# Patient Record
Sex: FEMALE | Race: BLACK OR AFRICAN AMERICAN | Employment: OTHER | URBAN - METROPOLITAN AREA
[De-identification: names, ages, dates, MRNs, and addresses within clinical notes are randomized per-mention and may not be internally consistent; named-entity substitution may affect disease eponyms.]

---

## 2017-04-03 ENCOUNTER — HOSPITAL ENCOUNTER (EMERGENCY)
Age: 52
Discharge: HOME | End: 2017-04-03
Attending: EMERGENCY MEDICINE
Payer: MEDICARE

## 2017-04-03 ENCOUNTER — APPOINTMENT (OUTPATIENT)
Dept: GENERAL RADIOLOGY | Age: 52
End: 2017-04-03
Attending: EMERGENCY MEDICINE
Payer: MEDICARE

## 2017-04-03 VITALS
HEIGHT: 65 IN | SYSTOLIC BLOOD PRESSURE: 144 MMHG | DIASTOLIC BLOOD PRESSURE: 82 MMHG | OXYGEN SATURATION: 98 % | BODY MASS INDEX: 33.82 KG/M2 | WEIGHT: 203 LBS | RESPIRATION RATE: 16 BRPM | TEMPERATURE: 99 F | HEART RATE: 78 BPM

## 2017-04-03 DIAGNOSIS — R07.9 CHEST PAIN, UNSPECIFIED TYPE: Primary | ICD-10-CM

## 2017-04-03 DIAGNOSIS — R03.0 ELEVATED BLOOD PRESSURE READING: ICD-10-CM

## 2017-04-03 PROCEDURE — 96372 THER/PROPH/DIAG INJ SC/IM: CPT

## 2017-04-03 PROCEDURE — 71020 XR CHEST PA LAT: CPT

## 2017-04-03 PROCEDURE — 93005 ELECTROCARDIOGRAM TRACING: CPT

## 2017-04-03 PROCEDURE — 74011250636 HC RX REV CODE- 250/636: Performed by: EMERGENCY MEDICINE

## 2017-04-03 PROCEDURE — 99284 EMERGENCY DEPT VISIT MOD MDM: CPT

## 2017-04-03 RX ORDER — NAPROXEN 500 MG/1
500 TABLET ORAL 2 TIMES DAILY WITH MEALS
Qty: 20 TAB | Refills: 0 | Status: SHIPPED | OUTPATIENT
Start: 2017-04-03 | End: 2017-04-13

## 2017-04-03 RX ORDER — OXYCODONE AND ACETAMINOPHEN 5; 325 MG/1; MG/1
1 TABLET ORAL
Qty: 12 TAB | Refills: 0 | Status: SHIPPED | OUTPATIENT
Start: 2017-04-03 | End: 2018-03-12

## 2017-04-03 RX ORDER — GABAPENTIN 300 MG/1
300 CAPSULE ORAL 2 TIMES DAILY
COMMUNITY
End: 2018-04-03

## 2017-04-03 RX ORDER — KETOROLAC TROMETHAMINE 30 MG/ML
30 INJECTION, SOLUTION INTRAMUSCULAR; INTRAVENOUS
Status: COMPLETED | OUTPATIENT
Start: 2017-04-03 | End: 2017-04-03

## 2017-04-03 RX ADMIN — KETOROLAC TROMETHAMINE 30 MG: 30 INJECTION, SOLUTION INTRAMUSCULAR at 20:23

## 2017-04-03 NOTE — ED TRIAGE NOTES
Twisted and reaching from front seat to back seat yesterday, pain started after twisting, pain is worse with movement and deep breathing, no other injuries reported, did not take any OTC medication,

## 2017-04-03 NOTE — ED NOTES
Amb into ed - reports she reached around from passenger front seat to back seat yesterday in car and ? Pulled a muscle in her r ribcage area - since that time has had pain in r anterior ribcage - worse when she lays down and w/ deep breathing.

## 2017-04-03 NOTE — ED NOTES
Sepsis Screening completed    (  )Patient meets SIRS criteria. (x  )Patient does not meet SIRS criteria.       SIRS Criteria is achieved when two or more of the following are present   Temperature < 96.8°F (36°C) or > 100.9°F (38.3°C)   Heart Rate > 90 beats per minute   Respiratory Rate > 20 breaths per minute   WBC count > 12,000 or <4,000 or > 10% bands

## 2017-04-04 NOTE — ED PROVIDER NOTES
[address]  EMERGENCY DEPARTMENT HISTORY AND PHYSICAL EXAM       Date: 4/3/2017   Patient Name: Felisa Estrada   YOB: 1965  Medical Record Number: [id number]    History of Presenting Illness     Chief Complaint   Patient presents with    Rib Pain        History Provided By:  patient    Additional History:   8:05 PM   Felisa Estrada is a 46 y.o. female who presents to the emergency department c/o right rib pan s/p turning in a car yesterday and \"felt a catch. \" Pain is exacerbated by movements and breathing. Associated symptoms include SOB, chills, and headache. She states she felt a \"catch. \" with twisting to back seat to feed baby a bottle. Pt reports she has had relief with Percocet in the past. PMHx of arthritis. Pt denies leg swelling, numbness/weakness, recent long travel, estrogen use, hemoptysis, cancer, hx of DVT/PE, hx of this in the past, and any other symptoms or complaints. No fam hx of DVT/PE/CAD. No exertional symptoms. Increase with deep breath. Better with rest.  Mild    Primary Care Provider: John Peugero MD   Specialist:    Past History     Past Medical History:   Past Medical History:   Diagnosis Date    Arthritis     GERD (gastroesophageal reflux disease)     Hepatitis C     Hypertension     SVT (supraventricular tachycardia) ([address]     WPW (Nahomy-Parkinson-White syndrome)         Past Surgical History:   Past Surgical History:   Procedure Laterality Date    HX KNEE ARTHROSCOPY Right     HX OTHER SURGICAL  1997    Hx knee surgery R knee    HX SVT ABLATION          Family History:   History reviewed. No pertinent family history.      Social History:   Social History   Substance Use Topics    Smoking status: Current Some Day Smoker     Packs/day: 0.25     Years: 17.00     Types: Cigarettes    Smokeless tobacco: Never Used    Alcohol use 3.6 oz/week     6 Cans of beer, 0 Standard drinks or equivalent per week      Comment: socially        Allergies: No Known Allergies     Review of Systems   Review of Systems   Constitutional: Positive for chills. Respiratory: Positive for shortness of breath. Cardiovascular: Negative for leg swelling. Musculoskeletal: Positive for arthralgias (right rib). Neurological: Positive for headaches. Negative for weakness and numbness. All other systems reviewed and are negative. Physical Exam  Vitals:    04/03/17 1934 04/03/17 2110   BP: (!) 145/91 144/82   Pulse: 90 78   Resp: 18 16   Temp: 99 °F (37.2 °C)    SpO2: 97% 98%   Weight: 92.1 kg (203 lb)    Height: 5' 5\" (1.651 m)        Physical Exam   Nursing note and vitals reviewed. Vital signs and nursing notes reviewed    CONSTITUTIONAL: Alert, in no apparent distress; well-developed; well-nourished. HEAD:  Normocephalic, atraumatic  EYES: PERRL; EOM's intact. ENTM: Nose: no rhinorrhea; Throat: no erythema or exudate, mucous membranes moist  Neck:  No JVD, supple without lymphadenopathy  RESP: Chest clear, equal breath sounds. Right chest wall TTP. No rash. No crepitance  CV: S1 and S2 WNL; No murmurs, gallops or rubs. GI: Normal bowel sounds, abdomen soft and non-tender. No masses or organomegaly. : No costo-vertebral angle tenderness. BACK:  Non-tender  UPPER EXT:  Normal inspection  LOWER EXT: No edema, no calf tenderness. Distal pulses intact. NEURO: CN intact, reflexes 2/4 and sym, strength 5/5 and sym, sensation intact. SKIN: No rashes; Normal for age and stage. PSYCH:  Alert and oriented, normal affect.      Diagnostic Study Results     Labs -      Recent Results (from the past 12 hour(s))   EKG, 12 LEAD, INITIAL    Collection Time: 04/03/17  8:20 PM   Result Value Ref Range    Ventricular Rate 79 BPM    Atrial Rate 79 BPM    P-R Interval 144 ms    QRS Duration 90 ms    Q-T Interval 404 ms    QTC Calculation (Bezet) 463 ms    Calculated P Axis 63 degrees    Calculated R Axis 43 degrees    Calculated T Axis 50 degrees    Diagnosis Normal sinus rhythm  Possible Left atrial enlargement  Borderline ECG  No previous ECGs available         Radiologic Studies -    8:13 PM  RADIOLOGY FINDINGS  Chest X-ray shows no acute process  Pending review by Radiologist     XR CHEST PA LAT    (Results Pending)        Medical Decision Making   I am the first provider for this patient. I reviewed the vital signs, available nursing notes, past medical history, past surgical history, family history and social history. Vital Signs-Reviewed the patient's vital signs. Patient Vitals for the past 12 hrs:   Temp Pulse Resp BP SpO2   04/03/17 2110 - 78 16 144/82 98 %   04/03/17 1934 99 °F (37.2 °C) 90 18 (!) 145/91 97 %       Pulse Oximetry Analysis - Normal 97% on RA    EKG interpretation: (Preliminary)  20:20   NSR, 79 bpm, WA interval 144 ms, QRS duration 90 ms, QT/QTc 463 ms, no STEMI  EKG read by Duane Glen, MD     Old Medical Records: Nursing notes. Provider Notes:   INITIAL CLINICAL IMPRESSION and PLANS:  The patient presents with the primary complaint(s) of: rib pain. The presentation, to include historical aspects and clinical findings are consistent with the DX of chest wall pain vs PTX vs PE. Considering the above, my initial management plan to evaluate and therapeutic interventions include the following and as noted in the orders:    1. Labs: N/A  2.  Imaging: CXR, EKG     PERC negative except age, low risk Wells score 0. No disress. Well appearing. Negative CXR. Will give Toradol and Percocet for home. Medications Given in the ED:  Medications   ketorolac tromethamine (TORADOL) 60 mg/2 mL injection 30 mg (30 mg IntraMUSCular Given 4/3/17 2023)        ED Course    8:05 PM  Initial assessment performed. Discharge Note:  8:26 PM   Pt has been reexamined. Patient has no new complaints, changes, or physical findings. Care plan outlined and precautions discussed. Results were reviewed with the patient.  All medications were reviewed with the patient; will d/c home with Naprosyn and Percocet. All of pt's questions and concerns were addressed. Patient was instructed and agrees to follow up with PCP, as well as to return to the ED upon further deterioration. Patient is ready to go home. Neg ECG. Neg CXR. Very low risk for ACS. Most likley MS    Diagnosis   Clinical Impression:   1. Chest pain, unspecified type    2. Elevated blood pressure reading           Follow-up Information     Follow up With Details Comments Contact Info    Olivia Menchaca MD Schedule an appointment as soon as possible for a visit Follow up with your primary care physician or the one listed [id number] [facility] [address]      [facility] EMERGENCY [facility]  As needed, If symptoms worsen [address]  [phone]          Discharge Medication List as of 4/3/2017  8:35 PM      START taking these medications    Details   oxyCODONE-acetaminophen (PERCOCET) 5-325 mg per tablet Take 1 Tab by mouth every four (4) hours as needed for Pain for up to 12 doses. Max Daily Amount: 6 Tabs., Print, Disp-12 Tab, R-0      naproxen (NAPROSYN) 500 mg tablet Take 1 Tab by mouth two (2) times daily (with meals) for 10 days. , Print, Disp-20 Tab, R-0         CONTINUE these medications which have NOT CHANGED    Details   gabapentin (NEURONTIN) 300 mg capsule Take 300 mg by mouth two (2) times a day. One tab in morning and two tabs at night, Historical Med      lisinopril (PRINIVIL, ZESTRIL) 20 mg tablet Take  by mouth daily. , Historical Med      fluticasone (FLONASE) 50 mcg/actuation nasal spray 2 Sprays by Both Nostrils route once., Historical Med      Omeprazole delayed release (PRILOSEC D/R) 20 mg tablet Take 20 mg by mouth daily. , Historical Med      verapamil SR (CALAN-SR) 180 mg CR tablet Take 180 mg by mouth nightly., Historical Med      hydrochlorothiazide (HYDRODIURIL) 25 mg tablet Take 25 mg by mouth daily. , Historical Med _______________________________   Attestations: This note is prepared by Kristen Willoughby, acting as a Scribe for Shen Palacio MD at 8:05 PM on 04/03/2017     Shen Palacio MD : The scribe's documentation has been prepared under my direction and personally reviewed by me in its entirety.   _______________________________

## 2017-04-04 NOTE — DISCHARGE INSTRUCTIONS
Musculoskeletal Chest Pain: Care Instructions  Your Care Instructions  Chest pain is not always a sign that something is wrong with your heart or that you have another serious problem. The doctor thinks your chest pain is caused by strained muscles or ligaments, inflamed chest cartilage, or another problem in your chest, rather than by your heart. You may need more tests to find the cause of your chest pain. Follow-up care is a key part of your treatment and safety. Be sure to make and go to all appointments, and call your doctor if you are having problems. Its also a good idea to know your test results and keep a list of the medicines you take. How can you care for yourself at home? · Take pain medicines exactly as directed. ¨ If the doctor gave you a prescription medicine for pain, take it as prescribed. ¨ If you are not taking a prescription pain medicine, ask your doctor if you can take an over-the-counter medicine. · Rest and protect the sore area. · Stop, change, or take a break from any activity that may be causing your pain or soreness. · Put ice or a cold pack on the sore area for 10 to 20 minutes at a time. Try to do this every 1 to 2 hours for the next 3 days (when you are awake) or until the swelling goes down. Put a thin cloth between the ice and your skin. · After 2 or 3 days, apply a heating pad set on low or a warm cloth to the area that hurts. Some doctors suggest that you go back and forth between hot and cold. · Do not wrap or tape your ribs for support. This may cause you to take smaller breaths, which could increase your risk of lung problems. · Mentholated creams such as Bengay or Icy Hot may soothe sore muscles. Follow the instructions on the package. · Follow your doctor's instructions for exercising. · Gentle stretching and massage may help you get better faster. Stretch slowly to the point just before pain begins, and hold the stretch for at least 15 to 30 seconds. Do this 3 or 4 times a day. Stretch just after you have applied heat. · As your pain gets better, slowly return to your normal activities. Any increased pain may be a sign that you need to rest a while longer. When should you call for help? Call 911 anytime you think you may need emergency care. For example, call if:  · You have chest pain or pressure. This may occur with:  ¨ Sweating. ¨ Shortness of breath. ¨ Nausea or vomiting. ¨ Pain that spreads from the chest to the neck, jaw, or one or both shoulders or arms. ¨ Dizziness or lightheadedness. ¨ A fast or uneven pulse. After calling 911, chew 1 adult-strength aspirin. Wait for an ambulance. Do not try to drive yourself. · You have sudden chest pain and shortness of breath, or you cough up blood. Call your doctor now or seek immediate medical care if:  · You have any trouble breathing. · Your chest pain gets worse. · Your chest pain occurs consistently with exercise and is relieved by rest.  Watch closely for changes in your health, and be sure to contact your doctor if:  · Your chest pain does not get better after 1 week. Where can you learn more? Go to http://selena-lamonte.info/. Enter V293 in the search box to learn more about \"Musculoskeletal Chest Pain: Care Instructions. \"  Current as of: May 27, 2016  Content Version: 11.2  © 4807-2713 [employer]. Care instructions adapted under license by [employer] (which disclaims liability or warranty for this information). If you have questions about a medical condition or this instruction, always ask your healthcare professional. [place] [address] any warranty or liability for your use of this information. Elevated Blood Pressure: Care Instructions  Your Care Instructions    Blood pressure is a measure of how hard the blood pushes against the walls of your arteries. It's normal for blood pressure to go up and down throughout the day.  But if it stays up over time, you have high blood pressure. Two numbers tell you your blood pressure. The first number is the systolic pressure. It shows how hard the blood pushes when your heart is pumping. The second number is the diastolic pressure. It shows how hard the blood pushes between heartbeats, when your heart is relaxed and filling with blood. An ideal blood pressure in adults is less than 120/80 (say \"120 over 80\"). High blood pressure is 140/90 or higher. You have high blood pressure if your top number is 140 or higher or your bottom number is 90 or higher, or both. The main test for high blood pressure is simple, fast, and painless. To diagnose high blood pressure, your doctor will test your blood pressure at different times. After testing your blood pressure, your doctor may ask you to test it again when you are home. If you are diagnosed with high blood pressure, you can work with your doctor to make a long-term plan to manage it. Follow-up care is a key part of your treatment and safety. Be sure to make and go to all appointments, and call your doctor if you are having problems. It's also a good idea to know your test results and keep a list of the medicines you take. How can you care for yourself at home? · Do not smoke. Smoking increases your risk for heart attack and stroke. If you need help quitting, talk to your doctor about stop-smoking programs and medicines. These can increase your chances of quitting for good. · Stay at a healthy weight. · Try to limit how much sodium you eat to less than 2,300 milligrams (mg) a day. Your doctor may ask you to try to eat less than 1,500 mg a day. · Be physically active. Get at least 30 minutes of exercise on most days of the week. Walking is a good choice. You also may want to do other activities, such as running, swimming, cycling, or playing tennis or team sports. · Avoid or limit alcohol.  Talk to your doctor about whether you can drink any alcohol. · Eat plenty of fruits, vegetables, and low-fat dairy products. Eat less saturated and total fats. · Learn how to check your blood pressure at home. When should you call for help? Call your doctor now or seek immediate medical care if:  · Your blood pressure is much higher than normal (such as 180/110 or higher). · You think high blood pressure is causing symptoms such as:  ¨ Severe headache. ¨ Blurry vision. Watch closely for changes in your health, and be sure to contact your doctor if:  · You do not get better as expected. Where can you learn more? Go to http://selena-lamonte.info/. Enter T047 in the search box to learn more about \"Elevated Blood Pressure: Care Instructions. \"  Current as of: October 19, 2016  Content Version: 11.2  © 6524-1587 [employer]. Care instructions adapted under license by [employer] (which disclaims liability or warranty for this information). If you have questions about a medical condition or this instruction, always ask your healthcare professional. [place] [address] any warranty or liability for your use of this information.

## 2017-04-07 LAB
ATRIAL RATE: 79 BPM
CALCULATED P AXIS, ECG09: 63 DEGREES
CALCULATED R AXIS, ECG10: 43 DEGREES
CALCULATED T AXIS, ECG11: 50 DEGREES
DIAGNOSIS, 93000: NORMAL
P-R INTERVAL, ECG05: 144 MS
Q-T INTERVAL, ECG07: 404 MS
QRS DURATION, ECG06: 90 MS
QTC CALCULATION (BEZET), ECG08: 463 MS
VENTRICULAR RATE, ECG03: 79 BPM

## 2018-03-12 ENCOUNTER — APPOINTMENT (OUTPATIENT)
Dept: CT IMAGING | Age: 53
End: 2018-03-12
Attending: INTERNAL MEDICINE
Payer: MEDICARE

## 2018-03-12 ENCOUNTER — HOSPITAL ENCOUNTER (EMERGENCY)
Age: 53
Discharge: HOME | End: 2018-03-12
Attending: INTERNAL MEDICINE | Admitting: INTERNAL MEDICINE
Payer: MEDICARE

## 2018-03-12 VITALS
SYSTOLIC BLOOD PRESSURE: 143 MMHG | TEMPERATURE: 97.5 F | OXYGEN SATURATION: 100 % | HEIGHT: 65 IN | BODY MASS INDEX: 32.82 KG/M2 | HEART RATE: 65 BPM | RESPIRATION RATE: 16 BRPM | WEIGHT: 197 LBS | DIASTOLIC BLOOD PRESSURE: 78 MMHG

## 2018-03-12 DIAGNOSIS — K57.32 SIGMOID DIVERTICULITIS: Primary | ICD-10-CM

## 2018-03-12 DIAGNOSIS — E87.6 HYPOKALEMIA: ICD-10-CM

## 2018-03-12 DIAGNOSIS — K29.90 GASTRITIS AND DUODENITIS: ICD-10-CM

## 2018-03-12 LAB
ALBUMIN SERPL-MCNC: 3.1 G/DL (ref 3.4–5)
ALBUMIN/GLOB SERPL: 0.6 {RATIO} (ref 0.8–1.7)
ALP SERPL-CCNC: 99 U/L (ref 45–117)
ALT SERPL-CCNC: 33 U/L (ref 13–56)
ANION GAP SERPL CALC-SCNC: 8 MMOL/L (ref 3–18)
APPEARANCE UR: CLEAR
AST SERPL-CCNC: 37 U/L (ref 15–37)
BACTERIA URNS QL MICRO: ABNORMAL /HPF
BASOPHILS # BLD: 0 K/UL (ref 0–0.06)
BASOPHILS NFR BLD: 0 % (ref 0–2)
BILIRUB SERPL-MCNC: 0.3 MG/DL (ref 0.2–1)
BILIRUB UR QL: NEGATIVE
BUN SERPL-MCNC: 16 MG/DL (ref 7–18)
BUN/CREAT SERPL: 18 (ref 12–20)
CALCIUM SERPL-MCNC: 8.7 MG/DL (ref 8.5–10.1)
CHLORIDE SERPL-SCNC: 105 MMOL/L (ref 100–108)
CO2 SERPL-SCNC: 29 MMOL/L (ref 21–32)
COLOR UR: YELLOW
CREAT SERPL-MCNC: 0.88 MG/DL (ref 0.6–1.3)
DIFFERENTIAL METHOD BLD: ABNORMAL
EOSINOPHIL # BLD: 0.3 K/UL (ref 0–0.4)
EOSINOPHIL NFR BLD: 4 % (ref 0–5)
EPITH CASTS URNS QL MICRO: ABNORMAL /LPF (ref 0–5)
ERYTHROCYTE [DISTWIDTH] IN BLOOD BY AUTOMATED COUNT: 13.4 % (ref 11.6–14.5)
GLOBULIN SER CALC-MCNC: 5.5 G/DL (ref 2–4)
GLUCOSE SERPL-MCNC: 91 MG/DL (ref 74–99)
GLUCOSE UR STRIP.AUTO-MCNC: NEGATIVE MG/DL
HCT VFR BLD AUTO: 42.5 % (ref 35–45)
HGB BLD-MCNC: 14.4 G/DL (ref 12–16)
HGB UR QL STRIP: NEGATIVE
HYALINE CASTS URNS QL MICRO: ABNORMAL /LPF (ref 0–2)
KETONES UR QL STRIP.AUTO: ABNORMAL MG/DL
LEUKOCYTE ESTERASE UR QL STRIP.AUTO: ABNORMAL
LIPASE SERPL-CCNC: 156 U/L (ref 73–393)
LYMPHOCYTES # BLD: 2.5 K/UL (ref 0.9–3.6)
LYMPHOCYTES NFR BLD: 28 % (ref 21–52)
MCH RBC QN AUTO: 29 PG (ref 24–34)
MCHC RBC AUTO-ENTMCNC: 33.9 G/DL (ref 31–37)
MCV RBC AUTO: 85.5 FL (ref 74–97)
MONOCYTES # BLD: 0.8 K/UL (ref 0.05–1.2)
MONOCYTES NFR BLD: 9 % (ref 3–10)
NEUTS SEG # BLD: 5.3 K/UL (ref 1.8–8)
NEUTS SEG NFR BLD: 59 % (ref 40–73)
NITRITE UR QL STRIP.AUTO: NEGATIVE
PH UR STRIP: 5.5 [PH] (ref 5–8)
PLATELET # BLD AUTO: 109 K/UL (ref 135–420)
PMV BLD AUTO: 10.3 FL (ref 9.2–11.8)
POTASSIUM SERPL-SCNC: 3.3 MMOL/L (ref 3.5–5.5)
PROT SERPL-MCNC: 8.6 G/DL (ref 6.4–8.2)
PROT UR STRIP-MCNC: NEGATIVE MG/DL
RBC # BLD AUTO: 4.97 M/UL (ref 4.2–5.3)
RBC #/AREA URNS HPF: ABNORMAL /HPF (ref 0–5)
SODIUM SERPL-SCNC: 142 MMOL/L (ref 136–145)
SP GR UR REFRACTOMETRY: 1.02 (ref 1–1.03)
UROBILINOGEN UR QL STRIP.AUTO: 1 EU/DL (ref 0.2–1)
WBC # BLD AUTO: 9.1 K/UL (ref 4.6–13.2)
WBC URNS QL MICRO: ABNORMAL /HPF (ref 0–5)

## 2018-03-12 PROCEDURE — 74011250637 HC RX REV CODE- 250/637: Performed by: INTERNAL MEDICINE

## 2018-03-12 PROCEDURE — 83690 ASSAY OF LIPASE: CPT | Performed by: NURSE PRACTITIONER

## 2018-03-12 PROCEDURE — 74177 CT ABD & PELVIS W/CONTRAST: CPT

## 2018-03-12 PROCEDURE — 99284 EMERGENCY DEPT VISIT MOD MDM: CPT

## 2018-03-12 PROCEDURE — 80053 COMPREHEN METABOLIC PANEL: CPT | Performed by: NURSE PRACTITIONER

## 2018-03-12 PROCEDURE — 85025 COMPLETE CBC W/AUTO DIFF WBC: CPT | Performed by: NURSE PRACTITIONER

## 2018-03-12 PROCEDURE — 96375 TX/PRO/DX INJ NEW DRUG ADDON: CPT

## 2018-03-12 PROCEDURE — 74011250636 HC RX REV CODE- 250/636: Performed by: INTERNAL MEDICINE

## 2018-03-12 PROCEDURE — 81001 URINALYSIS AUTO W/SCOPE: CPT | Performed by: INTERNAL MEDICINE

## 2018-03-12 PROCEDURE — 96372 THER/PROPH/DIAG INJ SC/IM: CPT

## 2018-03-12 PROCEDURE — 74011636320 HC RX REV CODE- 636/320: Performed by: INTERNAL MEDICINE

## 2018-03-12 PROCEDURE — 96365 THER/PROPH/DIAG IV INF INIT: CPT

## 2018-03-12 RX ORDER — DICYCLOMINE HYDROCHLORIDE 10 MG/ML
20 INJECTION INTRAMUSCULAR
Status: COMPLETED | OUTPATIENT
Start: 2018-03-12 | End: 2018-03-12

## 2018-03-12 RX ORDER — ONDANSETRON 4 MG/1
4 TABLET, ORALLY DISINTEGRATING ORAL
Status: COMPLETED | OUTPATIENT
Start: 2018-03-12 | End: 2018-03-12

## 2018-03-12 RX ORDER — CIPROFLOXACIN 500 MG/1
500 TABLET ORAL
Status: COMPLETED | OUTPATIENT
Start: 2018-03-12 | End: 2018-03-12

## 2018-03-12 RX ORDER — METRONIDAZOLE 500 MG/1
500 TABLET ORAL 2 TIMES DAILY
Qty: 20 TAB | Refills: 0 | Status: SHIPPED | OUTPATIENT
Start: 2018-03-12 | End: 2018-03-22

## 2018-03-12 RX ORDER — DICYCLOMINE HYDROCHLORIDE 10 MG/ML
20 INJECTION INTRAMUSCULAR
Status: DISCONTINUED | OUTPATIENT
Start: 2018-03-12 | End: 2018-03-12

## 2018-03-12 RX ORDER — METRONIDAZOLE 250 MG/1
500 TABLET ORAL
Status: COMPLETED | OUTPATIENT
Start: 2018-03-12 | End: 2018-03-12

## 2018-03-12 RX ORDER — CIPROFLOXACIN 500 MG/1
500 TABLET ORAL 2 TIMES DAILY
Qty: 14 TAB | Refills: 0 | Status: SHIPPED | OUTPATIENT
Start: 2018-03-12 | End: 2018-03-19

## 2018-03-12 RX ORDER — ACETAMINOPHEN AND CODEINE PHOSPHATE 300; 30 MG/1; MG/1
1 TABLET ORAL
Qty: 12 TAB | Refills: 0 | Status: SHIPPED | OUTPATIENT
Start: 2018-03-12 | End: 2018-04-03

## 2018-03-12 RX ORDER — POTASSIUM CHLORIDE 1.5 G/1.77G
40 POWDER, FOR SOLUTION ORAL
Status: COMPLETED | OUTPATIENT
Start: 2018-03-12 | End: 2018-03-12

## 2018-03-12 RX ORDER — MORPHINE SULFATE 2 MG/ML
2 INJECTION, SOLUTION INTRAMUSCULAR; INTRAVENOUS
Status: COMPLETED | OUTPATIENT
Start: 2018-03-12 | End: 2018-03-12

## 2018-03-12 RX ORDER — FAMOTIDINE 20 MG/1
20 TABLET, FILM COATED ORAL 2 TIMES DAILY
Qty: 20 TAB | Refills: 0 | Status: SHIPPED | OUTPATIENT
Start: 2018-03-12 | End: 2018-03-22

## 2018-03-12 RX ORDER — ACETAMINOPHEN 10 MG/ML
1000 INJECTION, SOLUTION INTRAVENOUS ONCE
Status: COMPLETED | OUTPATIENT
Start: 2018-03-12 | End: 2018-03-12

## 2018-03-12 RX ADMIN — IOPAMIDOL 100 ML: 612 INJECTION, SOLUTION INTRAVENOUS at 21:43

## 2018-03-12 RX ADMIN — ONDANSETRON 4 MG: 4 TABLET, ORALLY DISINTEGRATING ORAL at 22:51

## 2018-03-12 RX ADMIN — CIPROFLOXACIN HYDROCHLORIDE 500 MG: 500 TABLET, FILM COATED ORAL at 22:51

## 2018-03-12 RX ADMIN — ACETAMINOPHEN 1000 MG: 10 INJECTION, SOLUTION INTRAVENOUS at 20:58

## 2018-03-12 RX ADMIN — DICYCLOMINE HYDROCHLORIDE 20 MG: 20 INJECTION, SOLUTION INTRAMUSCULAR at 20:58

## 2018-03-12 RX ADMIN — MORPHINE SULFATE 2 MG: 2 INJECTION, SOLUTION INTRAMUSCULAR; INTRAVENOUS at 22:51

## 2018-03-12 RX ADMIN — POTASSIUM CHLORIDE 40 MEQ: 1.5 POWDER, FOR SOLUTION ORAL at 22:51

## 2018-03-12 RX ADMIN — METRONIDAZOLE 500 MG: 250 TABLET ORAL at 22:51

## 2018-03-12 NOTE — ED TRIAGE NOTES
Dafne Fowler is a 46 y.o. female presents to ED c/o 7/10 constant RLQ abdominal pain occasionally radiating to entire abdomen onset 5 days ago. Pain is worse after urinating. Associated symptoms include N/V/D (onset 6 days ago, lasted for 2 days, then resolved). Pmhx of HTN and arthritis. Pt denies fever, hematochezia, melena, hx abdominal surgeries, and any other Sx or complaints. Written by Dany Lundborg, ED Scribe, with Bryan Reilly P-BC, ED Provider.

## 2018-03-12 NOTE — ED TRIAGE NOTES
C/o lower abd pain since yesterday, states she had stomach virus last week with nausea, vomiting and diarrhea. States she has urinary frequency. Sepsis Screening completed    (  )Patient meets SIRS criteria. ( x )Patient does not meet SIRS criteria.       SIRS Criteria is achieved when two or more of the following are present   Temperature < 96.8°F (36°C) or > 100.9°F (38.3°C)   Heart Rate > 90 beats per minute   Respiratory Rate > 20 breaths per minute   WBC count > 12,000 or <4,000 or > 10% bands

## 2018-03-13 NOTE — ED PROVIDER NOTES
EMERGENCY DEPARTMENT HISTORY AND PHYSICAL EXAM    Date: 3/12/2018  Patient Name: Hina Morrissey    History of Presenting Illness     Chief Complaint   Patient presents with    Abdominal Pain         History Provided By: Patient    Chief Complaint: Abd pain  Duration: 5 days  Timing:  Constant  Location: lower  Severity: 8 out of 10  Modifying Factors: None  Associated Symptoms: denies any other associated signs or symptoms    Additional History (Context):   8:29 PM  Hina Morrissey is a 46 y.o. female with PMHX HTN, hepatitis, GERD, WPW, and SVT presents to the emergency department C/O constant lower abdominal pain, rated 8/10, onset yesterday. Pain is worse after urinating. No other associated sxs. Has not tried anything for her pain. Pt states she had a few episodes of n/v/d 6 days ago, which resolved after 2 days. Tried Pepto-Bismol with no relief (last dose 6 days ago). Pt states she was at her baseline since the n/v/d until yesterday when her abd pain began. LBM 4 days ago. Pt denies hx abdominal surgeries, fever, chills, hematochezia, melena, difficulty urinating, CP, SOB, swelling, lumps, rashes, vaginal bleeding or discharge, and any other Sx or complaints. PCP: John Peguero MD    Current Outpatient Prescriptions   Medication Sig Dispense Refill    ciprofloxacin HCl (CIPRO) 500 mg tablet Take 1 Tab by mouth two (2) times a day for 7 days. 14 Tab 0    metroNIDAZOLE (FLAGYL) 500 mg tablet Take 1 Tab by mouth two (2) times a day for 10 days. 20 Tab 0    acetaminophen-codeine (TYLENOL-CODEINE #3) 300-30 mg per tablet Take 1 Tab by mouth every six (6) hours as needed for Pain (for severe pain only). Max Daily Amount: 4 Tabs. 12 Tab 0    famotidine (PEPCID) 20 mg tablet Take 1 Tab by mouth two (2) times a day for 10 days. 20 Tab 0    gabapentin (NEURONTIN) 300 mg capsule Take 300 mg by mouth two (2) times a day.  One tab in morning and two tabs at night      lisinopril (PRINIVIL, ZESTRIL) 20 mg tablet Take by mouth daily.  fluticasone (FLONASE) 50 mcg/actuation nasal spray 2 Sprays by Both Nostrils route once.  verapamil SR (CALAN-SR) 180 mg CR tablet Take 180 mg by mouth nightly.  hydrochlorothiazide (HYDRODIURIL) 25 mg tablet Take 25 mg by mouth daily. Past History     Past Medical History:  Past Medical History:   Diagnosis Date    Arthritis     GERD (gastroesophageal reflux disease)     Hepatitis C     Hypertension     SVT (supraventricular tachycardia) (HCC)     WPW (Nahomy-Parkinson-White syndrome)        Past Surgical History:  Past Surgical History:   Procedure Laterality Date    HX KNEE ARTHROSCOPY Right     HX OTHER SURGICAL  1997    Hx knee surgery R knee    HX SVT ABLATION         Family History:  No family history on file. Social History:  Social History   Substance Use Topics    Smoking status: Current Some Day Smoker     Packs/day: 0.25     Years: 17.00     Types: Cigarettes    Smokeless tobacco: Never Used    Alcohol use 3.6 oz/week     6 Cans of beer, 0 Standard drinks or equivalent per week      Comment: socially       Allergies:  No Known Allergies      Review of Systems   Review of Systems   Constitutional: Negative for chills and fever. Respiratory: Negative for shortness of breath. Cardiovascular: Negative for chest pain. Gastrointestinal: Positive for constipation, diarrhea (resolved), nausea (resolved) and vomiting (resolved). Negative for blood in stool. Genitourinary: Negative for difficulty urinating, vaginal bleeding and vaginal discharge. Skin: Negative for rash. All other systems reviewed and are negative. Physical Exam     Vitals:    03/12/18 1732 03/12/18 2130   BP: 122/62 143/78   Pulse: 65    Resp: 16    Temp: 97.5 °F (36.4 °C)    SpO2: 97% 100%   Weight: 89.4 kg (197 lb)    Height: 5' 5\" (1.651 m)      Physical Exam   Constitutional: She is oriented to person, place, and time. She appears well-developed and well-nourished. HENT:   Head: Normocephalic and atraumatic. Right Ear: External ear normal.   Left Ear: External ear normal.   Nose: Rhinorrhea present. No mucosal edema. Mouth/Throat: Oropharynx is clear and moist. Mucous membranes are dry. Nose: erythema   Eyes: Conjunctivae and EOM are normal. Pupils are equal, round, and reactive to light. Right eye exhibits no discharge. Left eye exhibits no discharge. No scleral icterus. Neck: Normal range of motion. Neck supple. No JVD present. No tracheal deviation present. Cardiovascular: Normal rate, regular rhythm, normal heart sounds and intact distal pulses. Exam reveals no gallop and no friction rub. No murmur heard. Pulses:       Dorsalis pedis pulses are 2+ on the right side   Pulmonary/Chest: Effort normal and breath sounds normal.   Abdominal: Soft. She exhibits no distension. Bowel sounds are increased. There is tenderness in the right lower quadrant, suprapubic area and left lower quadrant. There is no rebound and no guarding. No HSM   Musculoskeletal: Normal range of motion. She exhibits no edema. Neurological: She is alert and oriented to person, place, and time. No cranial nerve deficit. She exhibits normal muscle tone. Coordination normal.   No focal motor weakness. Skin: Skin is warm and dry. No rash noted. Psychiatric: She has a normal mood and affect. Her behavior is normal.   Nursing note and vitals reviewed.         Diagnostic Study Results     Labs -     Recent Results (from the past 12 hour(s))   URINALYSIS W/ RFLX MICROSCOPIC    Collection Time: 03/12/18  5:34 PM   Result Value Ref Range    Color YELLOW      Appearance CLEAR      Specific gravity 1.020 1.005 - 1.030      pH (UA) 5.5 5.0 - 8.0      Protein NEGATIVE  NEG mg/dL    Glucose NEGATIVE  NEG mg/dL    Ketone TRACE (A) NEG mg/dL    Bilirubin NEGATIVE  NEG      Blood NEGATIVE  NEG      Urobilinogen 1.0 0.2 - 1.0 EU/dL    Nitrites NEGATIVE  NEG      Leukocyte Esterase TRACE (A) NEG URINE MICROSCOPIC ONLY    Collection Time: 03/12/18  5:34 PM   Result Value Ref Range    WBC 0 to 3 0 - 5 /hpf    RBC 0 to 3 0 - 5 /hpf    Epithelial cells FEW 0 - 5 /lpf    Bacteria FEW (A) NEG /hpf    Hyaline cast 0 to 3 0 - 2 /lpf   CBC WITH AUTOMATED DIFF    Collection Time: 03/12/18  7:40 PM   Result Value Ref Range    WBC 9.1 4.6 - 13.2 K/uL    RBC 4.97 4.20 - 5.30 M/uL    HGB 14.4 12.0 - 16.0 g/dL    HCT 42.5 35.0 - 45.0 %    MCV 85.5 74.0 - 97.0 FL    MCH 29.0 24.0 - 34.0 PG    MCHC 33.9 31.0 - 37.0 g/dL    RDW 13.4 11.6 - 14.5 %    PLATELET 980 (L) 911 - 420 K/uL    MPV 10.3 9.2 - 11.8 FL    NEUTROPHILS 59 40 - 73 %    LYMPHOCYTES 28 21 - 52 %    MONOCYTES 9 3 - 10 %    EOSINOPHILS 4 0 - 5 %    BASOPHILS 0 0 - 2 %    ABS. NEUTROPHILS 5.3 1.8 - 8.0 K/UL    ABS. LYMPHOCYTES 2.5 0.9 - 3.6 K/UL    ABS. MONOCYTES 0.8 0.05 - 1.2 K/UL    ABS. EOSINOPHILS 0.3 0.0 - 0.4 K/UL    ABS. BASOPHILS 0.0 0.0 - 0.06 K/UL    DF AUTOMATED     METABOLIC PANEL, COMPREHENSIVE    Collection Time: 03/12/18  7:40 PM   Result Value Ref Range    Sodium 142 136 - 145 mmol/L    Potassium 3.3 (L) 3.5 - 5.5 mmol/L    Chloride 105 100 - 108 mmol/L    CO2 29 21 - 32 mmol/L    Anion gap 8 3.0 - 18 mmol/L    Glucose 91 74 - 99 mg/dL    BUN 16 7.0 - 18 MG/DL    Creatinine 0.88 0.6 - 1.3 MG/DL    BUN/Creatinine ratio 18 12 - 20      GFR est AA >60 >60 ml/min/1.73m2    GFR est non-AA >60 >60 ml/min/1.73m2    Calcium 8.7 8.5 - 10.1 MG/DL    Bilirubin, total 0.3 0.2 - 1.0 MG/DL    ALT (SGPT) 33 13 - 56 U/L    AST (SGOT) 37 15 - 37 U/L    Alk.  phosphatase 99 45 - 117 U/L    Protein, total 8.6 (H) 6.4 - 8.2 g/dL    Albumin 3.1 (L) 3.4 - 5.0 g/dL    Globulin 5.5 (H) 2.0 - 4.0 g/dL    A-G Ratio 0.6 (L) 0.8 - 1.7     LIPASE    Collection Time: 03/12/18  7:40 PM   Result Value Ref Range    Lipase 156 73 - 393 U/L       Radiologic Studies -   CT ABD PELV W CONT   Final Result        CT Results  (Last 48 hours)               03/12/18 0186  CT ABD PELV W CONT Final result    Impression:  IMPRESSION:        Findings compatible with early sigmoid diverticulitis. Wall thickening of the stomach antrum suggesting nonspecific antral gastritis. Nonobstructing right renal calculus. Narrative:  EXAM: CT of the abdomen and pelvis       INDICATION: Abdominal pain. COMPARISON: None. TECHNIQUE: Axial CT imaging of the abdomen and pelvis was performed with   intravenous contrast. Multiplanar reformats were generated. One or more dose reduction techniques were used on this CT: automated exposure   control, adjustment of the mAs and/or kVp according to patient's size, and   iterative reconstruction techniques. The specific techniques utilized on this CT   exam have been documented in the patient's electronic medical record.       _______________       FINDINGS:       LOWER CHEST: Unremarkable. LIVER, BILIARY: Liver demonstrates mildly lobular/nodular contour, otherwise is   normal. No biliary dilation. Gallbladder is unremarkable. PANCREAS: Normal.       SPLEEN: Normal.       ADRENALS: Normal.       KIDNEYS/URETERS/BLADDER: No acute finding. Nonobstructing 3 mm calculus lower   pole right kidney. Pema Mad PELVIC ORGANS: Unremarkable. VASCULATURE: Unremarkable       LYMPH NODES: No enlarged lymph nodes. GASTROINTESTINAL TRACT: Extensive left colonic diverticulosis and focal   inflammation of the sigmoid colon compatible with acute diverticulitis. No   abscess or free air. Normal appendix. No bowel obstruction. Wall thickening of   the stomach antrum noted. Pema Mad BONES: No acute or aggressive osseous abnormalities identified.        OTHER: None.       _______________               CXR Results  (Last 48 hours)    None          MEDICATIONS GIVEN:  Medications   acetaminophen (OFIRMEV) infusion 1,000 mg (0 mg IntraVENous IV Completed 3/12/18 7815)   dicyclomine (BENTYL) 10 mg/mL injection 20 mg (20 mg IntraMUSCular Given 3/12/18 2058)   iopamidol (ISOVUE 300) 61 % contrast injection 100 mL (100 mL IntraVENous Given 3/12/18 2143)   potassium chloride (KLOR-CON) packet 40 mEq (40 mEq Oral Given 3/12/18 2251)   ciprofloxacin HCl (CIPRO) tablet 500 mg (500 mg Oral Given 3/12/18 2251)   metroNIDAZOLE (FLAGYL) tablet 500 mg (500 mg Oral Given 3/12/18 2251)   morphine injection 2 mg (2 mg IntraVENous Given 3/12/18 2251)   ondansetron (ZOFRAN ODT) tablet 4 mg (4 mg Oral Given 3/12/18 2251)        Medical Decision Making   I am the first provider for this patient. I reviewed the vital signs, available nursing notes, past medical history, past surgical history, family history and social history. Vital Signs-Reviewed the patient's vital signs. Pulse Oximetry Analysis - 97% on RA     Records Reviewed: Nursing Notes    Provider Notes (Medical Decision Making):   DDX: mass, appendicitis, obstruction, medication effect, colitis, diverticulitis, GYN pathology or stone/other  pathology    Procedures:  Procedures    ED Course:   8:29 PM Initial assessment performed. The patients presenting problems have been discussed, and they are in agreement with the care plan formulated and outlined with them. I have encouraged them to ask questions as they arise throughout their visit. 10:25 PM Pain is not much improved. Will order pain meds. Diagnosis and Disposition       DISCHARGE NOTE:  10:46 PM  Kip Ratliff's  results have been reviewed with her. She has been counseled regarding her diagnosis, treatment, and plan. She verbally conveys understanding and agreement of the signs, symptoms, diagnosis, treatment and prognosis and additionally agrees to follow up as discussed. She also agrees with the care-plan and conveys that all of her questions have been answered.   I have also provided discharge instructions for her that include: educational information regarding their diagnosis and treatment, and list of reasons why they would want to return to the ED prior to their follow-up appointment, should her condition change. She has been provided with education for proper emergency department utilization. CLINICAL IMPRESSION:    1. Sigmoid diverticulitis    2. Gastritis and duodenitis    3. Hypokalemia        PLAN:  1. D/C Home  2. Discharge Medication List as of 3/12/2018 10:29 PM      START taking these medications    Details   ciprofloxacin HCl (CIPRO) 500 mg tablet Take 1 Tab by mouth two (2) times a day for 7 days. , Normal, Disp-14 Tab, R-0      metroNIDAZOLE (FLAGYL) 500 mg tablet Take 1 Tab by mouth two (2) times a day for 10 days. , Normal, Disp-20 Tab, R-0      acetaminophen-codeine (TYLENOL-CODEINE #3) 300-30 mg per tablet Take 1 Tab by mouth every six (6) hours as needed for Pain (for severe pain only). Max Daily Amount: 4 Tabs., Print, Disp-12 Tab, R-0      famotidine (PEPCID) 20 mg tablet Take 1 Tab by mouth two (2) times a day for 10 days. , Normal, Disp-20 Tab, R-0         CONTINUE these medications which have NOT CHANGED    Details   gabapentin (NEURONTIN) 300 mg capsule Take 300 mg by mouth two (2) times a day. One tab in morning and two tabs at night, Historical Med      lisinopril (PRINIVIL, ZESTRIL) 20 mg tablet Take  by mouth daily. , Historical Med      fluticasone (FLONASE) 50 mcg/actuation nasal spray 2 Sprays by Both Nostrils route once., Historical Med      verapamil SR (CALAN-SR) 180 mg CR tablet Take 180 mg by mouth nightly., Historical Med      hydrochlorothiazide (HYDRODIURIL) 25 mg tablet Take 25 mg by mouth daily. , Historical Med           3.    Follow-up Information     Follow up With Details Comments Chandrakant Bashir MD Schedule an appointment as soon as possible for a visit in 2 days Gastroenterology follow up.  [address] [place] [address] Rd       Your PCP Schedule an appointment as soon as possible for a visit in 2 days      [facility] EMERGENCY DEPT  As needed, if symptoms worsen [address] [place] [facility] [id number]  [phone]          _______________________________   Attestations:     SCRIBE ATTESTATION:  This note is prepared by Kylah Ovalles, acting as Scribe for Anastasia Arteaga MD.    PROVIDER ATTESTATION:  Anastasia Arteaga MD: The scribe's documentation has been prepared under my direction and personally reviewed by me in its entirety.  I confirm that the note above accurately reflects all work, treatment, procedures, and medical decision making performed by me.   _______________________________

## 2018-04-03 ENCOUNTER — APPOINTMENT (OUTPATIENT)
Dept: GENERAL RADIOLOGY | Age: 53
DRG: 392 | End: 2018-04-03
Attending: PHYSICIAN ASSISTANT
Payer: MEDICARE

## 2018-04-03 ENCOUNTER — HOSPITAL ENCOUNTER (INPATIENT)
Age: 53
LOS: 4 days | Discharge: HOME | DRG: 392 | End: 2018-04-07
Attending: EMERGENCY MEDICINE | Admitting: SURGERY
Payer: MEDICARE

## 2018-04-03 ENCOUNTER — APPOINTMENT (OUTPATIENT)
Dept: CT IMAGING | Age: 53
DRG: 392 | End: 2018-04-03
Attending: PHYSICIAN ASSISTANT
Payer: MEDICARE

## 2018-04-03 DIAGNOSIS — K57.32 SIGMOID DIVERTICULITIS: Primary | ICD-10-CM

## 2018-04-03 PROBLEM — K57.92 ACUTE DIVERTICULITIS: Status: ACTIVE | Noted: 2018-04-03

## 2018-04-03 LAB
ALBUMIN SERPL-MCNC: 3.4 G/DL (ref 3.4–5)
ALBUMIN/GLOB SERPL: 0.6 {RATIO} (ref 0.8–1.7)
ALP SERPL-CCNC: 85 U/L (ref 45–117)
ALT SERPL-CCNC: 30 U/L (ref 13–56)
ANION GAP SERPL CALC-SCNC: 9 MMOL/L (ref 3–18)
APPEARANCE UR: CLEAR
AST SERPL-CCNC: 33 U/L (ref 15–37)
BACTERIA URNS QL MICRO: ABNORMAL /HPF
BASOPHILS # BLD: 0 K/UL (ref 0–0.06)
BASOPHILS NFR BLD: 0 % (ref 0–2)
BILIRUB SERPL-MCNC: 1.1 MG/DL (ref 0.2–1)
BILIRUB UR QL: NEGATIVE
BUN SERPL-MCNC: 18 MG/DL (ref 7–18)
BUN/CREAT SERPL: 17 (ref 12–20)
CALCIUM SERPL-MCNC: 9.2 MG/DL (ref 8.5–10.1)
CHLORIDE SERPL-SCNC: 105 MMOL/L (ref 100–108)
CO2 SERPL-SCNC: 28 MMOL/L (ref 21–32)
COLOR UR: ABNORMAL
CREAT SERPL-MCNC: 1.06 MG/DL (ref 0.6–1.3)
DIFFERENTIAL METHOD BLD: ABNORMAL
EOSINOPHIL # BLD: 0.1 K/UL (ref 0–0.4)
EOSINOPHIL NFR BLD: 2 % (ref 0–5)
EPITH CASTS URNS QL MICRO: ABNORMAL /LPF (ref 0–5)
ERYTHROCYTE [DISTWIDTH] IN BLOOD BY AUTOMATED COUNT: 14 % (ref 11.6–14.5)
GLOBULIN SER CALC-MCNC: 5.9 G/DL (ref 2–4)
GLUCOSE SERPL-MCNC: 102 MG/DL (ref 74–99)
GLUCOSE UR STRIP.AUTO-MCNC: NEGATIVE MG/DL
HCT VFR BLD AUTO: 42.3 % (ref 35–45)
HGB BLD-MCNC: 14.2 G/DL (ref 12–16)
HGB UR QL STRIP: NEGATIVE
KETONES UR QL STRIP.AUTO: NEGATIVE MG/DL
LEUKOCYTE ESTERASE UR QL STRIP.AUTO: ABNORMAL
LYMPHOCYTES # BLD: 1 K/UL (ref 0.9–3.6)
LYMPHOCYTES NFR BLD: 13 % (ref 21–52)
MCH RBC QN AUTO: 28.9 PG (ref 24–34)
MCHC RBC AUTO-ENTMCNC: 33.6 G/DL (ref 31–37)
MCV RBC AUTO: 86 FL (ref 74–97)
MONOCYTES # BLD: 0.7 K/UL (ref 0.05–1.2)
MONOCYTES NFR BLD: 9 % (ref 3–10)
NEUTS SEG # BLD: 5.7 K/UL (ref 1.8–8)
NEUTS SEG NFR BLD: 76 % (ref 40–73)
NITRITE UR QL STRIP.AUTO: NEGATIVE
PH UR STRIP: 5 [PH] (ref 5–8)
PLATELET # BLD AUTO: 84 K/UL (ref 135–420)
PMV BLD AUTO: 9.9 FL (ref 9.2–11.8)
POTASSIUM SERPL-SCNC: 4 MMOL/L (ref 3.5–5.5)
PROT SERPL-MCNC: 9.3 G/DL (ref 6.4–8.2)
PROT UR STRIP-MCNC: NEGATIVE MG/DL
RBC # BLD AUTO: 4.92 M/UL (ref 4.2–5.3)
RBC #/AREA URNS HPF: NEGATIVE /HPF (ref 0–5)
SODIUM SERPL-SCNC: 142 MMOL/L (ref 136–145)
SP GR UR REFRACTOMETRY: 1.02 (ref 1–1.03)
UROBILINOGEN UR QL STRIP.AUTO: 1 EU/DL (ref 0.2–1)
WBC # BLD AUTO: 7.5 K/UL (ref 4.6–13.2)
WBC URNS QL MICRO: ABNORMAL /HPF (ref 0–5)

## 2018-04-03 PROCEDURE — 96375 TX/PRO/DX INJ NEW DRUG ADDON: CPT

## 2018-04-03 PROCEDURE — 99283 EMERGENCY DEPT VISIT LOW MDM: CPT

## 2018-04-03 PROCEDURE — 81001 URINALYSIS AUTO W/SCOPE: CPT | Performed by: PHYSICIAN ASSISTANT

## 2018-04-03 PROCEDURE — 74011250636 HC RX REV CODE- 250/636: Performed by: PHYSICIAN ASSISTANT

## 2018-04-03 PROCEDURE — 74011250636 HC RX REV CODE- 250/636: Performed by: SURGERY

## 2018-04-03 PROCEDURE — 74176 CT ABD & PELVIS W/O CONTRAST: CPT

## 2018-04-03 PROCEDURE — 65270000029 HC RM PRIVATE

## 2018-04-03 PROCEDURE — 74011000258 HC RX REV CODE- 258: Performed by: SURGERY

## 2018-04-03 PROCEDURE — 80053 COMPREHEN METABOLIC PANEL: CPT | Performed by: PHYSICIAN ASSISTANT

## 2018-04-03 PROCEDURE — 85025 COMPLETE CBC W/AUTO DIFF WBC: CPT | Performed by: PHYSICIAN ASSISTANT

## 2018-04-03 PROCEDURE — 74011000250 HC RX REV CODE- 250: Performed by: PHYSICIAN ASSISTANT

## 2018-04-03 PROCEDURE — 74022 RADEX COMPL AQT ABD SERIES: CPT

## 2018-04-03 PROCEDURE — 96361 HYDRATE IV INFUSION ADD-ON: CPT

## 2018-04-03 PROCEDURE — 74011250637 HC RX REV CODE- 250/637: Performed by: SURGERY

## 2018-04-03 PROCEDURE — 74011000258 HC RX REV CODE- 258: Performed by: PHYSICIAN ASSISTANT

## 2018-04-03 PROCEDURE — 96365 THER/PROPH/DIAG IV INF INIT: CPT

## 2018-04-03 RX ORDER — ONDANSETRON 2 MG/ML
4 INJECTION INTRAMUSCULAR; INTRAVENOUS
Status: COMPLETED | OUTPATIENT
Start: 2018-04-03 | End: 2018-04-03

## 2018-04-03 RX ORDER — OXYCODONE AND ACETAMINOPHEN 5; 325 MG/1; MG/1
1 TABLET ORAL
Status: DISCONTINUED | OUTPATIENT
Start: 2018-04-03 | End: 2018-04-07 | Stop reason: HOSPADM

## 2018-04-03 RX ORDER — ONDANSETRON 4 MG/1
4 TABLET, ORALLY DISINTEGRATING ORAL
Status: DISCONTINUED | OUTPATIENT
Start: 2018-04-03 | End: 2018-04-07 | Stop reason: HOSPADM

## 2018-04-03 RX ORDER — DEXTROSE, SODIUM CHLORIDE, AND POTASSIUM CHLORIDE 5; .45; .15 G/100ML; G/100ML; G/100ML
50 INJECTION INTRAVENOUS CONTINUOUS
Status: DISCONTINUED | OUTPATIENT
Start: 2018-04-03 | End: 2018-04-05

## 2018-04-03 RX ORDER — FAMOTIDINE 10 MG/ML
20 INJECTION INTRAVENOUS
Status: DISCONTINUED | OUTPATIENT
Start: 2018-04-03 | End: 2018-04-03 | Stop reason: RX

## 2018-04-03 RX ORDER — MORPHINE SULFATE 4 MG/ML
5 INJECTION INTRAVENOUS
Status: DISCONTINUED | OUTPATIENT
Start: 2018-04-03 | End: 2018-04-07 | Stop reason: HOSPADM

## 2018-04-03 RX ORDER — GABAPENTIN 300 MG/1
300 CAPSULE ORAL
COMMUNITY
End: 2021-03-30

## 2018-04-03 RX ORDER — MORPHINE SULFATE 4 MG/ML
4 INJECTION INTRAVENOUS
Status: COMPLETED | OUTPATIENT
Start: 2018-04-03 | End: 2018-04-03

## 2018-04-03 RX ORDER — FAMOTIDINE 20 MG/50ML
20 INJECTION, SOLUTION INTRAVENOUS ONCE
Status: COMPLETED | OUTPATIENT
Start: 2018-04-03 | End: 2018-04-03

## 2018-04-03 RX ORDER — ACETAMINOPHEN 325 MG/1
650 TABLET ORAL
Status: DISCONTINUED | OUTPATIENT
Start: 2018-04-03 | End: 2018-04-07 | Stop reason: HOSPADM

## 2018-04-03 RX ORDER — KETOROLAC TROMETHAMINE 30 MG/ML
30 INJECTION, SOLUTION INTRAMUSCULAR; INTRAVENOUS
Status: COMPLETED | OUTPATIENT
Start: 2018-04-03 | End: 2018-04-03

## 2018-04-03 RX ADMIN — PIPERACILLIN AND TAZOBACTAM 3.38 G: 3; .375 INJECTION, POWDER, LYOPHILIZED, FOR SOLUTION INTRAVENOUS; PARENTERAL at 18:01

## 2018-04-03 RX ADMIN — KETOROLAC TROMETHAMINE 30 MG: 30 INJECTION, SOLUTION INTRAMUSCULAR at 15:12

## 2018-04-03 RX ADMIN — FAMOTIDINE 20 MG: 20 INJECTION, SOLUTION INTRAVENOUS at 15:11

## 2018-04-03 RX ADMIN — PIPERACILLIN SODIUM,TAZOBACTAM SODIUM 3.38 G: 3; .375 INJECTION, POWDER, FOR SOLUTION INTRAVENOUS at 23:56

## 2018-04-03 RX ADMIN — ONDANSETRON 4 MG: 2 INJECTION INTRAMUSCULAR; INTRAVENOUS at 17:58

## 2018-04-03 RX ADMIN — ACETAMINOPHEN 650 MG: 325 TABLET ORAL at 23:56

## 2018-04-03 RX ADMIN — MORPHINE SULFATE 4 MG: 4 INJECTION INTRAVENOUS at 17:59

## 2018-04-03 RX ADMIN — SODIUM CHLORIDE 1000 ML: 900 INJECTION, SOLUTION INTRAVENOUS at 15:10

## 2018-04-03 RX ADMIN — ONDANSETRON 4 MG: 2 INJECTION INTRAMUSCULAR; INTRAVENOUS at 15:12

## 2018-04-03 RX ADMIN — DEXTROSE MONOHYDRATE, SODIUM CHLORIDE, AND POTASSIUM CHLORIDE 100 ML/HR: 50; 4.5; 1.49 INJECTION, SOLUTION INTRAVENOUS at 17:57

## 2018-04-03 RX ADMIN — MORPHINE SULFATE 5 MG: 4 INJECTION INTRAVENOUS at 22:30

## 2018-04-03 NOTE — ED NOTES
TRANSFER - OUT REPORT:    Verbal report given to Soraya FRAZIER(name) on Ada Custard  being transferred to Room 316(unit) for routine progression of care       Report consisted of patients Situation, Background, Assessment and   Recommendations(SBAR). Information from the following report(s) SBAR, Kardex and MAR was reviewed with the receiving nurse. Lines:   Peripheral IV 04/03/18 Right Antecubital (Active)   Site Assessment Clean, dry, & intact 4/3/2018  3:00 PM   Phlebitis Assessment 0 4/3/2018  3:00 PM   Infiltration Assessment 0 4/3/2018  3:00 PM   Dressing Status Clean, dry, & intact 4/3/2018  3:00 PM   Dressing Type Transparent;Tape 4/3/2018  3:00 PM   Hub Color/Line Status Pink;Flushed;Patent 4/3/2018  3:00 PM        Opportunity for questions and clarification was provided.       Patient transported with:   [employer]

## 2018-04-03 NOTE — IP AVS SNAPSHOT
[address] [address] 
[phone] Patient: Yuliet Zaragoza MRN: [id number] GFD:[id number] About your hospitalization You were admitted on:  April 3, 2018 You last received care in the:  [address] [place] [address] You were discharged on:  April 7, 2018 Why you were hospitalized Your primary diagnosis was:  Not on File Your diagnoses also included:  Acute Diverticulitis Follow-up Information Follow up With Details Comments Contact Info Jose Saba MD   111 [facility] 108 1700 [facility] 
[phone] Jill Barnett MD   355 [facility] [address] [address] 
[phone] Patricia Badillo DO   711 [facility] 108 1700 [facility] 
[phone] John Peguero MD   Patient can only remember the practice name and not the physician Discharge Orders None A check armando indicates which time of day the medication should be taken. My Medications START taking these medications Instructions Each Dose to Equal  
 Morning Noon Evening Bedtime  
 ciprofloxacin HCl 250 mg tablet Commonly known as:  CIPRO Your last dose was: Your next dose is: Take 2 Tabs by mouth every twelve (12) hours. 500 mg  
    
   
   
   
  
 metroNIDAZOLE 500 mg tablet Commonly known as:  FLAGYL Your last dose was: Your next dose is: Take 1 Tab by mouth three (3) times daily. 500 mg CONTINUE taking these medications Instructions Each Dose to Equal  
 Morning Noon Evening Bedtime  
 gabapentin 300 mg capsule Commonly known as:  NEURONTIN Your last dose was: Your next dose is: Take 300 mg by mouth every morning. 300 mg  
    
   
   
   
  
 hydroCHLOROthiazide 25 mg tablet Commonly known as:  HYDRODIURIL Your last dose was: Your next dose is: Take 25 mg by mouth daily. 25 mg  
    
   
   
   
  
 lisinopril 20 mg tablet Commonly known as:  Fred Boehringer Your last dose was: Your next dose is: Take  by mouth daily. verapamil  mg CR tablet Commonly known as:  CALAN-SR Your last dose was: Your next dose is: Take 180 mg by mouth nightly. 180 mg Where to Get Your Medications Information on where to get these meds will be given to you by the nurse or doctor. ! Ask your nurse or doctor about these medications  
  ciprofloxacin HCl 250 mg tablet  
 metroNIDAZOLE 500 mg tablet Discharge Instructions Diverticulitis: Care Instructions Your Care Instructions Diverticulitis occurs when pouches form in the wall of the colon and become inflamed or infected. It can be very painful. Doctors aren't sure what causes diverticulitis. There is no proof that foods such as nuts, seeds, or berries cause it or make it worse. A low-fiber diet may cause the colon to work harder to push stool forward. Pouches may form because of this extra work. It may be hard to think about healthy eating while you're in pain. But as you recover, you might think about how you can use healthy eating for overall better health. Healthy eating may help you avoid future attacks. Follow-up care is a key part of your treatment and safety. Be sure to make and go to all appointments, and call your doctor if you are having problems. It's also a good idea to know your test results and keep a list of the medicines you take. How can you care for yourself at home? · Drink plenty of fluids, enough so that your urine is light yellow or clear like water. If you have kidney, heart, or liver disease and have to limit fluids, talk with your doctor before you increase the amount of fluids you drink. · Stick to liquids or a bland diet (plain rice, bananas, dry toast or crackers, applesauce) until you are feeling better. Then you can return to regular foods and gradually increase the amount of fiber in your diet. · Use a heating pad set on low on your belly to relieve mild cramps and pain. · Get extra rest until you are feeling better. · Be safe with medicines. Read and follow all instructions on the label. ¨ If the doctor gave you a prescription medicine for pain, take it as prescribed. ¨ If you are not taking a prescription pain medicine, ask your doctor if you can take an over-the-counter medicine. · If your doctor prescribed antibiotics, take them as directed. Do not stop taking them just because you feel better. You need to take the full course of antibiotics. To prevent future attacks of diverticulitis · Avoid constipation: 
¨ Include fruits, vegetables, beans, and whole grains in your diet each day. These foods are high in fiber. ¨ Drink plenty of fluids, enough so that your urine is light yellow or clear like water. If you have kidney, heart, or liver disease and have to limit fluids, talk with your doctor before you increase the amount of fluids you drink. ¨ Get some exercise every day. Build up slowly to 30 to 60 minutes a day on 5 or more days of the week. ¨ Take a fiber supplement, such as Citrucel or Metamucil, every day if needed. Read and follow all instructions on the label. ¨ Schedule time each day for a bowel movement. Having a daily routine may help. Take your time and do not strain when having a bowel movement. When should you call for help? Call your doctor now or seek immediate medical care if: 
? · You have a fever. ? · You are vomiting. ? · You have new or worse belly pain. ? · You cannot pass stools or gas. ? Watch closely for changes in your health, and be sure to contact your doctor if you have any problems. Where can you learn more? Go to http://selena-lamonte.info/. Enter H901 in the search box to learn more about \"Diverticulitis: Care Instructions. \" Current as of: May 12, 2017 Content Version: 11.4 © 7744-3873 Healthwise, [facility]. Care instructions adapted under license by [employer] (which disclaims liability or warranty for this information). If you have questions about a medical condition or this instruction, always ask your healthcare professional. [address] any warranty or liability for your use of this information. Introducing \[facility] [place] [facility]\"" & HEALTH SERVICES! Cassi Patel introduces [employer] patient portal. Now you can access parts of your medical record, email your doctor's office, and request medication refills online. 1. In your internet browser, go to [email or web address] 2. Click on the First Time User? Click Here link in the Sign In box. You will see the New Member Sign Up page. 3. Enter your [employer] Access Code exactly as it appears below. You will not need to use this code after youve completed the sign-up process. If you do not sign up before the expiration date, you must request a new code. · [employer] Access Code: [email or web address] Expires: 6/10/2018  6:57 PM 
 
4. Enter the last four digits of your Social Security Number (xxxx) and Date of Birth (mm/dd/yyyy) as indicated and click Submit. You will be taken to the next sign-up page. 5. Create a [employer] ID. This will be your [employer] login ID and cannot be changed, so think of one that is secure and easy to remember. 6. Create a [employer] password. You can change your password at any time. 7. Enter your Password Reset Question and Answer. This can be used at a later time if you forget your password. 8. Enter your e-mail address. You will receive e-mail notification when new information is available in [address] 9. Click Sign Up.  You can now view and download portions of your medical record. 10. Click the Download Summary menu link to download a portable copy of your medical information. If you have questions, please visit the Frequently Asked Questions section of the [employer] website. Remember, [employer] is NOT to be used for urgent needs. For medical emergencies, dial 911. Now available from your iPhone and Android! Introducing Keanu Troncoso As a [facility] patient, I wanted to make you aware of our electronic visit tool called Keanu Troncoso. Chewse 24/7 allows you to connect within minutes with a medical provider 24 hours a day, seven days a week via a mobile device or tablet or logging into a secure website from your computer. You can access Keanu Gonzalezfin from anywhere in the [place]. A virtual visit might be right for you when you have a simple condition and feel like you just dont want to get out of bed, or cant get away from work for an appointment, when your regular [facility] provider is not available (evenings, weekends or holidays), or when youre out of town and need minor care. Electronic visits cost only $49 and if the [employer] provider determines a prescription is needed to treat your condition, one can be electronically transmitted to a nearby pharmacy*. Please take a moment to enroll today if you have not already done so. The enrollment process is free and takes just a few minutes. To enroll, please download the [employer] rolan to your tablet or phone, or visit [email or web address] to enroll on your computer. And, as an [address] [place] [address] patient with a [employer] account, the results of your visits will be scanned into your electronic medical record and your primary care provider will be able to view the scanned results. We urge you to continue to see your regular [facility] provider for your ongoing medical care.   And while your primary care provider may not be the one available when you seek a Keanu Troncoso virtual visit, the peace of mind you get from getting a real diagnosis real time can be priceless. For more information on Keanu Troncoso, view our Frequently Asked Questions (FAQs) at [email or web address]. Sincerely, 
 
Mariam Richardson MD 
Chief Medical Officer 50Sumit Velasquez *:  certain medications cannot be prescribed via Keanu Troncoso Providers Seen During Your Hospitalization Provider Specialty Primary office phone Stephon Rebollar MD Emergency Medicine [phone] Bonnie Gallegos MD General Surgery [phone] Your Primary Care Physician (PCP) Primary Care Physician Office Phone Office Fax OTHER, PHYS ** None ** ** None ** You are allergic to the following No active allergies Recent Documentation Height Weight BMI OB Status Smoking Status 1.651 m 89.4 kg 32.78 kg/m2 Menopause Current Some Day Smoker Emergency Contacts Name Discharge Info Relation Home Work Mobile [address] CAREGIVER [3] Son [22] [phone] Patient Belongings The following personal items are in your possession at time of discharge: 
     Visual Aid: Glasses, At bedside             Clothing: At bedside, Pants, Shirt, Socks, Sweater, Footwear Please provide this summary of care documentation to your next provider. Signatures-by signing, you are acknowledging that this After Visit Summary has been reviewed with you and you have received a copy. Patient Signature:  ____________________________________________________________ Date:  ____________________________________________________________  
  
Lupe Cantu Provider Signature:  ____________________________________________________________ Date:  ____________________________________________________________

## 2018-04-03 NOTE — ED PROVIDER NOTES
[address]  EMERGENCY DEPARTMENT HISTORY AND PHYSICAL EXAM    Date: 4/3/2018  Patient Name: Maru Snow  YOB: 1965  Medical Record Number: [id number]     History of Presenting Illness     Chief Complaint   Patient presents with    Abdominal Pain    Vomiting    Diarrhea       History Provided By: Patient    Chief Complaint: Abdominal pain  Duration: 1 Days  Timing:  Constant  Location: Generalized abdomen  Quality: Aching  Severity: 10 out of 10  Modifying Factors:  Pain is worse with any PO and with coughing  Associated Symptoms: N/V/D, pelvic pressure with urinating, and abdominal bloating (increased flatus as well)    Additional History (Context):   2:52 PM  Maru Snow is a 46 y.o. female with PMHX HTN, GERD, arthritis, & hepatitis C, who presents to the emergency department C/O 10/10 generalized abdominal pain onset yesterday. Pain is worse with any PO and with coughing. Associated symptoms include N/V/D, pelvic pressure with urinating, and abdominal bloating (increased flatus as well). Pt was [facility] by [facility] for this today, had blood work and UA done, did not have XR done, and was referred to ED for further evaluation. Reports she was not doing anything out of routine when sxs began. Last colonoscopy was 2-3 years ago, which was normal and states she does not have to have another for another 8 years. Pt denies dysuria, difficulty urinating, fever, chills, and any other Sx or complaints.       Shx: +tobacco use (0.25 PPD), +EtOH use, -illicit drug use    PCP: John Peguero MD     Current Facility-Administered Medications   Medication Dose Route Frequency Provider Last Rate Last Dose    piperacillin-tazobactam (ZOSYN) 3.375 g in 0.9% sodium chloride (MBP/ADV) 100 mL MBP  3.375 g IntraVENous NOW [employer]        morphine 4 mg  4 mg IntraVENous NOW KENYETTA Agustin        ondansetron (ZOFRAN) injection 4 mg  4 mg IntraVENous NOW [employer] Current Outpatient Prescriptions   Medication Sig Dispense Refill    gabapentin (NEURONTIN) 300 mg capsule Take 300 mg by mouth two (2) times a day.  lisinopril (PRINIVIL, ZESTRIL) 20 mg tablet Take  by mouth daily.  verapamil SR (CALAN-SR) 180 mg CR tablet Take 180 mg by mouth nightly.  hydrochlorothiazide (HYDRODIURIL) 25 mg tablet Take 25 mg by mouth daily. Past History     Past Medical History:  Past Medical History:   Diagnosis Date    Arthritis     GERD (gastroesophageal reflux disease)     Hepatitis C     Hypertension     SVT (supraventricular tachycardia) (HCC)     WPW (Nahomy-Parkinson-White syndrome)        Past Surgical History:  Past Surgical History:   Procedure Laterality Date    HX KNEE ARTHROSCOPY Right     HX OTHER SURGICAL  1997    Hx knee surgery R knee    HX SVT ABLATION         Family History:  History reviewed. No pertinent family history. Social History:  Social History   Substance Use Topics    Smoking status: Current Some Day Smoker     Packs/day: 0.25     Years: 17.00     Types: Cigarettes    Smokeless tobacco: Never Used    Alcohol use 3.6 oz/week     6 Cans of beer, 0 Standard drinks or equivalent per week      Comment: socially       Allergies:  No Known Allergies      Review of Systems     Review of Systems   Constitutional: Negative for chills and fever. Gastrointestinal: Positive for abdominal pain, diarrhea, nausea and vomiting.        (+) bloating with increased flatus   Genitourinary: Positive for pelvic pain (pressure with urinating). Negative for difficulty urinating and dysuria. All other systems reviewed and are negative. Physical Exam     Vitals:    04/03/18 1408 04/03/18 1710   BP: 121/65 (!) 148/93   Pulse: 91 90   Resp: 18 16   Temp: 98.5 °F (36.9 °C)    SpO2: 100% 100%   Weight: 86.6 kg (191 lb)    Height: 5' 5\" (1.651 m)      Physical Exam   Constitutional: She is oriented to person, place, and time.  She appears well-developed and well-nourished. Non-toxic appearance. Mild pain distress   HENT:   Head: Normocephalic and atraumatic. Eyes: Conjunctivae and EOM are normal. Pupils are equal, round, and reactive to light. Neck: Normal range of motion. Neck supple. Cardiovascular: Normal rate and regular rhythm. Pulmonary/Chest: Effort normal and breath sounds normal.   Abdominal: Soft. Bowel sounds are normal. She exhibits no distension. There is tenderness. There is guarding. There is no rebound and no CVA tenderness. Musculoskeletal: Normal range of motion. Neurological: She is alert and oriented to person, place, and time. Skin: Skin is warm and dry. Psychiatric: She has a normal mood and affect. Her behavior is normal.   Nursing note and vitals reviewed. Diagnostic Study Results     Labs -     Recent Results (from the past 12 hour(s))   CBC WITH AUTOMATED DIFF    Collection Time: 04/03/18  3:05 PM   Result Value Ref Range    WBC 7.5 4.6 - 13.2 K/uL    RBC 4.92 4.20 - 5.30 M/uL    HGB 14.2 12.0 - 16.0 g/dL    HCT 42.3 35.0 - 45.0 %    MCV 86.0 74.0 - 97.0 FL    MCH 28.9 24.0 - 34.0 PG    MCHC 33.6 31.0 - 37.0 g/dL    RDW 14.0 11.6 - 14.5 %    PLATELET 84 (L) 210 - 420 K/uL    MPV 9.9 9.2 - 11.8 FL    NEUTROPHILS 76 (H) 40 - 73 %    LYMPHOCYTES 13 (L) 21 - 52 %    MONOCYTES 9 3 - 10 %    EOSINOPHILS 2 0 - 5 %    BASOPHILS 0 0 - 2 %    ABS. NEUTROPHILS 5.7 1.8 - 8.0 K/UL    ABS. LYMPHOCYTES 1.0 0.9 - 3.6 K/UL    ABS. MONOCYTES 0.7 0.05 - 1.2 K/UL    ABS. EOSINOPHILS 0.1 0.0 - 0.4 K/UL    ABS.  BASOPHILS 0.0 0.0 - 0.06 K/UL    DF AUTOMATED     METABOLIC PANEL, COMPREHENSIVE    Collection Time: 04/03/18  3:05 PM   Result Value Ref Range    Sodium 142 136 - 145 mmol/L    Potassium 4.0 3.5 - 5.5 mmol/L    Chloride 105 100 - 108 mmol/L    CO2 28 21 - 32 mmol/L    Anion gap 9 3.0 - 18 mmol/L    Glucose 102 (H) 74 - 99 mg/dL    BUN 18 7.0 - 18 MG/DL    Creatinine 1.06 0.6 - 1.3 MG/DL    BUN/Creatinine ratio 17 12 - 20      GFR est AA >60 >60 ml/min/1.73m2    GFR est non-AA 54 (L) >60 ml/min/1.73m2    Calcium 9.2 8.5 - 10.1 MG/DL    Bilirubin, total 1.1 (H) 0.2 - 1.0 MG/DL    ALT (SGPT) 30 13 - 56 U/L    AST (SGOT) 33 15 - 37 U/L    Alk. phosphatase 85 45 - 117 U/L    Protein, total 9.3 (H) 6.4 - 8.2 g/dL    Albumin 3.4 3.4 - 5.0 g/dL    Globulin 5.9 (H) 2.0 - 4.0 g/dL    A-G Ratio 0.6 (L) 0.8 - 1.7     URINALYSIS W/ RFLX MICROSCOPIC    Collection Time: 04/03/18  3:14 PM   Result Value Ref Range    Color PINK      Appearance CLEAR      Specific gravity 1.018 1.005 - 1.030      pH (UA) 5.0 5.0 - 8.0      Protein NEGATIVE  NEG mg/dL    Glucose NEGATIVE  NEG mg/dL    Ketone NEGATIVE  NEG mg/dL    Bilirubin NEGATIVE  NEG      Blood NEGATIVE  NEG      Urobilinogen 1.0 0.2 - 1.0 EU/dL    Nitrites NEGATIVE  NEG      Leukocyte Esterase SMALL (A) NEG     URINE MICROSCOPIC ONLY    Collection Time: 04/03/18  3:14 PM   Result Value Ref Range    WBC 0 to 3 0 - 5 /hpf    RBC NEGATIVE  0 - 5 /hpf    Epithelial cells 1+ 0 - 5 /lpf    Bacteria FEW (A) NEG /hpf       Radiologic Studies -   CT Results  (Last 48 hours)               04/03/18 1621  CT ABD PELV WO CONT Final result    Impression:  IMPRESSION:       1. Acute sigmoid colon diverticulitis with a few punctate foci of extraluminal   air, suggestive of contained microperforations. No bowel obstruction, pelvic   abscess or other associated secondary complication. 2. Nonobstructive right and left renal calculi. 3. Hepatic morphology suggestive of hepatic cirrhosis. Narrative:   CT OF THE ABDOMEN AND PELVIS, WITHOUT CONTRAST       INDICATION: Abdominal pain, vomiting, and diarrhea since yesterday       COMPARISON: CT abdomen/pelvis 3/12/2018. TECHNIQUE: Standard helical CT performed through the abdomen and pelvis without   IV contrast.  Coronal and sagittal reformations were generated.         One or more dose reduction techniques were used on this CT: automated exposure   control, adjustment of the mAs and/or kVp according to patient's size, and   iterative reconstruction techniques. The specific techniques utilized on this CT   exam have been documented in the patient's electronic medical record.       ============       FINDINGS:       INFERIOR THORAX: No acute pulmonary infiltrate. No global cardiomegaly or   pericardial effusion. LIVER/BILIARY: Peripheral micronodular hepatic contour. No suspicious liver   lesion on this unenhanced CT. No biliary dilation. Gallbladder unremarkable. SPLEEN: Normal.       PANCREAS: Normal.       ADRENALS: Normal.       KIDNEYS: Nonobstructive 3 mm right renal calculus. Nonobstructive 2 mm left   renal calculus. No hydronephrosis or evidence of an obstructive uropathy. LYMPH NODES: No mesenteric or retroperitoneal lymphadenopathy. GI TRACT: There is extensive diverticulosis throughout the distal descending and   sigmoid colon. Heterogeneous mural thickening is seen throughout the proximal   midportion of the sigmoid colon as well. There is moderate adjacent mesenteric   inflammatory stranding. There are a few very small and contained foci of   extraluminal air, suggestive of small microperforations. No large extraluminal   collection to suggest developing abscess at this time. Normal appendix. Normal   caliber small and large bowel loops elsewhere throughout the abdomen and pelvis. No morphology of bowel obstruction. VASCULATURE: Normal caliber distal thoracic and abdominal aorta with mild   atherosclerotic vascular calcification. PELVIC ORGANS: Minimally distended bladder is not well evaluated. No acute   gynecologic abnormality. There are a few heterogeneously calcified right   paracentral uterine fibroids. BONES: No acute osseous abnormality. Moderately severe and asymmetric   intervertebral disc space height loss and endplate spondylosis anteriorly L2-L4.        OTHER: No significant abdominal or pelvic ascites. A few punctate foci of   extraluminal air are seen adjacent to the sigmoid colon, as described   previously.       ============               CXR Results  (Last 48 hours)               04/03/18 1537  XR ABD ACUTE W 1 V CHEST Final result    Impression:  IMPRESSION:       1. No acute radiographic cardiopulmonary abnormality. 2. Nonobstructive and nonspecific bowel gas pattern without radiographic   evidence of acute abnormality. Narrative:  PROCEDURE: PA chest x-ray with abdominal series       CLINICAL HISTORY: Abdominal pain, vomiting, diarrhea. COMPARISON: 4/3/2017: CT 3/12/2018       FINDINGS:       Frontal chest demonstrates clear lungs. Cardiac, hilar and mediastinal contours   are normal. No acute bony abnormality. Abdominal series (supine and upright radiographs) demonstrates no free   intraperitoneal gas. Nonobstructive and nonspecific bowel gas pattern. Phleboliths project over the pelvis. No acute osseous abnormality. Medications Given in the ED:  Medications   piperacillin-tazobactam (ZOSYN) 3.375 g in 0.9% sodium chloride (MBP/ADV) 100 mL MBP (not administered)   morphine 4 mg (not administered)   ondansetron (ZOFRAN) injection 4 mg (not administered)   ondansetron (ZOFRAN) injection 4 mg (4 mg IntraVENous Given 4/3/18 1512)   ketorolac (TORADOL) injection 30 mg (30 mg IntraVENous Given 4/3/18 1512)   sodium chloride 0.9 % bolus infusion 1,000 mL (0 mL IntraVENous IV Completed 4/3/18 1610)   famotidine (PEPCID) IVPB 20 mg (0 mg IntraVENous IV Completed 4/3/18 1541)        Medical Decision Making     I am the first provider for this patient. I reviewed the vital signs, available nursing notes, past medical history, past surgical history, family history and social history. Records Reviewed: Nursing Notes, Old Medical Records and Previous Laboratory Studies   Labs from [facility] today, 4/3/18: UA shows no infection.  Blood work appears normal.     Vital Signs-Reviewed the patient's vital signs. Patient Vitals for the past 12 hrs:   Temp Pulse Resp BP SpO2   04/03/18 1710 - 90 16 (!) 148/93 100 %   04/03/18 1408 98.5 °F (36.9 °C) 91 18 121/65 100 %       Pulse Oximetry Analysis - Normal 100% on RA        Procedures:  Procedures     ED Course:   2:52 PM Initial assessment performed. Pt and/or pt's family are aware of the plan of care and are in agreement. 5:13 PM Consult Note: Discussed patient's history, exam, and available diagnostics results in person with Leo Mcgowan MD, ED Attending, who reviewed diagnosis. Discussed tx plan. Suggest consult with general surgery. Agrees with Zosyn IV. Likely with require hospitalist admission. 5:17 PM Consult Note: Discussed patient's history, exam, and available diagnostics results over the telephone with Shahriar Stone MD, general surgery, who agrees to admit to her service. Agrees with Zosyn abx. She will be going to Medical.        Diagnosis and Disposition       Core Measures:  For Hospitalized Patients:    1. Hospitalization Decision Time:  The decision to hospitalize the patient was made by Leo Mcgowan MD and Patricia Naranjo PA-C at 5:12 PM on 4/3/2018    2. Aspirin: Aspirin was not given because the patient did not present with a stroke at the time of their Emergency Department evaluation    3. Plan: Admit to Medical    5:19 PM  Patient is being admitted to the hospital by Shahriar Stone MD. The results of their tests and reasons for their admission have been discussed with them and/or available family. They convey agreement and understanding for the need to be admitted and for their admission diagnosis. Conditions on Admission:  Sepsis is not present at the time of admission. Deep Vein Thrombosis is not present at the time of admission. Thrombosis is not present at the time of admission. Urinary Tract Infection is not present at the time of admission.  Pneumonia is not present at the time of admission. MRSA is not present at the time of admission. Wound infection is not present at the time of admission. Pressure Ulcer is not present at the time of admission. Clinical Impression:    1. Sigmoid diverticulitis    with contained microperforations     _______________________________    Attestations: This note is prepared by Russ Veronica, acting as Scribe for Soledad Hong PA-C. Soledad Hong PA-C:  The scribe's documentation has been prepared under my direction and personally reviewed by me in its entirety.   I confirm that the note above accurately reflects all work, treatment, procedures, and medical decision making performed by me.  _______________________________

## 2018-04-03 NOTE — ED TRIAGE NOTES
Pt c/o abd pain, vomiting, diarrhea onset yest, pt states seen at [facility] today, pt states sent pt here for further eval.   Sepsis Screening completed    (  )Patient meets SIRS criteria. ( x )Patient does not meet SIRS criteria.       SIRS Criteria is achieved when two or more of the following are present   Temperature < 96.8°F (36°C) or > 100.9°F (38.3°C)   Heart Rate > 90 beats per minute   Respiratory Rate > 20 breaths per minute   WBC count > 12,000 or <4,000 or > 10% bands

## 2018-04-03 NOTE — IP AVS SNAPSHOT
[place] 
 
 
 [address] [facility] [address] 
[phone] Patient: Nioks Leal MRN: [id number] NKO:[id number] A check armando indicates which time of day the medication should be taken. My Medications START taking these medications Instructions Each Dose to Equal  
 Morning Noon Evening Bedtime  
 ciprofloxacin HCl 250 mg tablet Commonly known as:  CIPRO Your last dose was: Your next dose is: Take 2 Tabs by mouth every twelve (12) hours. 500 mg  
    
   
   
   
  
 metroNIDAZOLE 500 mg tablet Commonly known as:  FLAGYL Your last dose was: Your next dose is: Take 1 Tab by mouth three (3) times daily. 500 mg CONTINUE taking these medications Instructions Each Dose to Equal  
 Morning Noon Evening Bedtime  
 gabapentin 300 mg capsule Commonly known as:  NEURONTIN Your last dose was: Your next dose is: Take 300 mg by mouth every morning. 300 mg  
    
   
   
   
  
 hydroCHLOROthiazide 25 mg tablet Commonly known as:  HYDRODIURIL Your last dose was: Your next dose is: Take 25 mg by mouth daily. 25 mg  
    
   
   
   
  
 lisinopril 20 mg tablet Commonly known as:  Bhavik Rebecca Your last dose was: Your next dose is: Take  by mouth daily. verapamil  mg CR tablet Commonly known as:  CALAN-SR Your last dose was: Your next dose is: Take 180 mg by mouth nightly. 180 mg Where to Get Your Medications Information on where to get these meds will be given to you by the nurse or doctor. ! Ask your nurse or doctor about these medications  
  ciprofloxacin HCl 250 mg tablet  
 metroNIDAZOLE 500 mg tablet

## 2018-04-04 PROCEDURE — 74011250637 HC RX REV CODE- 250/637: Performed by: SURGERY

## 2018-04-04 PROCEDURE — 65270000029 HC RM PRIVATE

## 2018-04-04 PROCEDURE — 74011000258 HC RX REV CODE- 258: Performed by: SURGERY

## 2018-04-04 PROCEDURE — 74011250636 HC RX REV CODE- 250/636: Performed by: SURGERY

## 2018-04-04 RX ORDER — HYDROCHLOROTHIAZIDE 25 MG/1
25 TABLET ORAL DAILY
Status: DISCONTINUED | OUTPATIENT
Start: 2018-04-04 | End: 2018-04-07 | Stop reason: HOSPADM

## 2018-04-04 RX ORDER — GABAPENTIN 300 MG/1
300 CAPSULE ORAL
Status: DISCONTINUED | OUTPATIENT
Start: 2018-04-04 | End: 2018-04-07 | Stop reason: HOSPADM

## 2018-04-04 RX ORDER — VERAPAMIL HYDROCHLORIDE 180 MG/1
180 TABLET, EXTENDED RELEASE ORAL
Status: DISCONTINUED | OUTPATIENT
Start: 2018-04-04 | End: 2018-04-07 | Stop reason: HOSPADM

## 2018-04-04 RX ORDER — LISINOPRIL 20 MG/1
20 TABLET ORAL DAILY
Status: DISCONTINUED | OUTPATIENT
Start: 2018-04-04 | End: 2018-04-07 | Stop reason: HOSPADM

## 2018-04-04 RX ADMIN — PIPERACILLIN SODIUM,TAZOBACTAM SODIUM 3.38 G: 3; .375 INJECTION, POWDER, FOR SOLUTION INTRAVENOUS at 05:51

## 2018-04-04 RX ADMIN — OXYCODONE HYDROCHLORIDE AND ACETAMINOPHEN 1 TABLET: 5; 325 TABLET ORAL at 22:19

## 2018-04-04 RX ADMIN — MORPHINE SULFATE 5 MG: 4 INJECTION INTRAVENOUS at 14:30

## 2018-04-04 RX ADMIN — HYDROCHLOROTHIAZIDE 25 MG: 25 TABLET ORAL at 10:47

## 2018-04-04 RX ADMIN — PIPERACILLIN SODIUM,TAZOBACTAM SODIUM 3.38 G: 3; .375 INJECTION, POWDER, FOR SOLUTION INTRAVENOUS at 13:22

## 2018-04-04 RX ADMIN — LISINOPRIL 20 MG: 20 TABLET ORAL at 10:47

## 2018-04-04 RX ADMIN — GABAPENTIN 300 MG: 300 CAPSULE ORAL at 10:47

## 2018-04-04 RX ADMIN — MORPHINE SULFATE 5 MG: 4 INJECTION INTRAVENOUS at 04:01

## 2018-04-04 RX ADMIN — MORPHINE SULFATE 5 MG: 4 INJECTION INTRAVENOUS at 07:42

## 2018-04-04 RX ADMIN — PIPERACILLIN SODIUM,TAZOBACTAM SODIUM 3.38 G: 3; .375 INJECTION, POWDER, FOR SOLUTION INTRAVENOUS at 23:00

## 2018-04-04 RX ADMIN — OXYCODONE HYDROCHLORIDE AND ACETAMINOPHEN 1 TABLET: 5; 325 TABLET ORAL at 10:47

## 2018-04-04 RX ADMIN — PIPERACILLIN SODIUM,TAZOBACTAM SODIUM 3.38 G: 3; .375 INJECTION, POWDER, FOR SOLUTION INTRAVENOUS at 18:16

## 2018-04-04 RX ADMIN — OXYCODONE HYDROCHLORIDE AND ACETAMINOPHEN 1 TABLET: 5; 325 TABLET ORAL at 18:15

## 2018-04-04 RX ADMIN — DEXTROSE MONOHYDRATE, SODIUM CHLORIDE, AND POTASSIUM CHLORIDE 100 ML/HR: 50; 4.5; 1.49 INJECTION, SOLUTION INTRAVENOUS at 04:04

## 2018-04-04 RX ADMIN — DEXTROSE MONOHYDRATE, SODIUM CHLORIDE, AND POTASSIUM CHLORIDE 50 ML/HR: 50; 4.5; 1.49 INJECTION, SOLUTION INTRAVENOUS at 22:58

## 2018-04-04 RX ADMIN — VERAPAMIL HYDROCHLORIDE 180 MG: 180 TABLET, FILM COATED, EXTENDED RELEASE ORAL at 22:19

## 2018-04-04 NOTE — PROGRESS NOTES
Assumed care. Pt resting in bed left side. No distress noted or verbalized at this point. Will reassess and continue with care. Bedside report received from [address] [place] [address]

## 2018-04-04 NOTE — PROGRESS NOTES
2000: patient arrived to floor, assessment complete, pain 3/10. No skin issues present on exam   2200: pain medication given. 2345: notified of tempeture will call Paul for Medication    2355: Dr. Bryan Nieves called and tylenol prescribed. 0200: Patient appears to be sleeping.  0345: up to bathroom. 0400: medicated for pain    Patient Vitals for the past 12 hrs:   Temp Pulse Resp BP SpO2   04/04/18 0409 99.8 °F (37.7 °C) 82 18 144/74 92 %   04/03/18 2334 (!) 101.9 °F (38.8 °C) 89 18 148/76 94 %   04/03/18 2012 99.7 °F (37.6 °C) 83 16 150/79 96 %     Bedside shift change report given to Mariah Marie RN (oncoming nurse) by Lori Bazzi RN (offgoing nurse). Report included the following information SBAR, Kardex, MAR and Recent Results.

## 2018-04-04 NOTE — ROUTINE PROCESS
TRANSFER - OUT REPORT:    Verbal report given to Nicole Kendall RN on Lisseth Mention  being transferred to  for routine progression of care       Report consisted of patients Situation, Background, Assessment and   Recommendations(SBAR). Information from the following report(s) SBAR was reviewed with the receiving nurse. Lines:   Peripheral IV 04/03/18 Right Antecubital (Active)   Site Assessment Clean, dry, & intact 4/4/2018  8:30 AM   Phlebitis Assessment 0 4/4/2018  8:30 AM   Infiltration Assessment 0 4/4/2018  8:30 AM   Dressing Status Clean, dry, & intact 4/4/2018  8:30 AM   Dressing Type Transparent;Tape 4/4/2018  4:28 AM   Hub Color/Line Status Pink 4/4/2018  4:28 AM   Action Taken Open ports on tubing capped 4/4/2018  4:28 AM   Alcohol Cap Used Yes 4/4/2018  4:28 AM        Opportunity for questions and clarification was provided.       Veronika

## 2018-04-04 NOTE — PROGRESS NOTES
Nursing Supervisor Eval/Progress Note: Plan for patient to transfer to ICU post operatively. Room 313  Evaluated for belongings, undergarments, shirt, pants, socks, shoes and belt with personal folder in  [facility] Belongings bag witnessed by Van Caldwell RN  And this Nurse. Taken to Room 105 ICU.

## 2018-04-04 NOTE — H&P
[facility] [place]  PRE-OP HISTORY AND PHYSICAL    Yadira Mark  MR#: [id number]  : 1965  ACCOUNT #: [de-identified]   DATE OF SERVICE: 2018    Admission date:  2018. CHIEF COMPLAINT:  Abdominal pain. HISTORY OF PRESENT ILLNESS:  A 59-year-old female who developed abdominal pain several days ago. It was mostly in the left lower quadrant. It was associated with diarrhea. No nausea or vomiting. No fevers at home. No hematuria, dysuria or pneumaturia. She had a colonoscopy 2-4 years ago, which was normal.  She has never had pain like this before. PAST MEDICAL HISTORY:  Remarkable for hypertension, gastroesophageal reflux disease, hepatitis C, arthritis and history of SVT and Nahomy-Parkinson-White syndrome. SURGERIES: Have included arthroscopy and knee surgery. No abdominal surgery. MEDICATIONS:  Prior to admission are Neurontin, Prinivil, Calan and HydroDIURIL. ALLERGIES:  NO ALLERGIES. FAMILY HISTORY:  Noncontributory. SOCIAL HISTORY:  She does smoke, social alcohol use. REVIEW OF SYSTEMS:  No headaches or fevers or chills at home. She did have fever and chills last night. No sore throat, tinnitus, rhinorrhea or vertigo. No sputum production or wheezing. No angina, dyspnea, cardiac arrhythmia or syncopal episode. No hematemesis, no blood per rectum. No hematuria, dysuria or pneumaturia. No seizures. No skin lesions. PHYSICAL EXAMINATION:  GENERAL:  On exam, patient is awake and alert, in no distress. SKIN:  Warm and dry. Skin and sclerae anicteric. NECK:  No cervical lymphadenopathy or JVD. LUNGS:  Clear bilaterally. HEART:  Regular rate and rhythm. ABDOMEN:  Obese. Bowel sounds are present. Tender in the left lower quadrant, but no peritoneal signs. EXTREMITIES:  No extremity edema grossly. NEUROLOGIC:  Intact. IMPRESSION:  Sigmoid diverticulitis with microperforation. PLAN:  To treat with IV antibiotic.   I will have her follow up with Dr. Annabel Woodard as an outpatient. MD Vitaliy Gracia / ENRIQUE  D: 04/04/2018 09:20     T: 04/04/2018 10:16  JOB #: [id number]

## 2018-04-04 NOTE — PROGRESS NOTES
0745: Assessment complete. Pt c/o pain- medicated with morphine. No other concerns at the present. 0830: Pain at a zero  1030: Pain has returned. Given 1 Oxy. Will continue to monitor. No other changes.

## 2018-04-04 NOTE — PROGRESS NOTES
Chart reviewed. Pt admitted for diverticulitis. CM will follow for discharge planning needs. 1030  Met with patient at bedside. Pts son or DIL will drive her home at discharge. Pt states she typically takes \"the medical cab\" to [facility]. Pts PCP MD Nam. Pt denies using any DME or [facility] services. Pts discharge date: TBD. CM will cont to follow for discharge planning needs. Readmission Risk Assessment:     Moderate Risk and MSSP/Good Help ACO patients    RRAT Score:  13-20    Initial Assessment:  Per chart, pt is a 46 y.o. female with PMHX HTN, GERD, arthritis, & hepatitis C, who presents to the emergency department C/O 10/10 generalized abdominal pain onset yesterday. Pain is worse with any PO and with coughing. Associated symptoms include N/V/D, pelvic pressure with urinating, and abdominal bloating (increased flatus as well). Pt was [facility] by [facility] for this today, had blood work and UA done, did not have XR done, and was referred to ED for further evaluation. Reports she was not doing anything out of routine when sxs began. Last colonoscopy was 2-3 years ago, which was normal and states she does not have to have another for another 8 years     Emergency Contact:     Name Relation Home Work [id number] [address] [address] [place] Son [phone]      Pertinent Medical Hx:    HTN, GERD, Hep C, arthritis     PCP/Specialists:         Community Services:       DME:          Moderate Risk Care Transition Plan:  1. Evaluate for [facility] or H, SNF, acute rehab, community care coordination of resources. 2. Involve patient/caregiver in assessment, planning, education and implement of intervention. 3. CM daily patient care huddles/interdisciplinary rounds. 4. PCP/Specialist appointment within 5  7 days made prior to discharge. 5. Facilitate transportation and logistics for follow-up appointments. 6. Medication reconciliation [address]  7.  Formal handoff between hospital provider and post-acute provider to transition patient  Handoff to [facility] Nurse Navigator or PCP practice. Care Management Interventions  PCP Verified by CM: Yes ([facility]?)  Mode of Transport at Discharge:  Other (see comment) (family)  Transition of Care Consult (CM Consult): Discharge Planning  Current Support Network: Own Home  Confirm Follow Up Transport: Cab (takes \"medical cab\")  Plan discussed with Pt/Family/Caregiver: Yes  Discharge Location  Discharge Placement: Home with family assistance

## 2018-04-05 PROCEDURE — 65270000029 HC RM PRIVATE

## 2018-04-05 PROCEDURE — 74011250637 HC RX REV CODE- 250/637: Performed by: SURGERY

## 2018-04-05 PROCEDURE — 74011000258 HC RX REV CODE- 258: Performed by: SURGERY

## 2018-04-05 PROCEDURE — 77030032490 HC SLV COMPR SCD KNE COVD -B

## 2018-04-05 PROCEDURE — 74011250636 HC RX REV CODE- 250/636: Performed by: SURGERY

## 2018-04-05 RX ADMIN — OXYCODONE HYDROCHLORIDE AND ACETAMINOPHEN 1 TABLET: 5; 325 TABLET ORAL at 17:13

## 2018-04-05 RX ADMIN — OXYCODONE HYDROCHLORIDE AND ACETAMINOPHEN 1 TABLET: 5; 325 TABLET ORAL at 21:43

## 2018-04-05 RX ADMIN — OXYCODONE HYDROCHLORIDE AND ACETAMINOPHEN 1 TABLET: 5; 325 TABLET ORAL at 07:15

## 2018-04-05 RX ADMIN — PIPERACILLIN SODIUM,TAZOBACTAM SODIUM 3.38 G: 3; .375 INJECTION, POWDER, FOR SOLUTION INTRAVENOUS at 11:17

## 2018-04-05 RX ADMIN — ONDANSETRON 4 MG: 4 TABLET, ORALLY DISINTEGRATING ORAL at 14:07

## 2018-04-05 RX ADMIN — OXYCODONE HYDROCHLORIDE AND ACETAMINOPHEN 1 TABLET: 5; 325 TABLET ORAL at 11:14

## 2018-04-05 RX ADMIN — GABAPENTIN 300 MG: 300 CAPSULE ORAL at 07:10

## 2018-04-05 RX ADMIN — HYDROCHLOROTHIAZIDE 25 MG: 25 TABLET ORAL at 09:21

## 2018-04-05 RX ADMIN — PIPERACILLIN SODIUM,TAZOBACTAM SODIUM 3.38 G: 3; .375 INJECTION, POWDER, FOR SOLUTION INTRAVENOUS at 17:35

## 2018-04-05 RX ADMIN — PIPERACILLIN SODIUM,TAZOBACTAM SODIUM 3.38 G: 3; .375 INJECTION, POWDER, FOR SOLUTION INTRAVENOUS at 07:10

## 2018-04-05 RX ADMIN — OXYCODONE HYDROCHLORIDE AND ACETAMINOPHEN 1 TABLET: 5; 325 TABLET ORAL at 03:19

## 2018-04-05 RX ADMIN — LISINOPRIL 20 MG: 20 TABLET ORAL at 09:21

## 2018-04-05 RX ADMIN — VERAPAMIL HYDROCHLORIDE 180 MG: 180 TABLET, FILM COATED, EXTENDED RELEASE ORAL at 21:43

## 2018-04-05 NOTE — ROUTINE PROCESS
Bedside and Verbal shift change report given to ANDI Foster RN (oncoming nurse) by Edilberto Lanza (offgoing nurse). Report included the following information SBAR, Kardex, Intake/Output and MAR.

## 2018-04-05 NOTE — PROGRESS NOTES
Shift Summary:  Required Roxicodone 10 mg po x 2 overnight for pain to abdomen rating at 6/10, which provided relief. Vital signs stable.

## 2018-04-05 NOTE — PROGRESS NOTES
Bedside and verbal report given to Elmer Soto RN, with use of kardex. Rounded on pt Q1 hour throughout shift, no s/s distress nor discomfort noted, call bell in reach.

## 2018-04-05 NOTE — PROGRESS NOTES
Progress Note    Patient: Ata Alfaro MRN: [id number]  CSN: [id number]    YOB: 1965  Age: 46 y.o. Sex: female    DOA: 4/3/2018 LOS:  LOS: 2 days                    Subjective:     Pain is better    Objective:      Visit Vitals    /56 (BP 1 Location: Left arm, BP Patient Position: At rest)    Pulse 61    Temp 98.8 °F (37.1 °C)    Resp 16    Ht 5' 5\" (1.651 m)    Wt 93.3 kg (205 lb 9.6 oz)    SpO2 98%    BMI 34.21 kg/m2       Physical Exam:  Awake and alert, no distress, skin warm and dry, abd flat and NT, nl resp effort, RRR, neuro grossly nl    Intake and Output:  Current Shift:  04/05 0701 - 04/05 1900  In: 880 [I.V.:889]  Out: -   Last three shifts:  04/03 1901 - 04/05 0700  In: 640 [P.O.:240; I.V.:400]  Out: -     Labs: Results:       Chemistry Recent Labs      04/03/18   1505   GLU  102*   NA  142   K  4.0   CL  105   CO2  28   BUN  18   CREA  1.06   CA  9.2   AGAP  9   BUCR  17   AP  85   TP  9.3*   ALB  3.4   GLOB  5.9*   AGRAT  0.6*      CBC w/Diff Recent Labs      04/03/18   1505   WBC  7.5   RBC  4.92   HGB  14.2   HCT  42.3   PLT  84*   GRANS  76*   LYMPH  13*   EOS  2      Cardiac Enzymes No results for input(s): CPK, CKND1, GHASSAN in the last 72 hours. No lab exists for component: CKRMB, TROIP   Coagulation No results for input(s): PTP, INR, APTT in the last 72 hours. No lab exists for component: INREXT    Lipid Panel No results found for: CHOL, CHOLPOCT, CHOLX, CHLST, CHOLV, 529252, HDL, LDL, LDLC, DLDLP, 204055, VLDLC, VLDL, TGLX, TRIGL, TRIGP, TGLPOCT, CHHD, CHHDX   BNP No results for input(s): BNPP in the last 72 hours.    Liver Enzymes Recent Labs      04/03/18   1505   TP  9.3*   ALB  3.4   AP  85   SGOT  33      Thyroid Studies No results found for: T4, T3U, TSH, TSHEXT         Medications Reviewed      Assessment/Plan     Active Problems:    Acute diverticulitis (4/3/2018)        Cont IV abx, d/c IVF

## 2018-04-05 NOTE — PROGRESS NOTES
SHIFT SUMMARY  Pt has been stable day shift. Continued to request PRN pain meds round the clock. No major concerns to report. Bedside shift change report given to SANAM Brennan RN Report included the following information SBAR, Kardex, Intake/Output, MAR and Recent Results.

## 2018-04-05 NOTE — PROGRESS NOTES
0735-received report from [facility] [employer] included SBAR MAR and Kardex. Shift summary no change in pt status. Bedside and Verbal shift change report given to Dandre Robles RN (oncoming nurse) by Odilon Osei RN (offgoing nurse). Report included the following information SBAR, Kardex and MAR.

## 2018-04-06 PROCEDURE — 74011000258 HC RX REV CODE- 258: Performed by: SURGERY

## 2018-04-06 PROCEDURE — 74011250637 HC RX REV CODE- 250/637: Performed by: SURGERY

## 2018-04-06 PROCEDURE — 74011250636 HC RX REV CODE- 250/636: Performed by: SURGERY

## 2018-04-06 PROCEDURE — 65270000029 HC RM PRIVATE

## 2018-04-06 RX ADMIN — OXYCODONE HYDROCHLORIDE AND ACETAMINOPHEN 1 TABLET: 5; 325 TABLET ORAL at 10:20

## 2018-04-06 RX ADMIN — HYDROCHLOROTHIAZIDE 25 MG: 25 TABLET ORAL at 10:20

## 2018-04-06 RX ADMIN — PIPERACILLIN SODIUM,TAZOBACTAM SODIUM 3.38 G: 3; .375 INJECTION, POWDER, FOR SOLUTION INTRAVENOUS at 06:02

## 2018-04-06 RX ADMIN — VERAPAMIL HYDROCHLORIDE 180 MG: 180 TABLET, FILM COATED, EXTENDED RELEASE ORAL at 22:14

## 2018-04-06 RX ADMIN — LISINOPRIL 20 MG: 20 TABLET ORAL at 10:20

## 2018-04-06 RX ADMIN — OXYCODONE HYDROCHLORIDE AND ACETAMINOPHEN 1 TABLET: 5; 325 TABLET ORAL at 20:23

## 2018-04-06 RX ADMIN — GABAPENTIN 300 MG: 300 CAPSULE ORAL at 06:02

## 2018-04-06 RX ADMIN — PIPERACILLIN SODIUM,TAZOBACTAM SODIUM 3.38 G: 3; .375 INJECTION, POWDER, FOR SOLUTION INTRAVENOUS at 11:07

## 2018-04-06 RX ADMIN — OXYCODONE HYDROCHLORIDE AND ACETAMINOPHEN 1 TABLET: 5; 325 TABLET ORAL at 03:33

## 2018-04-06 RX ADMIN — PIPERACILLIN SODIUM,TAZOBACTAM SODIUM 3.38 G: 3; .375 INJECTION, POWDER, FOR SOLUTION INTRAVENOUS at 00:59

## 2018-04-06 RX ADMIN — OXYCODONE HYDROCHLORIDE AND ACETAMINOPHEN 1 TABLET: 5; 325 TABLET ORAL at 15:22

## 2018-04-06 RX ADMIN — PIPERACILLIN SODIUM,TAZOBACTAM SODIUM 3.38 G: 3; .375 INJECTION, POWDER, FOR SOLUTION INTRAVENOUS at 17:27

## 2018-04-06 NOTE — PROGRESS NOTES
Pt has been ambulating in the halls independently. No plan of care needs have been identified. Anticipate pt will be discharged with in the next 24-48 hours. CM remains available to assist.    Care Management Interventions  PCP Verified by CM: Yes (MD Griffin?)  Mode of Transport at Discharge:  Other (see comment) (family)  Transition of Care Consult (CM Consult): Discharge Planning  Current Support Network: Own Home  Confirm Follow Up Transport: Cab (takes \"medical cab\")  Plan discussed with Pt/Family/Caregiver: Yes  Discharge Location  Discharge Placement: Home with family assistance

## 2018-04-06 NOTE — PROGRESS NOTES
Bedside, Verbal and Written shift change report given to Briana Montalvo RN (oncoming nurse) by Tommy Balderas RN (offgoing nurse). Report included the following information SBAR, Kardex, Procedure Summary, Intake/Output, MAR, Accordion, Recent Results and Med Rec Status. All questions answered. Patient resting in bed at this time.

## 2018-04-06 NOTE — ROUTINE PROCESS
Bedside and Verbal shift change report given to Keisha Pompa RN (oncoming nurse) by Alice Leyva RN   (offgoing nurse). Report included the following information SBAR, Kardex and MAR.

## 2018-04-06 NOTE — ROUTINE PROCESS
Bedside and Verbal shift change report given to Brian Martinez RN (oncoming nurse) by Nilesh Castillo RN (offgoing nurse). Report included the following information SBAR, Kardex, ED Summary, Procedure Summary, Intake/Output, MAR, Recent Results and Med Rec Status.

## 2018-04-06 NOTE — PROGRESS NOTES
Progress Note    Patient: Nguyen Preston MRN: [id number]  CSN: [id number]    YOB: 1965  Age: 46 y.o. Sex: female    DOA: 4/3/2018 LOS:  LOS: 3 days                    Subjective:     Sleeping but arousable, in no distress, she states pain is 6/10    Objective:      Visit Vitals    /64 (BP 1 Location: Left arm, BP Patient Position: At rest)    Pulse 67    Temp 98.5 °F (36.9 °C)    Resp 18    Ht 5' 5\" (1.651 m)    Wt 81.7 kg (180 lb 1.6 oz)    SpO2 95%    BMI 29.97 kg/m2       Physical Exam:  Awake and alert, no distress, skin warm and dry, nl resp effort, RRR, abd flat, sl tenderness in LLQ, no peritoneal signs, no mass, neuro grossly nl    Intake and Output:  Current Shift:     Last three shifts:  04/04 1901 - 04/06 0700  In: 1629 [P.O.:340; I.V.:1289]  Out: 3 [Urine:3]    Labs: Results:       Chemistry Recent Labs      04/03/18   1505   GLU  102*   NA  142   K  4.0   CL  105   CO2  28   BUN  18   CREA  1.06   CA  9.2   AGAP  9   BUCR  17   AP  85   TP  9.3*   ALB  3.4   GLOB  5.9*   AGRAT  0.6*      CBC w/Diff Recent Labs      04/03/18   1505   WBC  7.5   RBC  4.92   HGB  14.2   HCT  42.3   PLT  84*   GRANS  76*   LYMPH  13*   EOS  2      Cardiac Enzymes No results for input(s): CPK, CKND1, GHASSAN in the last 72 hours. No lab exists for component: CKRMB, TROIP   Coagulation No results for input(s): PTP, INR, APTT in the last 72 hours. No lab exists for component: INREXT    Lipid Panel No results found for: CHOL, CHOLPOCT, CHOLX, CHLST, CHOLV, 713872, HDL, LDL, LDLC, DLDLP, 849004, VLDLC, VLDL, TGLX, TRIGL, TRIGP, TGLPOCT, CHHD, CHHDX   BNP No results for input(s): BNPP in the last 72 hours.    Liver Enzymes Recent Labs      04/03/18   1505   TP  9.3*   ALB  3.4   AP  85   SGOT  33      Thyroid Studies No results found for: T4, T3U, TSH, TSHEXT         Medications Reviewed      Assessment/Plan     Active Problems:    Acute diverticulitis (4/3/2018)        Cont IV abx, likely discharge over the weekend, will refer to Dr Kwabena Jim post discharge

## 2018-04-07 VITALS
DIASTOLIC BLOOD PRESSURE: 68 MMHG | OXYGEN SATURATION: 100 % | RESPIRATION RATE: 16 BRPM | BODY MASS INDEX: 32.82 KG/M2 | SYSTOLIC BLOOD PRESSURE: 136 MMHG | HEIGHT: 65 IN | HEART RATE: 61 BPM | TEMPERATURE: 97.7 F | WEIGHT: 197 LBS

## 2018-04-07 PROCEDURE — 74011250636 HC RX REV CODE- 250/636: Performed by: SURGERY

## 2018-04-07 PROCEDURE — 74011000258 HC RX REV CODE- 258: Performed by: SURGERY

## 2018-04-07 PROCEDURE — 74011250637 HC RX REV CODE- 250/637: Performed by: SURGERY

## 2018-04-07 RX ORDER — CIPROFLOXACIN 250 MG/1
500 TABLET, FILM COATED ORAL EVERY 12 HOURS
Qty: 20 TAB | Refills: 0 | Status: SHIPPED | OUTPATIENT
Start: 2018-04-07 | End: 2018-04-07

## 2018-04-07 RX ORDER — CIPROFLOXACIN 250 MG/1
500 TABLET, FILM COATED ORAL EVERY 12 HOURS
Qty: 20 TAB | Refills: 0 | Status: SHIPPED | OUTPATIENT
Start: 2018-04-07 | End: 2018-04-19

## 2018-04-07 RX ORDER — METRONIDAZOLE 500 MG/1
500 TABLET ORAL 3 TIMES DAILY
Qty: 30 TAB | Refills: 0 | Status: SHIPPED | OUTPATIENT
Start: 2018-04-07 | End: 2018-04-19

## 2018-04-07 RX ADMIN — LISINOPRIL 20 MG: 20 TABLET ORAL at 08:55

## 2018-04-07 RX ADMIN — OXYCODONE HYDROCHLORIDE AND ACETAMINOPHEN 1 TABLET: 5; 325 TABLET ORAL at 12:55

## 2018-04-07 RX ADMIN — OXYCODONE HYDROCHLORIDE AND ACETAMINOPHEN 1 TABLET: 5; 325 TABLET ORAL at 03:22

## 2018-04-07 RX ADMIN — OXYCODONE HYDROCHLORIDE AND ACETAMINOPHEN 1 TABLET: 5; 325 TABLET ORAL at 08:55

## 2018-04-07 RX ADMIN — PIPERACILLIN SODIUM,TAZOBACTAM SODIUM 3.38 G: 3; .375 INJECTION, POWDER, FOR SOLUTION INTRAVENOUS at 06:22

## 2018-04-07 RX ADMIN — PIPERACILLIN SODIUM,TAZOBACTAM SODIUM 3.38 G: 3; .375 INJECTION, POWDER, FOR SOLUTION INTRAVENOUS at 00:17

## 2018-04-07 RX ADMIN — GABAPENTIN 300 MG: 300 CAPSULE ORAL at 06:22

## 2018-04-07 RX ADMIN — HYDROCHLOROTHIAZIDE 25 MG: 25 TABLET ORAL at 08:55

## 2018-04-07 RX ADMIN — PIPERACILLIN SODIUM,TAZOBACTAM SODIUM 3.38 G: 3; .375 INJECTION, POWDER, FOR SOLUTION INTRAVENOUS at 12:55

## 2018-04-07 NOTE — ROUTINE PROCESS
Bedside and Verbal shift change report given to Lavinia Garcia RN (oncoming nurse) by Albin Brownlee RN (offgoing nurse). Report included the following information SBAR, Intake/Output and MAR.

## 2018-04-07 NOTE — PROGRESS NOTES
Problem: Falls - Risk of  Goal: *Absence of Falls  Document Bossman Fall Risk and appropriate interventions in the flowsheet.    Outcome: Progressing Towards Goal  Fall Risk Interventions:            Medication Interventions: Patient to call before getting OOB, Teach patient to arise slowly

## 2018-04-07 NOTE — PROGRESS NOTES
Shift summary:     Patient cleared for discharge. Pt. Provided with d/c education, review of medications, and follow-up appointment. All questions/concerns addressed.      Patient Vitals for the past 12 hrs:   Temp Pulse Resp BP SpO2   04/07/18 1219 97.7 °F (36.5 °C) 61 16 136/68 100 %   04/07/18 1020 - - - - 98 %   04/07/18 0810 98.3 °F (36.8 °C) (!) 55 16 123/67 98 %   04/07/18 0342 98.7 °F (37.1 °C) 63 16 128/63 96 %

## 2018-04-07 NOTE — PROGRESS NOTES
Bem Ra[address] 36. care of patient at this time. Patient is alert and oriented x 4. Patient denies chest pain, shortness of breath, or numbness or tingling in the upper or lower extremities. 20g IV in the right antecubital space is capped with no signs of infiltration or phlebitis. .  Patient currently experiencing 6/10 pain. Bed is in lowest position with the wheels locked. Siderails x 3 are up. Call bell within reach. Patient is currently resting in bed in no apparent distress. Will continue to monitor. 2010 - Patient ambulated to the restroom and voided clear, yellow urine. Patient then returned to bed. 2020 - Head to toe assessment performed at this time. Patient has no complaints of chest pain or shortness of breath. Denies any numbness or tingling in extremities. Lungs are clear to auscultation. Bowel sounds are present in all 4 quadrants, but are hyperactive. 20 g IV in the right AC flushed and is patent. Patient educated how to  actively manage their pain. Patient encouraged to use the incentive spirometer. Patient educated on the side effects of medications. Explained the importance of ambulation. Patient verbalized understanding. Patient left in bed with call light within reach, bed in lowest position with wheels locked, and siderails x 3 up. Will continue to monitor. 2023 - Patient stated pain to be 7/10. Patient received PRN pain medication per the [facility]. Patient educated on the side effects of the medications. Patient encouraged to continue to actively manage pain and call for assistance. Will continue to monitor. 1095 - Patient stated pain to be 6/10. Patient received PRN pain medication per the [facility]. Patient educated on the side effects of the medications. Patient encouraged to continue to actively manage pain and call for assistance. Will continue to monitor.

## 2018-04-07 NOTE — PROGRESS NOTES
Problem: Falls - Risk of  Goal: *Absence of Falls  Document Bossman Fall Risk and appropriate interventions in the flowsheet.    Outcome: Progressing Towards Goal  Fall Risk Interventions:            Medication Interventions: Teach patient to arise slowly

## 2018-04-07 NOTE — DISCHARGE INSTRUCTIONS
Diverticulitis: Care Instructions  Your Care Instructions    Diverticulitis occurs when pouches form in the wall of the colon and become inflamed or infected. It can be very painful. Doctors aren't sure what causes diverticulitis. There is no proof that foods such as nuts, seeds, or berries cause it or make it worse. A low-fiber diet may cause the colon to work harder to push stool forward. Pouches may form because of this extra work. It may be hard to think about healthy eating while you're in pain. But as you recover, you might think about how you can use healthy eating for overall better health. Healthy eating may help you avoid future attacks. Follow-up care is a key part of your treatment and safety. Be sure to make and go to all appointments, and call your doctor if you are having problems. It's also a good idea to know your test results and keep a list of the medicines you take. How can you care for yourself at home? · Drink plenty of fluids, enough so that your urine is light yellow or clear like water. If you have kidney, heart, or liver disease and have to limit fluids, talk with your doctor before you increase the amount of fluids you drink. · Stick to liquids or a bland diet (plain rice, bananas, dry toast or crackers, applesauce) until you are feeling better. Then you can return to regular foods and gradually increase the amount of fiber in your diet. · Use a heating pad set on low on your belly to relieve mild cramps and pain. · Get extra rest until you are feeling better. · Be safe with medicines. Read and follow all instructions on the label. ¨ If the doctor gave you a prescription medicine for pain, take it as prescribed. ¨ If you are not taking a prescription pain medicine, ask your doctor if you can take an over-the-counter medicine. · If your doctor prescribed antibiotics, take them as directed. Do not stop taking them just because you feel better.  You need to take the full course of antibiotics. To prevent future attacks of diverticulitis  · Avoid constipation:  ¨ Include fruits, vegetables, beans, and whole grains in your diet each day. These foods are high in fiber. ¨ Drink plenty of fluids, enough so that your urine is light yellow or clear like water. If you have kidney, heart, or liver disease and have to limit fluids, talk with your doctor before you increase the amount of fluids you drink. ¨ Get some exercise every day. Build up slowly to 30 to 60 minutes a day on 5 or more days of the week. ¨ Take a fiber supplement, such as Citrucel or Metamucil, every day if needed. Read and follow all instructions on the label. ¨ Schedule time each day for a bowel movement. Having a daily routine may help. Take your time and do not strain when having a bowel movement. When should you call for help? Call your doctor now or seek immediate medical care if:  ? · You have a fever. ? · You are vomiting. ? · You have new or worse belly pain. ? · You cannot pass stools or gas. ? Watch closely for changes in your health, and be sure to contact your doctor if you have any problems. Where can you learn more? Go to http://selena-lamonte.info/. Enter H901 in the search box to learn more about \"Diverticulitis: Care Instructions. \"  Current as of: May 12, 2017  Content Version: 11.4  © 6766-4950 [employer]. Care instructions adapted under license by [employer] (which disclaims liability or warranty for this information). If you have questions about a medical condition or this instruction, always ask your healthcare professional. [place] [address] any warranty or liability for your use of this information.

## 2018-04-12 ENCOUNTER — APPOINTMENT (OUTPATIENT)
Dept: GENERAL RADIOLOGY | Age: 53
DRG: 330 | End: 2018-04-12
Attending: PHYSICIAN ASSISTANT
Payer: MEDICARE

## 2018-04-12 ENCOUNTER — APPOINTMENT (OUTPATIENT)
Dept: CT IMAGING | Age: 53
DRG: 330 | End: 2018-04-12
Attending: PHYSICIAN ASSISTANT
Payer: MEDICARE

## 2018-04-12 ENCOUNTER — HOSPITAL ENCOUNTER (INPATIENT)
Age: 53
LOS: 7 days | Discharge: HOME HEALTH | DRG: 330 | End: 2018-04-19
Attending: EMERGENCY MEDICINE | Admitting: HOSPITALIST
Payer: MEDICARE

## 2018-04-12 DIAGNOSIS — K57.20 DIVERTICULITIS OF LARGE INTESTINE WITH ABSCESS WITHOUT BLEEDING: Primary | ICD-10-CM

## 2018-04-12 DIAGNOSIS — R19.7 DIARRHEA, UNSPECIFIED TYPE: ICD-10-CM

## 2018-04-12 PROBLEM — K57.32 DIVERTICULITIS OF SIGMOID COLON: Status: ACTIVE | Noted: 2018-04-12

## 2018-04-12 LAB
ALBUMIN SERPL-MCNC: 3.1 G/DL (ref 3.4–5)
ALBUMIN/GLOB SERPL: 0.5 {RATIO} (ref 0.8–1.7)
ALP SERPL-CCNC: 78 U/L (ref 45–117)
ALT SERPL-CCNC: 21 U/L (ref 13–56)
ANION GAP SERPL CALC-SCNC: 12 MMOL/L (ref 3–18)
APPEARANCE UR: CLEAR
AST SERPL-CCNC: 31 U/L (ref 15–37)
BACTERIA URNS QL MICRO: 0 /HPF
BASOPHILS # BLD: 0 K/UL (ref 0–0.06)
BASOPHILS NFR BLD: 0 % (ref 0–2)
BILIRUB SERPL-MCNC: 0.4 MG/DL (ref 0.2–1)
BILIRUB UR QL: NEGATIVE
BUN SERPL-MCNC: 11 MG/DL (ref 7–18)
BUN/CREAT SERPL: 13 (ref 12–20)
CALCIUM SERPL-MCNC: 9.5 MG/DL (ref 8.5–10.1)
CHLORIDE SERPL-SCNC: 104 MMOL/L (ref 100–108)
CO2 SERPL-SCNC: 26 MMOL/L (ref 21–32)
COLOR UR: YELLOW
CREAT SERPL-MCNC: 0.88 MG/DL (ref 0.6–1.3)
DIFFERENTIAL METHOD BLD: ABNORMAL
EOSINOPHIL # BLD: 0 K/UL (ref 0–0.4)
EOSINOPHIL NFR BLD: 0 % (ref 0–5)
EPITH CASTS URNS QL MICRO: ABNORMAL /LPF (ref 0–5)
ERYTHROCYTE [DISTWIDTH] IN BLOOD BY AUTOMATED COUNT: 13.5 % (ref 11.6–14.5)
GLOBULIN SER CALC-MCNC: 6.3 G/DL (ref 2–4)
GLUCOSE SERPL-MCNC: 109 MG/DL (ref 74–99)
GLUCOSE UR STRIP.AUTO-MCNC: NEGATIVE MG/DL
HCT VFR BLD AUTO: 40.6 % (ref 35–45)
HGB BLD-MCNC: 13.6 G/DL (ref 12–16)
HGB UR QL STRIP: NEGATIVE
KETONES UR QL STRIP.AUTO: NEGATIVE MG/DL
LACTATE SERPL-SCNC: 1.1 MMOL/L (ref 0.4–2)
LEUKOCYTE ESTERASE UR QL STRIP.AUTO: ABNORMAL
LYMPHOCYTES # BLD: 1.2 K/UL (ref 0.9–3.6)
LYMPHOCYTES NFR BLD: 11 % (ref 21–52)
MCH RBC QN AUTO: 28.3 PG (ref 24–34)
MCHC RBC AUTO-ENTMCNC: 33.5 G/DL (ref 31–37)
MCV RBC AUTO: 84.6 FL (ref 74–97)
MONOCYTES # BLD: 0.5 K/UL (ref 0.05–1.2)
MONOCYTES NFR BLD: 5 % (ref 3–10)
NEUTS SEG # BLD: 9.4 K/UL (ref 1.8–8)
NEUTS SEG NFR BLD: 84 % (ref 40–73)
NITRITE UR QL STRIP.AUTO: NEGATIVE
PH UR STRIP: 6.5 [PH] (ref 5–8)
PLATELET # BLD AUTO: 160 K/UL (ref 135–420)
PMV BLD AUTO: 9.4 FL (ref 9.2–11.8)
POTASSIUM SERPL-SCNC: 3.6 MMOL/L (ref 3.5–5.5)
PROT SERPL-MCNC: 9.4 G/DL (ref 6.4–8.2)
PROT UR STRIP-MCNC: NEGATIVE MG/DL
RBC # BLD AUTO: 4.8 M/UL (ref 4.2–5.3)
RBC #/AREA URNS HPF: ABNORMAL /HPF (ref 0–5)
SODIUM SERPL-SCNC: 142 MMOL/L (ref 136–145)
SP GR UR REFRACTOMETRY: >1.03 (ref 1–1.03)
UROBILINOGEN UR QL STRIP.AUTO: 0.2 EU/DL (ref 0.2–1)
WBC # BLD AUTO: 11.1 K/UL (ref 4.6–13.2)
WBC URNS QL MICRO: ABNORMAL /HPF (ref 0–5)

## 2018-04-12 PROCEDURE — 74011000258 HC RX REV CODE- 258: Performed by: PHYSICIAN ASSISTANT

## 2018-04-12 PROCEDURE — 87040 BLOOD CULTURE FOR BACTERIA: CPT | Performed by: PHYSICIAN ASSISTANT

## 2018-04-12 PROCEDURE — 99284 EMERGENCY DEPT VISIT MOD MDM: CPT

## 2018-04-12 PROCEDURE — 87046 STOOL CULTR AEROBIC BACT EA: CPT | Performed by: PHYSICIAN ASSISTANT

## 2018-04-12 PROCEDURE — 80053 COMPREHEN METABOLIC PANEL: CPT | Performed by: PHYSICIAN ASSISTANT

## 2018-04-12 PROCEDURE — 65270000029 HC RM PRIVATE

## 2018-04-12 PROCEDURE — 96374 THER/PROPH/DIAG INJ IV PUSH: CPT

## 2018-04-12 PROCEDURE — 83605 ASSAY OF LACTIC ACID: CPT | Performed by: PHYSICIAN ASSISTANT

## 2018-04-12 PROCEDURE — 74011000250 HC RX REV CODE- 250: Performed by: HOSPITALIST

## 2018-04-12 PROCEDURE — 87493 C DIFF AMPLIFIED PROBE: CPT | Performed by: PHYSICIAN ASSISTANT

## 2018-04-12 PROCEDURE — 74177 CT ABD & PELVIS W/CONTRAST: CPT

## 2018-04-12 PROCEDURE — 74011250637 HC RX REV CODE- 250/637: Performed by: HOSPITALIST

## 2018-04-12 PROCEDURE — 85025 COMPLETE CBC W/AUTO DIFF WBC: CPT | Performed by: PHYSICIAN ASSISTANT

## 2018-04-12 PROCEDURE — 71045 X-RAY EXAM CHEST 1 VIEW: CPT

## 2018-04-12 PROCEDURE — 74011250636 HC RX REV CODE- 250/636: Performed by: PHYSICIAN ASSISTANT

## 2018-04-12 PROCEDURE — 74011000258 HC RX REV CODE- 258: Performed by: HOSPITALIST

## 2018-04-12 PROCEDURE — 81001 URINALYSIS AUTO W/SCOPE: CPT | Performed by: PHYSICIAN ASSISTANT

## 2018-04-12 PROCEDURE — 96375 TX/PRO/DX INJ NEW DRUG ADDON: CPT

## 2018-04-12 PROCEDURE — 74011636320 HC RX REV CODE- 636/320: Performed by: EMERGENCY MEDICINE

## 2018-04-12 PROCEDURE — 74011250636 HC RX REV CODE- 250/636: Performed by: HOSPITALIST

## 2018-04-12 RX ORDER — SODIUM CHLORIDE 9 MG/ML
75 INJECTION, SOLUTION INTRAVENOUS CONTINUOUS
Status: DISCONTINUED | OUTPATIENT
Start: 2018-04-12 | End: 2018-04-17

## 2018-04-12 RX ORDER — MORPHINE SULFATE 4 MG/ML
2 INJECTION INTRAVENOUS
Status: COMPLETED | OUTPATIENT
Start: 2018-04-12 | End: 2018-04-12

## 2018-04-12 RX ORDER — ONDANSETRON 2 MG/ML
4 INJECTION INTRAMUSCULAR; INTRAVENOUS
Status: DISCONTINUED | OUTPATIENT
Start: 2018-04-12 | End: 2018-04-19 | Stop reason: HOSPADM

## 2018-04-12 RX ORDER — ONDANSETRON 2 MG/ML
4 INJECTION INTRAMUSCULAR; INTRAVENOUS
Status: COMPLETED | OUTPATIENT
Start: 2018-04-12 | End: 2018-04-12

## 2018-04-12 RX ORDER — METRONIDAZOLE 500 MG/100ML
500 INJECTION, SOLUTION INTRAVENOUS EVERY 6 HOURS
Status: DISCONTINUED | OUTPATIENT
Start: 2018-04-12 | End: 2018-04-16

## 2018-04-12 RX ORDER — MORPHINE SULFATE 4 MG/ML
2 INJECTION INTRAVENOUS
Status: DISCONTINUED | OUTPATIENT
Start: 2018-04-12 | End: 2018-04-13

## 2018-04-12 RX ORDER — ACETAMINOPHEN 325 MG/1
650 TABLET ORAL
Status: DISCONTINUED | OUTPATIENT
Start: 2018-04-12 | End: 2018-04-19 | Stop reason: HOSPADM

## 2018-04-12 RX ADMIN — PIPERACILLIN SODIUM,TAZOBACTAM SODIUM 3.38 G: 3; .375 INJECTION, POWDER, FOR SOLUTION INTRAVENOUS at 20:56

## 2018-04-12 RX ADMIN — MORPHINE SULFATE 2 MG: 4 INJECTION INTRAVENOUS at 12:21

## 2018-04-12 RX ADMIN — IOPAMIDOL 100 ML: 612 INJECTION, SOLUTION INTRAVENOUS at 12:34

## 2018-04-12 RX ADMIN — ONDANSETRON 4 MG: 2 INJECTION INTRAMUSCULAR; INTRAVENOUS at 12:21

## 2018-04-12 RX ADMIN — ACETAMINOPHEN 650 MG: 325 TABLET ORAL at 16:38

## 2018-04-12 RX ADMIN — SODIUM CHLORIDE 1710 ML: 900 INJECTION, SOLUTION INTRAVENOUS at 12:22

## 2018-04-12 RX ADMIN — MORPHINE SULFATE 2 MG: 4 INJECTION INTRAVENOUS at 14:39

## 2018-04-12 RX ADMIN — MORPHINE SULFATE 2 MG: 4 INJECTION INTRAVENOUS at 20:56

## 2018-04-12 RX ADMIN — PIPERACILLIN SODIUM,TAZOBACTAM SODIUM 3.38 G: 3; .375 INJECTION, POWDER, FOR SOLUTION INTRAVENOUS at 15:44

## 2018-04-12 RX ADMIN — METRONIDAZOLE 500 MG: 500 INJECTION, SOLUTION INTRAVENOUS at 21:30

## 2018-04-12 RX ADMIN — METRONIDAZOLE 500 MG: 500 INJECTION, SOLUTION INTRAVENOUS at 14:39

## 2018-04-12 RX ADMIN — SODIUM CHLORIDE 100 ML/HR: 900 INJECTION, SOLUTION INTRAVENOUS at 16:41

## 2018-04-12 NOTE — ED TRIAGE NOTES
Triage: pt dc'd from [facility] 5 days ago for diverticulitis. Pt states that starting yesterday she began to develop abdominal cramping, vomiting, and diarrhea. Sepsis Screening completed    (  )Patient meets SIRS criteria. (x  )Patient does not meet SIRS criteria.       SIRS Criteria is achieved when two or more of the following are present   Temperature < 96.8°F (36°C) or > 100.9°F (38.3°C)   Heart Rate > 90 beats per minute   Respiratory Rate > 20 breaths per minute   WBC count > 12,000 or <4,000 or > 10% bands

## 2018-04-12 NOTE — PROGRESS NOTES
Chart reviewed noted pt recently discharged from [facility] 4-3-18 for diverticulitis,cm will cont to review and assist if needed for prevention of readmission if possible.

## 2018-04-12 NOTE — ED PROVIDER NOTES
[address]  EMERGENCY DEPARTMENT HISTORY AND PHYSICAL EXAM    Date: 4/12/2018  Patient Name: Lisseth Santos  YOB: 1965  Medical Record Number: [id number]     History of Presenting Illness     Chief Complaint   Patient presents with    Abdominal Pain    Vomiting    Diarrhea       History Provided By: Patient    Chief Complaint: Abdominal pain  Duration: 1 Days  Timing:  Constant  Location: LLQ  Quality: Cramping  Severity: 10 out of 10  Modifying Factors: No relieving or worsening factors    Associated Symptoms: Fever (100.3 F in ED) and N/V/D    Additional History (Context):   11:41 AM  Lisseth Santos is a 46 y.o. female with PMHX HTN, WPW, GERD, arthritis, & hepatitis C, who presents to the emergency department C/O LLQ abdominal cramping onset yesterday. Associated symptoms include fever (100.3 F in ED) and N/V/D. Notes 10 episodes of diarrhea described as watery. Pt was seen by [facility] 9 days ago for abdominal pain without fever, had CT A/P done showing sigmoid diverticulitis with microperforation, was given Zosyn in ER, and was admitted to Shahriar Stone MD (general surgery). Pt was discharge 5 days ago, had felt completely better, was give rx Flagyl, and told to f/u with Bhavin Anderson DO (colorectal surgery). Reports pain feels the same as when she was admitted for the diverticulitis. Pt denies hematochezia, melena, dysuria, urinary frequency, urinary urgency, hematuria, urine decreased, difficulty urinating, chills, and any other Sx or complaints.       Shx: +0.25 PPD tobacco use, +social EtOH use, -illicit drug use    PCP: Kristina Giles MD    Current Facility-Administered Medications   Medication Dose Route Frequency Provider Last Rate Last Dose    metroNIDAZOLE (FLAGYL) IVPB premix 500 mg  500 mg IntraVENous Q6H Edith Patel MD   Stopped at 04/12/18 1538    piperacillin-tazobactam (ZOSYN) 3.375 g in 0.9% sodium chloride (MBP/ADV) 100 mL MBP  3.375 g IntraVENous Q6H Dipti Gruber MD        morphine 2 mg  2 mg IntraVENous Q4H PRN Dipti Gruber MD        ondansetron [facility]) injection 4 mg  4 mg IntraVENous Q6H PRN Dipti Gruber MD        acetaminophen (TYLENOL) tablet 650 mg  650 mg Oral Q6H PRN Dipti Gruber MD   650 mg at 04/12/18 1638    0.9% sodium chloride infusion  100 mL/hr IntraVENous CONTINUOUS Dipti Gruber  mL/hr at 04/12/18 1641 100 mL/hr at 04/12/18 1641     Current Outpatient Prescriptions   Medication Sig Dispense Refill    metroNIDAZOLE (FLAGYL) 500 mg tablet Take 1 Tab by mouth three (3) times daily. 30 Tab 0    ciprofloxacin HCl (CIPRO) 250 mg tablet Take 2 Tabs by mouth every twelve (12) hours. 20 Tab 0    gabapentin (NEURONTIN) 300 mg capsule Take 300 mg by mouth every morning.  lisinopril (PRINIVIL, ZESTRIL) 20 mg tablet Take  by mouth daily.  verapamil SR (CALAN-SR) 180 mg CR tablet Take 180 mg by mouth nightly.  hydrochlorothiazide (HYDRODIURIL) 25 mg tablet Take 25 mg by mouth daily. Past History     Past Medical History:  Past Medical History:   Diagnosis Date    Arthritis     GERD (gastroesophageal reflux disease)     Hepatitis C     Hypertension     SVT (supraventricular tachycardia) (HCC)     WPW (Nahomy-Parkinson-White syndrome)        Past Surgical History:  Past Surgical History:   Procedure Laterality Date    HX KNEE ARTHROSCOPY Right     HX OTHER SURGICAL  1997    Hx knee surgery R knee    HX SVT ABLATION         Family History:  History reviewed. No pertinent family history. Social History:  Social History   Substance Use Topics    Smoking status: Current Some Day Smoker     Packs/day: 0.25     Years: 17.00     Types: Cigarettes    Smokeless tobacco: Never Used    Alcohol use 3.6 oz/week     6 Cans of beer, 0 Standard drinks or equivalent per week      Comment: socially       Allergies:  No Known Allergies      Review of Systems     Review of Systems   Constitutional: Positive for fever.  Negative for chills. Gastrointestinal: Positive for abdominal pain, diarrhea, nausea and vomiting. Genitourinary: Negative for decreased urine volume, difficulty urinating, dysuria, frequency, hematuria and urgency. All other systems reviewed and are negative. Physical Exam     Vitals:    04/12/18 1430 04/12/18 1444 04/12/18 1500 04/12/18 1637   BP: 160/89 166/86 (!) 157/92 148/87   Pulse:  92  88   Resp:  16  17   Temp:    (!) 101.5 °F (38.6 °C)   SpO2: 100% 100% 99% 98%   Weight:       Height:         Physical Exam   Nursing note and vitals reviewed. Vital signs and nursing notes reviewed. CONSTITUTIONAL: Alert. Well-appearing; well-nourished; moderate pain distress. HEAD: Normocephalic; atraumatic. EYES: PERRL; Conjunctiva clear. ENT: Moist mucus membranes. NECK: Supple; FROM without difficulty, non-tender; no cervical lymphadenopathy. CV: Normal S1, S2; no murmurs, rubs, or gallops. No chest wall tenderness. RESPIRATORY: Normal chest excursion with respiration; breath sounds clear and equal bilaterally; no wheezes, rhonchi, or rales. GI: Normal bowel sounds; non-distended; generalized abdominal tenderness worse in LLQ; no guarding or rigidity; no palpable organomegaly. No CVA tenderness. BACK:  No evidence of trauma or deformity. Non-tender to palpation. EXT: Normal ROM in all four extremities; non-tender to palpation. SKIN: Normal for age and race; warm; dry; good turgor; no apparent lesions or exudate. NEURO: A & O x3. PSYCH:  Mood and affect appropriate.         Diagnostic Study Results     Labs -     Recent Results (from the past 12 hour(s))   CBC WITH AUTOMATED DIFF    Collection Time: 04/12/18 12:05 PM   Result Value Ref Range    WBC 11.1 4.6 - 13.2 K/uL    RBC 4.80 4.20 - 5.30 M/uL    HGB 13.6 12.0 - 16.0 g/dL    HCT 40.6 35.0 - 45.0 %    MCV 84.6 74.0 - 97.0 FL    MCH 28.3 24.0 - 34.0 PG    MCHC 33.5 31.0 - 37.0 g/dL    RDW 13.5 11.6 - 14.5 %    PLATELET 912 645 - 645 K/uL    MPV 9.4 9.2 - 11.8 FL    NEUTROPHILS 84 (H) 40 - 73 %    LYMPHOCYTES 11 (L) 21 - 52 %    MONOCYTES 5 3 - 10 %    EOSINOPHILS 0 0 - 5 %    BASOPHILS 0 0 - 2 %    ABS. NEUTROPHILS 9.4 (H) 1.8 - 8.0 K/UL    ABS. LYMPHOCYTES 1.2 0.9 - 3.6 K/UL    ABS. MONOCYTES 0.5 0.05 - 1.2 K/UL    ABS. EOSINOPHILS 0.0 0.0 - 0.4 K/UL    ABS. BASOPHILS 0.0 0.0 - 0.06 K/UL    DF AUTOMATED     METABOLIC PANEL, COMPREHENSIVE    Collection Time: 04/12/18 12:05 PM   Result Value Ref Range    Sodium 142 136 - 145 mmol/L    Potassium 3.6 3.5 - 5.5 mmol/L    Chloride 104 100 - 108 mmol/L    CO2 26 21 - 32 mmol/L    Anion gap 12 3.0 - 18 mmol/L    Glucose 109 (H) 74 - 99 mg/dL    BUN 11 7.0 - 18 MG/DL    Creatinine 0.88 0.6 - 1.3 MG/DL    BUN/Creatinine ratio 13 12 - 20      GFR est AA >60 >60 ml/min/1.73m2    GFR est non-AA >60 >60 ml/min/1.73m2    Calcium 9.5 8.5 - 10.1 MG/DL    Bilirubin, total 0.4 0.2 - 1.0 MG/DL    ALT (SGPT) 21 13 - 56 U/L    AST (SGOT) 31 15 - 37 U/L    Alk.  phosphatase 78 45 - 117 U/L    Protein, total 9.4 (H) 6.4 - 8.2 g/dL    Albumin 3.1 (L) 3.4 - 5.0 g/dL    Globulin 6.3 (H) 2.0 - 4.0 g/dL    A-G Ratio 0.5 (L) 0.8 - 1.7     LACTIC ACID    Collection Time: 04/12/18 12:15 PM   Result Value Ref Range    Lactic acid 1.1 0.4 - 2.0 MMOL/L   URINALYSIS W/ RFLX MICROSCOPIC    Collection Time: 04/12/18  1:15 PM   Result Value Ref Range    Color YELLOW      Appearance CLEAR      Specific gravity >1.030 (H) 1.005 - 1.030    pH (UA) 6.5 5.0 - 8.0      Protein NEGATIVE  NEG mg/dL    Glucose NEGATIVE  NEG mg/dL    Ketone NEGATIVE  NEG mg/dL    Bilirubin NEGATIVE  NEG      Blood NEGATIVE  NEG      Urobilinogen 0.2 0.2 - 1.0 EU/dL    Nitrites NEGATIVE  NEG      Leukocyte Esterase TRACE (A) NEG     URINE MICROSCOPIC ONLY    Collection Time: 04/12/18  1:15 PM   Result Value Ref Range    WBC 0 to 3 0 - 5 /hpf    RBC 0 to 3 0 - 5 /hpf    Epithelial cells FEW 0 - 5 /lpf    Bacteria 0 (A) NEG /hpf       Radiologic Studies -   CXR Results  (Last 48 hours)               04/12/18 1321  XR CHEST PORT Final result    Impression:  IMPRESSION:       1. No acute cardiopulmonary process. Narrative:  EXAM:Chest X-Ray         History: Sepsis, abdominal pain/cramping with vomiting and diarrhea       Technique:  Portable Frontal View       Comparison: 04/03/2018       _______________       FINDINGS:       The trachea is midline. The cardiomediastinal silhouette is midline and, within normal limits. The lungs are grossly clear without evidence of focal consolidation, effusion,   or pneumothorax. Intact osseous structures. _______________                 CT Results  (Last 48 hours)               04/12/18 1246  CT ABD PELV W CONT Final result    Impression:  IMPRESSION:           1. Findings of a short to moderate length segment of acute sigmoid   diverticulitis with left hemipelvic 2.1 cm abscess containing a single locule of   dependent air. Increased pericolonic and pelvic Edema and the left hemipelvic   rim-enhancing abscess collection, new since prior exam. This collection may   represent sequela of diverticulitis from 04/03/2018, to include gas forming   abscess and potentially contained perforation. Small volume of nonorganized   pelvic free fluid. - Discussed with [employer] of the ED at [phone] on 04/12/18, with   confirmatory verbal response. 2. Findings suggestive of nonmechanical small bowel ileus. 3. Hepatic cirrhotic morphology suggestive of hepatic cirrhosis with recanalized   umbilical vein. Narrative:  EXAM: CT of the Abdomen and Pelvis       INDICATION: Left lower quadrant abdominal pain. COMPARISON: 03/12/2013, 0403. TECHNIQUE: Axial CT imaging of the abdomen and pelvis was performed with   intravenous contrast. Multiplanar reformats were generated. Dose reduction   techniques used:  Automated exposure control, adjustment of the mAs and/or kVp   according to patient's size, and iterative reconstruction techniques. _______________       FINDINGS:       LOWER CHEST: Unremarkable. LIVER, BILIARY: Mild intrahepatic contour with recanalized umbilical vein. No   suspicious hepatic mass or lesion on this single phase exam. No biliary   dilation. Gallbladder is unremarkable. PANCREAS: Normal.       SPLEEN: Normal.       ADRENALS: Normal.       KIDNEYS: The kidneys are iso-attenuating without evidence of hydronephrosis,   nephrolithiasis or masses. No perinephric fluid collections. No hydroureter. LYMPH NODES: No enlarged lymph nodes. GASTROINTESTINAL TRACT: Moderate length segment of edematous thick-walled   sigmoid colon with left anterior pelvic 2.2 x 2.1 cm rim-enhancing collection   containing a single locule of air. Additional findings of a small volume of   nonorganized pelvic free fluid. Extensive diverticulosis predominantly involving the left and sigmoid segment   with no evidence of upstream dilatation. Abdominal air-filled prominent small bowel loops measure 3 cm with no focal   transition, likely ileus relating to 3. Diverticulitis. Normal appendix       PELVIC ORGANS: Small amount of nonorganized pelvic free fluid. Unremarkable   bladder. VASCULATURE: Atherosclerotic fracture calcification. Round ovoid IVC. BONES: No acute or aggressive osseous abnormalities identified. Degenerative   endplate sclerosis and stable irregularity, similar prior exam       OTHER: No pneumoperitoneum.        _______________                 Medications Given in the ED:  Medications   metroNIDAZOLE (FLAGYL) IVPB premix 500 mg (0 mg IntraVENous IV Completed 4/12/18 1538)   piperacillin-tazobactam (ZOSYN) 3.375 g in 0.9% sodium chloride (MBP/ADV) 100 mL MBP (not administered)   morphine 2 mg (not administered)   ondansetron (ZOFRAN) injection 4 mg (not administered)   acetaminophen (TYLENOL) tablet 650 mg (650 mg Oral Given 4/12/18 1638)   0.9% sodium chloride infusion (100 mL/hr IntraVENous New Bag 4/12/18 1641)   ondansetron (ZOFRAN) injection 4 mg (4 mg IntraVENous Given 4/12/18 1221)   morphine 2 mg (2 mg IntraVENous Given 4/12/18 1221)   sodium chloride 0.9 % bolus infusion 1,710 mL (0 mL/kg × 57 kg (Ideal) IntraVENous IV Completed 4/12/18 1430)   iopamidol (ISOVUE 300) 61 % contrast injection 100 mL (100 mL IntraVENous Given 4/12/18 1234)   piperacillin-tazobactam (ZOSYN) 3.375 g in 0.9% sodium chloride (MBP/ADV) 100 mL MBP (0 g IntraVENous IV Completed 4/12/18 1624)   morphine 2 mg (2 mg IntraVENous Given 4/12/18 1439)        Medical Decision Making     I am the first provider for this patient. I reviewed the vital signs, available nursing notes, past medical history, past surgical history, family history and social history. Records Reviewed: Nursing Notes, Old Medical Records, Previous Radiology Studies and Previous Laboratory Studies    Vital Signs-Reviewed the patient's vital signs. Patient Vitals for the past 12 hrs:   Temp Pulse Resp BP SpO2   04/12/18 1637 (!) 101.5 °F (38.6 °C) 88 17 148/87 98 %   04/12/18 1500 - - - (!) 157/92 99 %   04/12/18 1444 - 92 16 166/86 100 %   04/12/18 1430 - - - 160/89 100 %   04/12/18 1409 - 92 18 162/86 100 %   04/12/18 1326 - 89 16 150/73 100 %   04/12/18 1224 - 87 15 141/89 100 %   04/12/18 1131 100.3 °F (37.9 °C) 94 20 (!) 163/93 99 %         Pulse Oximetry Analysis - Normal 99% on RA        Procedures:  Procedures     ED Course:   11:41 AM Initial assessment performed. Pt and/or pt's family are aware of the plan of care and are in agreement. 1:15 PM Dr. Ganesh Landaverde (Radiologist), called regarding CT results consistent with diverticulitis with a small sigmoid rim enhancing fluid collection. No free air.    1:31 PM Consult Note: Discussed patient's history, exam, and available diagnostics results over the telephone with Sheila Due.  Dick Dillon MD, general surgeon, who states that he would like for her to be admitted to medicine for IV abx and he will contact Dheeraj Sims MD as he was the surgeon who d/c her on Saturday regarding further management/consult. 1:54 PM Consult Note: Discussed patient's history, exam, and available diagnostics results over the telephone with Dheeraj Sims MD, general surgeon, who is familiar with pt. Recommends admitting to medicine for IV abx, he will consult. If she requires surgical intervention, they will likely involve Judah Kennedy DO (colorectal surgeon) for laparoscopic colon  resection. 1:56 PM Kwasi Elmore MD (General Surgery) called back stating he spoke to Dheeraj Sims MD and agreed on what is noted above. 1:57 PM Clarke Lorenz MD, ED Attending, made aware of pt. Agrees with plan for admission. 1:59 PM Consult Note: Discussed patient's history, exam, and available diagnostics results over the telephone with Kirsten Granda MD, internal medicine, who agrees to admit pt to Medical.      Diagnosis and Disposition       Core Measures:  For Hospitalized Patients:    1. Hospitalization Decision Time:  The decision to hospitalize the patient was made by Jourdan Hendrix PA-C and Dheeraj Sims MD at 1:54 PM on 4/12/2018    2. Aspirin: Aspirin was not given because the patient did not present with a stroke at the time of their Emergency Department evaluation    3. Plan: Admit to Medical    2:00 PM Patient is being admitted to the hospital by Kirsten Granda MD. The results of their tests and reasons for their admission have been discussed with them and/or available family. They convey agreement and understanding for the need to be admitted and for their admission diagnosis. Conditions on Admission:  Sepsis is not present at the time of admission. Deep Vein Thrombosis is not present at the time of admission. Thrombosis is not present at the time of admission. Urinary Tract Infection is not present at the time of admission.  Pneumonia is not present at the time of admission. MRSA is not present at the time of admission. Wound infection is not present at the time of admission. Pressure Ulcer is not present at the time of admission. Clinical Impression:    1. Diverticulitis of large intestine with abscess without bleeding    2. Diarrhea, unspecified type        _______________________________    Attestations: This note is prepared by Nemo Nuñez, acting as Scribe for Charlott Gitelman, PA-C. Charlott Gitelman, PA-C:  The scribe's documentation has been prepared under my direction and personally reviewed by me in its entirety.   I confirm that the note above accurately reflects all work, treatment, procedures, and medical decision making performed by me.  _______________________________

## 2018-04-12 NOTE — IP AVS SNAPSHOT
[address] [address] 
[phone] Patient: Antonio Bravo MRN: [id number] BRT:0/65/9720 About your hospitalization You were admitted on:  April 12, 2018 You last received care in the:  [address] [place] [address] You were discharged on:  April 19, 2018 Why you were hospitalized Your primary diagnosis was:  Diverticulitis Of Sigmoid Colon Your diagnoses also included:  Perforation Of Sigmoid Colon Due To Diverticulitis, Chronic Hepatitis C (Hcc), C. Difficile Colitis, Hypokalemia Follow-up Information Follow up With Details Comments Contact Info Millie Cavrajal MD On 4/27/2018 Follow up appointment scheduled for April 27, 2018 at 10:20 a.m. 1113 [facility] [address] [facility] 150 
[phone] Charlotte Archer DO On 4/26/2018 Follow up appointment scheduled for April 26, 2018 at 9:45 a.m. Please arrive at 9:15 a.m. for check in. 711 [facility] 108 [facility] 150 
[phone] 3[address] [facility] 1 to continue managing your healthcare needs 6439 [facility] to [facility] 
[phone] Charlotte Archer DO Schedule an appointment as soon as possible for a visit For wound re-check [address] [place] [address] 108 [facility] 150 
[phone] Discharge Orders None A check armando indicates which time of day the medication should be taken. My Medications START taking these medications Instructions Each Dose to Equal  
 Morning Noon Evening Bedtime  
 fluconazole 100 mg tablet Commonly known as:  DIFLUCAN Start taking on:  4/20/2018 Your last dose was: Your next dose is: Take 1 Tab by mouth daily for 3 days. FDA advises cautious prescribing of oral fluconazole in pregnancy. 100 mg  
    
   
   
   
  
 nystatin powder Commonly known as:  MYCOSTATIN Your last dose was: Your next dose is:    
   
   
 Apply  to affected area two (2) times a day. Apply to vaginal area BID  
     
   
   
   
  
 oxyCODONE-acetaminophen 5-325 mg per tablet Commonly known as:  PERCOCET Your last dose was: Your next dose is: Take 1 Tab by mouth every four (4) hours as needed. Max Daily Amount: 6 Tabs. 1 Tab  
    
   
   
   
  
 potassium chloride 20 mEq tablet Commonly known as:  K-DUR, KLOR-CON Your last dose was: Your next dose is: Take 2 Tabs by mouth daily. 40 mEq  
    
   
   
   
  
 vancomycin 50 mg/mL oral solution (compounded) Your last dose was: Your next dose is: Take 5 mL by mouth every six (6) hours for 7 days. 250 mg CONTINUE taking these medications Instructions Each Dose to Equal  
 Morning Noon Evening Bedtime  
 gabapentin 300 mg capsule Commonly known as:  NEURONTIN Your last dose was: Your next dose is: Take 300 mg by mouth every morning. 300 mg  
    
   
   
   
  
 lisinopril 20 mg tablet Commonly known as:  Naida Andrey Your last dose was: Your next dose is: Take  by mouth daily. metroNIDAZOLE 500 mg tablet Commonly known as:  FLAGYL Your last dose was: Your next dose is: Take 1 Tab by mouth three (3) times daily for 7 days. 500 mg  
    
   
   
   
  
 verapamil  mg CR tablet Commonly known as:  CALAN-SR Your last dose was: Your next dose is: Take 180 mg by mouth nightly. 180 mg  
    
   
   
   
  
  
STOP taking these medications   
 ciprofloxacin HCl 250 mg tablet Commonly known as:  CIPRO hydroCHLOROthiazide 25 mg tablet Commonly known as:  HYDRODIURIL Where to Get Your Medications These medications were sent to [facility] [address] [facility] [address] AT [address]  200 Cristian Galarza, [address] [address] Hours:  24-hours Phone:  [phone]  
  fluconazole 100 mg tablet  
 metroNIDAZOLE 500 mg tablet  
 nystatin powder  
 potassium chloride 20 mEq tablet Information on where to get these meds will be given to you by the nurse or doctor. ! Ask your nurse or doctor about these medications  
  oxyCODONE-acetaminophen 5-325 mg per tablet  
 vancomycin 50 mg/mL oral solution (compounded) Opioid Education Prescription Opioids: What You Need to Know: 
 
 
Acute Diagnoses: 
Diverticulitis of sigmoid colon 
ruptured diverticulum Porforated diverticulum Chronic Medical Diagnoses: 
Problem List as of 4/19/2018  Date Reviewed: 4/14/2018 Codes Class Noted - Resolved C. difficile colitis ICD-10-CM: A04.72 
ICD-9-CM: 008.45  4/13/2018 - Present Hypokalemia ICD-10-CM: E87.6 ICD-9-CM: 276.8  4/13/2018 - Present * (Principal)Diverticulitis of sigmoid colon ICD-10-CM: K57.32 
ICD-9-CM: 562.11  4/12/2018 - Present Perforation of sigmoid colon due to diverticulitis ICD-10-CM: K57.20 ICD-9-CM: 562.11  4/12/2018 - Present Acute diverticulitis ICD-10-CM: M58.44 
ICD-9-CM: 562.11  4/3/2018 - Present Cirrhosis ([address] ICD-10-CM: K74.60 ICD-9-CM: 571.5  2/29/2016 - Present Chronic hepatitis C ([facility] [address] ICD-10-CM: B18.2 ICD-9-CM: 070.54  2015 - Present Cervical spondylarthritis ICD-10-CM: T13.652 ICD-9-CM: 721.0  2015 - Present Carpal tunnel syndrome ICD-10-CM: G56.00 
ICD-9-CM: 354.0  2015 - Present Hypertension ICD-10-CM: I10 
ICD-9-CM: 401.9  2015 - Present H/O arthroscopic knee surgery ICD-10-CM: Z98.890 ICD-9-CM: V45.89  2015 - Present WPW (Nahomy-Parkinson-White syndrome) ICD-10-CM: I45.6 ICD-9-CM: 426.7  2015 - Present Overview Signed 2015 10:59 AM by Carolin Rodriguez MD  
  Treated with cardiac ablation Diet 1. Resume prior to surgery diet as tolerated. Activity 1. Do not drive a car or operate any hazardous machinery the day of surgery. 2. Rest quietly today. 3. No bending or heavy lifting. 4. You may resume other prior to surgery activities as tolerated. 5. You may remove the bandage and shower in 1 day. Drain / Wound Care 1. Follow all drain / wound care instructions exactly as explained by the Nurse at time of discharge. 2. Apply an ice pack to the surgical site for 48 hours. 3. Do not put any salves or ointments on the wound. Allow it to form a dry scab. 4. Leave steri-strips / Dermabond alone. They should be allowed to fall off on their own in 7-14 days. Medications 1. It is important to take your medications exactly as they are prescribed. 2. Keep your medication in the bottles provided by the pharmacist, and keep a list of the medication names, dosages, and times they should be taken in your wallet. Call 911 anytime you think you may need emergency care. For example, call if: 
· You passed out (lost consciousness). · You have severe trouble breathing. · You have sudden chest pain and shortness of breath. Notify your Surgeon for any of the followin. Fever, chills, nausea, vomiting, severe abdominal pain or bleeding. 2. If you experience any redness or discharge or sign of infection. 3. Persistent nausea lasting more than 24 hours. If you are unable to reach your Surgeon for any of the symptoms above, you should proceed directly to the nearest Emergency Department. Post-Operative Appointment Information Call Dr. Cesar Hong office tomorrow morning at [phone] to schedule a post-operative office visit in two (2) weeks. If any questions or concerns arise, call your Surgeon at [phone]. Introducing [facility] SERVICES! Tom Roberts introduces [employer] patient portal. Now you can access parts of your medical record, email your doctor's office, and request medication refills online. 1. In your internet browser, go to [email or web address]. [email or web address] 2. Click on the First Time User? Click Here link in the Sign In box. You will see the New Member Sign Up page. 3. Enter your Play[employer]z Access Code exactly as it appears below. You will not need to use this code after youve completed the sign-up process. If you do not sign up before the expiration date, you must request a new code. · [employer] Access Code: [email or web address] Expires: 6/10/2018  6:57 PM 
 
4. Enter the last four digits of your Social Security Number (xxxx) and Date of Birth (mm/dd/yyyy) as indicated and click Submit. You will be taken to the next sign-up page. 5. Create a Playcez ID. This will be your Playcez login ID and cannot be changed, so think of one that is secure and easy to remember. 6. Create a [employer] password. You can change your password at any time. 7. Enter your Password Reset Question and Answer. This can be used at a later time if you forget your password. 8. Enter your e-mail address. You will receive e-mail notification when new information is available in [address] 9. Click Sign Up. You can now view and download portions of your medical record. 10. Click the Download Summary menu link to download a portable copy of your medical information. If you have questions, please visit the Frequently Asked Questions section of the MyChart website. Remember, [employer] is NOT to be used for urgent needs. For medical emergencies, dial 911. Now available from your iPhone and Android! Introducing Keanu Troncoso As a [facility] patient, I wanted to make you aware of our electronic visit tool called Keanu Troncoso. [employer] allows you to connect within minutes with a medical provider 24 hours a day, seven days a week via a mobile device or tablet or logging into a secure website from your computer. You can access Keanu Troncoso from anywhere in the [place]. A virtual visit might be right for you when you have a simple condition and feel like you just dont want to get out of bed, or cant get away from work for an appointment, when your regular [facility] provider is not available (evenings, weekends or holidays), or when youre out of town and need minor care. Electronic visits cost only $49 and if the [employer] provider determines a prescription is needed to treat your condition, one can be electronically transmitted to a nearby pharmacy*. Please take a moment to enroll today if you have not already done so. The enrollment process is free and takes just a few minutes. To enroll, please download the [employer] rolan to your tablet or phone, or visit [email or web address] to enroll on your computer. And, as an [address] [place] [address] patient with a [employer] account, the results of your visits will be scanned into your electronic medical record and your primary care provider will be able to view the scanned results. We urge you to continue to see your regular [facility] provider for your ongoing medical care.   And while your primary care provider may not be the one available when you seek a Keanu Troncoso virtual visit, the peace of mind you get from getting a real diagnosis real time can be priceless. For more information on Keanu Troncoso, view our Frequently Asked Questions (FAQs) at [email or web address]. Sincerely, 
 
Kitty Martinez MD 
Chief Medical Officer 50Sumit Velasquez *:  certain medications cannot be prescribed via Keanu Troncoso Providers Seen During Your Hospitalization Provider Specialty Primary office phone Víctor Pike MD Emergency Medicine [phone] Varsha Pavon MD Family Practice [phone] Your Primary Care Physician (PCP) Primary Care Physician Office Phone Office Fax Marilee Distlesley [phone] You are allergic to the following No active allergies Recent Documentation Height Weight Breastfeeding? BMI OB Status Smoking Status 1.651 m 95.1 kg No 34.88 kg/m2 Menopause Current Some Day Smoker Emergency Contacts Name Discharge Info Relation Home Work Mobile [address] CAREGIVER [3] Son [22] [phone] Patient Belongings The following personal items are in your possession at time of discharge: 
  Dental Appliances: None  Visual Aid: None      Home Medications: None   Jewelry: Ring  Clothing: Footwear, Undergarments, Pants, Shirt, Socks, Hat, Other (comment) (scarf)    Other Valuables: Eyeglasses, Cell Phone, Other (comment), Purse (phone ,)  Personal Items Sent to Safe: none Please provide this summary of care documentation to your next provider. Signatures-by signing, you are acknowledging that this After Visit Summary has been reviewed with you and you have received a copy. Patient Signature:  ____________________________________________________________ Date:  ____________________________________________________________  
  
Fayrene Retort Provider Signature:  ____________________________________________________________ Date:  ____________________________________________________________

## 2018-04-12 NOTE — H&P
[facility]  HISTORY AND PHYSICAL      Bri Carroll  MR#: [id number]  : 1965  ACCOUNT #: [de-identified]   ADMIT DATE: 2018    ADMITTING DIAGNOSIS:  Diverticulitis, with microperforation of the colon. HISTORY OF PRESENT ILLNESS:  This 28-year-old  female was recently in the hospital for 4 days. She was last seen on  and discharged to home. She had been admitted for sigmoid diverticulitis with microperforation. She was treated with IV antibiotics and improved and was sent home in stable condition; however, she was taking oral antibiotics at home, and last night, she ate fried chicken leg, corn and fried rice, and developed abdominal pain, loose stools and diarrhea. Her pain did not improve by today, so she came back to the emergency room. She has had no further diarrhea today. She had a fever when she came into the hospital.  CT scan reflects again microperforation of the sigmoid colon with diverticulitis, so she is being admitted for further treatment and evaluation. Her pain is her lower left side primarily, that radiates around to the right side of her abdomen. PAST MEDICAL HISTORY:  Notable for hypertension. Also includes a history of Nahomy-Parkinson-White syndrome, SVT, hypertension, hepatitis C and GERD. SOCIAL HISTORY:  She smokes half pack of cigarettes a day. Drinks alcohol on the weekends. She denies any binge drinking. Denies illicit drug use. PRIMARY CARE PHYSICIAN:  Dr. Kimball Forward:  At home, she is on the following medications:  She was on Cipro, gabapentin, HCTZ/lisinopril, Flagyl and verapamil. PREVIOUS SURGERIES:  She has had a knee arthroscopy and SVT ablation. FAMILY HISTORY:  No history of colon cancer or colon disease. ALLERGIES:  NONE KNOWN. REVIEW OF SYSTEMS:    CONSTITUTIONAL:  She complains of fevers over the past 24 hours, but no chills.   GASTROINTESTINAL:  She has had generalized, left-sided primarily, abdominal pain with cramping, loose stools last night, an episode of nausea and vomiting yesterday as well. GENITOURINARY:  Decreased urine volume. No dysuria or hematuria. MUSCULOSKELETAL:  No myalgias or arthralgias. HEMATOLOGIC:  No bleeding or bruisability. PHYSICAL EXAMINATION:  GENERAL:  She is an awake and alert, in no acute distress 63-year-old nontoxic-appearing  female, pleasant, conversive, oriented x3. VITAL SIGNS:  T-max is 100.3, pulse 92, blood pressure 166/86, respiratory rate of 16, SaO2 100% on room air. NECK:  Supple, no thyromegaly or lymphadenopathy. HEENT:  Oropharynx is dry. No lesions. Sclerae anicteric. Conjunctivae pink. LUNGS:  Clear bilaterally. No rales, rhonchi or wheezes. CARDIAC:  Regular rate and rhythm. No murmur, rub or gallop. ABDOMEN:  Soft. She has diminished bowel sounds, but notable bowel sounds in 4 quadrants just minimal.  She has tenderness to the left lower quadrant. No rebound or guarding. No abdominal distention or hepatosplenomegaly. LOWER EXTREMITIES:  No clubbing, cyanosis or edema. Distal pulses are palpable. SKIN:  There is no rash on the skin. LABORATORY DATA:  Her white count is 11.1, hemoglobin and hematocrit 13.6 and 40.6, platelets are 014. Her chemistry is within normal range. LFTs unremarkable. Lactic acid was 1.1. DIAGNOSTIC DATA:  CT scan again completed today shows findings of a short to moderate length segment of acute sigmoid diverticulitis, with left hemipelvis 2.1 cm abscess containing a single locule of dependent air. This could be abscess or potentially contained perforation, and she has a nonmechanical small bowel ileus. There is hepatic cirrhotic morphology suggestive of hepatic cirrhosis. A chest x-ray showed no acute cardiopulmonary process. ASSESSMENT:  1. Sigmoid diverticulitis with microperforation. 2.  History of hepatitis C.  3.  Hypertension.     PLAN:  Admission to the medical floor. We will resume her home medications for blood pressure. In the interim, she will be on IV antibiotics for the diverticulitis with Zosyn and Flagyl. Colorectal Surgery consultation has been called. Continue IV fluids. Follow up a C. difficile test, although I do think she has C. difficile, she has not had any diarrhea today. We will get a stool culture, CBC and BMP tomorrow morning. General Surgery was consulted from the ER. They deferred to Colorectal Surgery and wanted the patient admitted to the medical service. We will put on SCDs for DVT prophylaxis. She will be on contact until we decide that she probably does not have C. difficile and then it can be discontinued. In the interim, she needs to continue on IV antibiotics, fluid hydration, pain medication as necessary and consult with Surgery whether she will need a drainage catheter placed, or whether she is a candidate for a colectomy at this point in time, at least partial colectomy.   Expect at least 3 days in the hospital.      Leonidas Kanner, MD RI/ENRIQUE  D: 04/12/2018 15:51     T: 04/12/2018 16:21  JOB #: [id number]

## 2018-04-12 NOTE — IP AVS SNAPSHOT
[address] [address] 
[phone] Patient: Cora Carpio MRN: [id number] QNZ:[birth year] A check armando indicates which time of day the medication should be taken. My Medications START taking these medications Instructions Each Dose to Equal  
 Morning Noon Evening Bedtime  
 fluconazole 100 mg tablet Commonly known as:  DIFLUCAN Start taking on:  4/20/2018 Your last dose was: Your next dose is: Take 1 Tab by mouth daily for 3 days. FDA advises cautious prescribing of oral fluconazole in pregnancy. 100 mg  
    
   
   
   
  
 nystatin powder Commonly known as:  MYCOSTATIN Your last dose was: Your next dose is:    
   
   
 Apply  to affected area two (2) times a day. Apply to vaginal area BID  
     
   
   
   
  
 oxyCODONE-acetaminophen 5-325 mg per tablet Commonly known as:  PERCOCET Your last dose was: Your next dose is: Take 1 Tab by mouth every four (4) hours as needed. Max Daily Amount: 6 Tabs. 1 Tab  
    
   
   
   
  
 potassium chloride 20 mEq tablet Commonly known as:  K-DUR, KLOR-CON Your last dose was: Your next dose is: Take 2 Tabs by mouth daily. 40 mEq  
    
   
   
   
  
 vancomycin 50 mg/mL oral solution (compounded) Your last dose was: Your next dose is: Take 5 mL by mouth every six (6) hours for 7 days. 250 mg CONTINUE taking these medications Instructions Each Dose to Equal  
 Morning Noon Evening Bedtime  
 gabapentin 300 mg capsule Commonly known as:  NEURONTIN Your last dose was: Your next dose is: Take 300 mg by mouth every morning. 300 mg  
    
   
   
   
  
 lisinopril 20 mg tablet Commonly known as:  Tildon Pancho Your last dose was: Your next dose is: Take  by mouth daily. metroNIDAZOLE 500 mg tablet Commonly known as:  FLAGYL Your last dose was: Your next dose is: Take 1 Tab by mouth three (3) times daily for 7 days. 500 mg  
    
   
   
   
  
 verapamil  mg CR tablet Commonly known as:  CALAN-SR Your last dose was: Your next dose is: Take 180 mg by mouth nightly. 180 mg  
    
   
   
   
  
  
STOP taking these medications   
 ciprofloxacin HCl 250 mg tablet Commonly known as:  CIPRO hydroCHLOROthiazide 25 mg tablet Commonly known as:  HYDRODIURIL Where to Get Your Medications These medications were sent to [facility], [address] AT [address] [place] [address]  200 [facility], [facility] [address] [address] Hours:  24-hours Phone:  [phone]  
  fluconazole 100 mg tablet  
 metroNIDAZOLE 500 mg tablet  
 nystatin powder  
 potassium chloride 20 mEq tablet Information on where to get these meds will be given to you by the nurse or doctor. ! Ask your nurse or doctor about these medications  
  oxyCODONE-acetaminophen 5-325 mg per tablet  
 vancomycin 50 mg/mL oral solution (compounded)

## 2018-04-12 NOTE — ED NOTES
TRANSFER - OUT REPORT:    Verbal report given to penny ely(name) on Ata Never  being transferred to [facility](unit) for routine progression of care       Report consisted of patients Situation, Background, Assessment and   Recommendations(SBAR). Information from the following report(s) SBAR, Kardex, ED Summary, Intake/Output, MAR and Recent Results was reviewed with the receiving nurse. Lines:   Peripheral IV 04/12/18 Left Antecubital (Active)   Site Assessment Clean, dry, & intact 4/12/2018 11:58 AM   Phlebitis Assessment 0 4/12/2018 11:58 AM   Infiltration Assessment 0 4/12/2018 11:58 AM   Dressing Status Clean, dry, & intact 4/12/2018 11:58 AM   Dressing Type Transparent 4/12/2018 11:58 AM        Opportunity for questions and clarification was provided.       Patient transported with:   [facility]

## 2018-04-13 PROBLEM — E87.6 HYPOKALEMIA: Status: ACTIVE | Noted: 2018-04-13

## 2018-04-13 PROBLEM — A04.72 C. DIFFICILE COLITIS: Status: ACTIVE | Noted: 2018-04-13

## 2018-04-13 LAB
ANION GAP SERPL CALC-SCNC: 8 MMOL/L (ref 3–18)
BACTERIA SPEC CULT: ABNORMAL
BUN SERPL-MCNC: 9 MG/DL (ref 7–18)
BUN/CREAT SERPL: 10 (ref 12–20)
CALCIUM SERPL-MCNC: 8.1 MG/DL (ref 8.5–10.1)
CHLORIDE SERPL-SCNC: 106 MMOL/L (ref 100–108)
CO2 SERPL-SCNC: 26 MMOL/L (ref 21–32)
CREAT SERPL-MCNC: 0.87 MG/DL (ref 0.6–1.3)
ERYTHROCYTE [DISTWIDTH] IN BLOOD BY AUTOMATED COUNT: 13.7 % (ref 11.6–14.5)
GLUCOSE SERPL-MCNC: 88 MG/DL (ref 74–99)
HCT VFR BLD AUTO: 35.1 % (ref 35–45)
HGB BLD-MCNC: 11.4 G/DL (ref 12–16)
MAGNESIUM SERPL-MCNC: 1.7 MG/DL (ref 1.6–2.6)
MCH RBC QN AUTO: 27.9 PG (ref 24–34)
MCHC RBC AUTO-ENTMCNC: 32.5 G/DL (ref 31–37)
MCV RBC AUTO: 85.8 FL (ref 74–97)
PLATELET # BLD AUTO: 126 K/UL (ref 135–420)
PMV BLD AUTO: 9.3 FL (ref 9.2–11.8)
POTASSIUM SERPL-SCNC: 3.4 MMOL/L (ref 3.5–5.5)
RBC # BLD AUTO: 4.09 M/UL (ref 4.2–5.3)
SERVICE CMNT-IMP: ABNORMAL
SODIUM SERPL-SCNC: 140 MMOL/L (ref 136–145)
WBC # BLD AUTO: 7.9 K/UL (ref 4.6–13.2)

## 2018-04-13 PROCEDURE — 80048 BASIC METABOLIC PNL TOTAL CA: CPT | Performed by: HOSPITALIST

## 2018-04-13 PROCEDURE — 74011250637 HC RX REV CODE- 250/637: Performed by: HOSPITALIST

## 2018-04-13 PROCEDURE — 74011250636 HC RX REV CODE- 250/636: Performed by: HOSPITALIST

## 2018-04-13 PROCEDURE — 65270000029 HC RM PRIVATE

## 2018-04-13 PROCEDURE — 74011000258 HC RX REV CODE- 258: Performed by: HOSPITALIST

## 2018-04-13 PROCEDURE — 83735 ASSAY OF MAGNESIUM: CPT | Performed by: HOSPITALIST

## 2018-04-13 PROCEDURE — 77030032490 HC SLV COMPR SCD KNE COVD -B

## 2018-04-13 PROCEDURE — 85027 COMPLETE CBC AUTOMATED: CPT | Performed by: HOSPITALIST

## 2018-04-13 PROCEDURE — 36415 COLL VENOUS BLD VENIPUNCTURE: CPT | Performed by: HOSPITALIST

## 2018-04-13 PROCEDURE — 74011000250 HC RX REV CODE- 250: Performed by: HOSPITALIST

## 2018-04-13 PROCEDURE — 74011250637 HC RX REV CODE- 250/637: Performed by: SURGERY

## 2018-04-13 RX ORDER — OXYCODONE AND ACETAMINOPHEN 5; 325 MG/1; MG/1
2 TABLET ORAL
Status: DISCONTINUED | OUTPATIENT
Start: 2018-04-13 | End: 2018-04-19 | Stop reason: HOSPADM

## 2018-04-13 RX ORDER — LISINOPRIL 20 MG/1
20 TABLET ORAL DAILY
Status: DISCONTINUED | OUTPATIENT
Start: 2018-04-13 | End: 2018-04-19 | Stop reason: HOSPADM

## 2018-04-13 RX ORDER — ACETAMINOPHEN 650 MG/1
650 SUPPOSITORY RECTAL
Status: DISCONTINUED | OUTPATIENT
Start: 2018-04-13 | End: 2018-04-16

## 2018-04-13 RX ORDER — POTASSIUM CHLORIDE 20 MEQ/1
40 TABLET, EXTENDED RELEASE ORAL
Status: COMPLETED | OUTPATIENT
Start: 2018-04-13 | End: 2018-04-13

## 2018-04-13 RX ORDER — VERAPAMIL HYDROCHLORIDE 180 MG/1
180 TABLET, EXTENDED RELEASE ORAL
Status: DISCONTINUED | OUTPATIENT
Start: 2018-04-13 | End: 2018-04-19 | Stop reason: HOSPADM

## 2018-04-13 RX ADMIN — SODIUM CHLORIDE 100 ML/HR: 900 INJECTION, SOLUTION INTRAVENOUS at 09:09

## 2018-04-13 RX ADMIN — OXYCODONE HYDROCHLORIDE AND ACETAMINOPHEN 2 TABLET: 5; 325 TABLET ORAL at 18:20

## 2018-04-13 RX ADMIN — METRONIDAZOLE 500 MG: 500 INJECTION, SOLUTION INTRAVENOUS at 16:52

## 2018-04-13 RX ADMIN — PIPERACILLIN SODIUM,TAZOBACTAM SODIUM 3.38 G: 3; .375 INJECTION, POWDER, FOR SOLUTION INTRAVENOUS at 21:16

## 2018-04-13 RX ADMIN — PIPERACILLIN SODIUM,TAZOBACTAM SODIUM 3.38 G: 3; .375 INJECTION, POWDER, FOR SOLUTION INTRAVENOUS at 02:15

## 2018-04-13 RX ADMIN — ACETAMINOPHEN 650 MG: 325 TABLET ORAL at 00:35

## 2018-04-13 RX ADMIN — POTASSIUM CHLORIDE 40 MEQ: 20 TABLET, EXTENDED RELEASE ORAL at 13:05

## 2018-04-13 RX ADMIN — VERAPAMIL HYDROCHLORIDE 180 MG: 180 TABLET, FILM COATED, EXTENDED RELEASE ORAL at 02:31

## 2018-04-13 RX ADMIN — METRONIDAZOLE 500 MG: 500 INJECTION, SOLUTION INTRAVENOUS at 10:19

## 2018-04-13 RX ADMIN — MORPHINE SULFATE 2 MG: 4 INJECTION INTRAVENOUS at 06:22

## 2018-04-13 RX ADMIN — PIPERACILLIN SODIUM,TAZOBACTAM SODIUM 3.38 G: 3; .375 INJECTION, POWDER, FOR SOLUTION INTRAVENOUS at 15:05

## 2018-04-13 RX ADMIN — METRONIDAZOLE 500 MG: 500 INJECTION, SOLUTION INTRAVENOUS at 02:50

## 2018-04-13 RX ADMIN — VERAPAMIL HYDROCHLORIDE 180 MG: 180 TABLET, FILM COATED, EXTENDED RELEASE ORAL at 23:24

## 2018-04-13 RX ADMIN — MORPHINE SULFATE 2 MG: 4 INJECTION INTRAVENOUS at 01:16

## 2018-04-13 RX ADMIN — METRONIDAZOLE 500 MG: 500 INJECTION, SOLUTION INTRAVENOUS at 21:16

## 2018-04-13 RX ADMIN — PIPERACILLIN SODIUM,TAZOBACTAM SODIUM 3.38 G: 3; .375 INJECTION, POWDER, FOR SOLUTION INTRAVENOUS at 09:08

## 2018-04-13 RX ADMIN — OXYCODONE HYDROCHLORIDE AND ACETAMINOPHEN 2 TABLET: 5; 325 TABLET ORAL at 13:05

## 2018-04-13 RX ADMIN — MORPHINE SULFATE 2 MG: 4 INJECTION INTRAVENOUS at 10:26

## 2018-04-13 RX ADMIN — LISINOPRIL 20 MG: 20 TABLET ORAL at 09:08

## 2018-04-13 NOTE — CONSULTS
Pt seen and evaluated. Agree with management for failed outpatient treatment. Will likely need operation this hospital visit. Discussed with pt. Will have further discussion when family joins us on Saturday. Full consult to follow. Will continue to monitor pt.     Sheila Escalante, DO  4/12/2018

## 2018-04-13 NOTE — PROGRESS NOTES
Hospitalist Progress Note-critical care note     Patient: Sancho Acosta MRN: [id number]  CSN: [id number]    YOB: 1965  Age: 46 y.o. Sex: female    DOA: 4/12/2018 LOS:  LOS: 1 day            Chief complaint: c diff , diverticulitis of sigmoid , hypokalemia     Assessment/Plan         Hospital Problems  Date Reviewed: 4/7/2018          Codes Class Noted POA    C. difficile colitis ICD-10-CM: A04.72  ICD-9-CM: 008.45  4/13/2018 Unknown        Hypokalemia ICD-10-CM: E87.6  ICD-9-CM: 276.8  4/13/2018 Unknown        * (Principal)Diverticulitis of sigmoid colon ICD-10-CM: K57.32  ICD-9-CM: 562.11  4/12/2018 Yes        Perforation of sigmoid colon due to diverticulitis ICD-10-CM: K57.20  ICD-9-CM: 562.11  4/12/2018 Yes        Chronic hepatitis C ([facility] ICD-10-CM: B18.2  ICD-9-CM: 070.54  9/16/2015 Yes          1. Sigmoid diverticulitis with microperforation./ c diff colitis   Will continue zosyn and flagyl    Will narrow abx after the surgery   Case discussed with dr. Robyn Prasad and plan to have surgery tomorrow. Will give clear diet now, npo after midnight    2. History of hepatitis C.  3.  Hypertension. Continue home meds, medication need to be released   4 hypokalemia   K replacement , will check mg     Subjective : diarrhea better, want to eat     Nurse: no acute issue   Disposition :3-4 days     Review of systems:    General: No fevers or chills. Cardiovascular: No chest pain or pressure. No palpitations. Pulmonary: No shortness of breath. Gastrointestinal: No nausea, vomiting.  Diarrhea x 3     Vital signs/Intake and Output:  Visit Vitals    /58 (BP 1 Location: Right arm, BP Patient Position: At rest)    Pulse (!) 51    Temp 98.9 °F (37.2 °C)    Resp 16    Ht 5' 5\" (1.651 m)    Wt 88.7 kg (195 lb 9.6 oz)    SpO2 97%    Breastfeeding No    BMI 32.55 kg/m2     Current Shift:  04/13 0701 - 04/13 1900  In: -   Out: 200 [Urine:200]  Last three shifts:  04/11 1901 - 04/13 0700  In: 1250 [I.V.:1250]  Out: 400 [Urine:400]    Physical Exam:  General: WD, WN. Alert, cooperative, no acute distress    HEENT: NC, Atraumatic. PERRLA, anicteric sclerae. Lungs: CTA Bilaterally. No Wheezing/Rhonchi/Rales. Heart:  Regular  rhythm,  No murmur, No Rubs, No Gallops  Abdomen: Soft, Non distended, Non tender.  +Bowel sounds,   Extremities: No c/c/e  Psych:   Not anxious or agitated. Neurologic:  No acute neurological deficit. Labs: Results:       Chemistry Recent Labs      04/13/18   0355  04/12/18   1205   GLU  88  109*   NA  140  142   K  3.4*  3.6   CL  106  104   CO2  26  26   BUN  9  11   CREA  0.87  0.88   CA  8.1*  9.5   AGAP  8  12   BUCR  10*  13   AP   --   78   TP   --   9.4*   ALB   --   3.1*   GLOB   --   6.3*   AGRAT   --   0.5*      CBC w/Diff Recent Labs      04/13/18   0355  04/12/18   1205   WBC  7.9  11.1   RBC  4.09*  4.80   HGB  11.4*  13.6   HCT  35.1  40.6   PLT  126*  160   GRANS   --   84*   LYMPH   --   11*   EOS   --   0      Cardiac Enzymes No results for input(s): CPK, CKND1, GHASSAN in the last 72 hours. No lab exists for component: CKRMB, TROIP   Coagulation No results for input(s): PTP, INR, APTT in the last 72 hours. No lab exists for component: INREXT    Lipid Panel No results found for: CHOL, CHOLPOCT, CHOLX, CHLST, CHOLV, 810991, HDL, LDL, LDLC, DLDLP, 303262, VLDLC, VLDL, TGLX, TRIGL, TRIGP, TGLPOCT, CHHD, CHHDX   BNP No results for input(s): BNPP in the last 72 hours.    Liver Enzymes Recent Labs      04/12/18   1205   TP  9.4*   ALB  3.1*   AP  78   SGOT  31      Thyroid Studies No results found for: T4, T3U, TSH, TSHEXT     Procedures/imaging: see electronic medical records for all procedures/Xrays and details which were not copied into this note but were reviewed prior to creation of Marily Mathias MD

## 2018-04-13 NOTE — PROGRESS NOTES
Dr. Stanton Sanches asked regarding diet, received order for NPO. Tylenol given for Temp of 101.1. Had temp of 101.5 earlier and pt had received tylenol prn for fever asb ordered. Blood and stool culture pending. Pt (+) for C. Diff, on contact isolation    0500 MD aware that pt had a fever and Tylenol given po. Pt up ad ann marie to bathroom, voiding in toilet, no BM reported. Abdomen soft, tender to touch.  Received order for Tylenol supp as needed

## 2018-04-13 NOTE — PROGRESS NOTES
Chart reviewed. Pt admitted for diverticulitis. It is noted pt was a recent admission. Per MD Benny Holder, pt will likely have surgical intervention. CM will follow for discharge planning needs. Readmission Risk Assessment:     High Risk and MSSP/[facility] A[facility] patients    RRAT Score:  21 or greater    Initial Assessment:  Per chart, pt is 79-year-old [facility] American female was recently in the hospital for 4 days. She was last seen on 04/07 and discharged to home. She had been admitted for sigmoid diverticulitis with microperforation. She was treated with IV antibiotics and improved and was sent home in stable condition; however, she was taking oral antibiotics at home, and last night, she ate fried chicken leg, corn and fried rice, and developed abdominal pain, loose stools and diarrhea. Her pain did not improve by today, so she came back to the emergency room. She has had no further diarrhea today. She had a fever when she came into the hospital.  CT scan reflects again microperforation of the sigmoid colon with diverticulitis, so she is being admitted for further treatment and evaluation. Her pain is her lower left side primarily, that radiates around to the right side of her abdomen. Emergency Contact:    Name Relation Home Work [id number] [place] [address] Son [phone]        Pertinent Medical Hx:    hypertension. Also includes a history of Nahomy-Parkinson-White syndrome, SVT, hypertension, hepatitis C and GERD    PCP/Specialists:   PCP MD Nam     Community Services:      DME:         High Risk Care Transition Plan:  1. Evaluate for [facility] or [facility], SNF, acute rehab, community care coordination of Resources. 2. Involve patient/caregiver in assessment, planning, education and implement of intervention. 3. CM daily patient care huddles/interdisciplinary rounds. 4. PCP/Specialist appointment within 48 hours of discharge unless otherwise specified.   5. Facilitate transportation and logistics for follow-up appointments. 6. Medication reconciliation [address] [facility]  7. Discharge follow-up phone call within 2  4 days (NN, [employer], Nursing) CM follow up as assigned. 8. Formal handoff between hospital provider and post-acute provider to transition patient  9. Handoff to [address] [place] [address] Nurse Navigator or PCP practice.         Care Management Interventions  Transition of Care Consult (CM Consult): Discharge Planning

## 2018-04-13 NOTE — ROUTINE PROCESS
1618 Bedside and Verbal shift change report given to Jose Francisco Alexandre (oncoming nurse) by AnalisaRN (offgoing nurse). Report included the following information SBAR, Kardex and MAR.

## 2018-04-13 NOTE — PROGRESS NOTES
INITIAL NUTRITION ASSESSMENT     RECOMMENDATIONS/PLAN:   Adv diet per MD  Avoid prolonged NPO diet order  [facility] Diet [facility] for diverticulitis-before d/c   Monitor labs/lytes, diet adv, skin integrity, wt, fluid status, BM      REASON FOR ASSESSMENT:     []  RN Referral:    [] MST score >/=2  Malnutrition Screening Tool (MST):  Recently Lost Weight Without Trying: Yes  If Yes, How Much Weight Loss: 2-13 lbs  Eating Poorly Due to Decreased Appetite: Yes  MST Score: 2      NUTRITION ASSESSMENT:   Client History: 46 yrs old Female admitted with Diverticulitis, with microperforation of the colon     PMHx: HTN, Nahomy-Parkinson-White syndrome, SVT, hypertension, hepatitis C and GERD. Cultural/Yarsani Food Preferences: None Identified    FOOD/NUTRITION HISTORY  Diet History: Per MD pt had she ate fried chicken leg, corn and fried rice before coming in and symptoms began to flare up again. Food Allergies:  [x] NKFA      Pertinent PTA Medications:  [x] Reviewed     NUTRITION INTAKE   Diet Order:  NPO      Average PO Intake:       No data found. Pertinent Medications:  [x] Reviewed; Electrolyte Replacement Protocol: []K  []Mg  []PO4    Insulin:  [] SSI  [] Pre-meal   []  Basal   [] Drip  [] None  Pt expected to meet estimated nutrient needs through next review:          []  Yes     [x] No; NPO does not meet estimated needs  ANTHROPOMETRICS  Height: 5' 5\" (165.1 cm)       Weight: 88.7 kg (195 lb 9.6 oz)    BMI: 32.5 kg/m^2  -  obese (30%-39.9% BMI)        Weight change: no significant wt loss noted in linnette hx                                   Comparison to Reference Standards:  IBW: 125 lbs      %IBW: 156%      AdjBW: 64.8 kg    NUTRITION-FOCUSED PHYSICAL ASSESSMENT  Skin: No PU.      GI: No BM    BIOCHEMICAL DATA & MEDICAL TESTS  Pertinent Labs:  [x] Reviewed; K-3.4 Ca-8.1     NUTRITION PRESCRIPTION  Calories: 2107 kcal/day based on [place] x 1.2 x 1.3  Protein:  g/day based on 1.0-1.2 g/kg  CHO: 263 g/day based on 50% of total energy  Fluid: 2107 ml/day based on 1 kcal/ml      NUTRITION DIAGNOSES:   1. Excessive fat intake related to poor diet compliance as evidence by MD note of eating fried chicken leg, corn and fried rice PTA. NUTRITION INTERVENTIONS:   INTERVENTIONS:        GOALS:  1. Edu:Diverticulitis diet edu before d/c 1. Adv diet per MD by next review 3 days   2. Other Adv diet pr MD      LEARNING NEEDS (Diet, Supplementation, Food/Nutrient-Drug Interaction):   [] None Identified  [] Inpatient education provided/documented    [x] Identified and patient:  [] Declined     [x] Was not appropriate/indicated- will need before d/c  NUTRITION MONITORING /EVALUATION:   Monitor wt  Monitor renal labs, electrolytes, fluid status    [] Participated in Interdisciplinary Rounds  [x] Interdisciplinary Care Plan Reviewed/Documented  DISCHARGE NUTRITION RECOMMENDATIONS ADDRESSED:      [x] To be determined closer to discharge    NUTRITION RISK:     [x]  At risk                     []  Not currently at risk     Will follow-up per policy.   Dalila Whittaker 1

## 2018-04-13 NOTE — PROGRESS NOTES
4300 Jose Jackson care of pt at this time. Pt in bed with no signs of distress. Pt left with call light within reach and encouraged to call for assistance. 1730 pt is sleeping, without any distress, no complain and  Concern at this time. 1821 Pt state pain as 8/10. Pt received medication as per MAR. Potential medication side effects explained to patient, patient verbalizes understanding, opportunities for questions provided. Patient stable, no apparent distress at this time, bed in locked position, call bell within reach.

## 2018-04-13 NOTE — ROUTINE PROCESS
Bedside shift change report given to PAULETTE gill Rn (oncoming nurse) by Uriah WORKMAN RN (offgoing nurse). Report included the following information SBAR, Kardex and MAR.

## 2018-04-13 NOTE — ROUTINE PROCESS
1940 TRANSFER - IN REPORT:    Verbal report received from Malcom Weems RN(name) on Maru Snow  being received from ER(unit) for routine progression of care      Report consisted of patients Situation, Background, Assessment and   Recommendations(SBAR). Information from the following report(s) SBAR, Kardex, ED Summary, Intake/Output, MAR and Recent Results was reviewed with the receiving nurse. Opportunity for questions and clarification was provided. Assessment completed upon patients arrival to unit and care assumed. Bedside and Verbal shift change report given to Asmita Lobo RN (oncoming nurse) by Gauri Robbins RN   (offgoing nurse). Report included the following information SBAR, Kardex, ED Summary, Intake/Output, MAR and Recent Results.

## 2018-04-13 NOTE — ROUTINE PROCESS
3685 assumed care of pt after bedside verbal report was given by off going nurse, pt awake in bed resting, no acute distress noted, normal saline infusing at 100 ml/hr, will monitor

## 2018-04-13 NOTE — PROGRESS NOTES
Reason for Admission:      Elective gastric surgery  RRAT Score:      Green/Low 0  Plan for utilizing home health:     N/A    Likelihood of Readmission:      Low   Transition of Care Plan:     Pt admitted for an elective surgical procedure. Pt is independent and his wife will assist upon discharge. Please encourage ambulation. No plan of care needs identified. Anticipate pt will be medically stable for discharge within the next 24-48 hours. CM available to assist as needed. Care Management Interventions  PCP Verified by CM: Yes  Mode of Transport at Discharge:  Other (see comment) (Spouse)  Transition of Care Consult (CM Consult): Discharge Planning  Health Maintenance Reviewed: Yes  Current Support Network: Lives with Spouse  Confirm Follow Up Transport: Self  Plan discussed with Pt/Family/Caregiver: Yes  Discharge Location  Discharge Placement: Home with family assistance

## 2018-04-14 ENCOUNTER — ANESTHESIA (OUTPATIENT)
Dept: SURGERY | Age: 53
DRG: 330 | End: 2018-04-14
Payer: MEDICARE

## 2018-04-14 ENCOUNTER — ANESTHESIA EVENT (OUTPATIENT)
Dept: SURGERY | Age: 53
DRG: 330 | End: 2018-04-14
Payer: MEDICARE

## 2018-04-14 LAB
BACTERIA SPEC CULT: ABNORMAL
MAGNESIUM SERPL-MCNC: 1.9 MG/DL (ref 1.6–2.6)
SERVICE CMNT-IMP: ABNORMAL

## 2018-04-14 PROCEDURE — 77030012407 HC DRN WND BARD -B: Performed by: SURGERY

## 2018-04-14 PROCEDURE — 76060000041 HC ANESTHESIA 5 TO 5.5 HR: Performed by: SURGERY

## 2018-04-14 PROCEDURE — C9290 INJ, BUPIVACAINE LIPOSOME: HCPCS | Performed by: SURGERY

## 2018-04-14 PROCEDURE — 76010000137 HC OR TIME 5 TO 5.5 HR: Performed by: SURGERY

## 2018-04-14 PROCEDURE — 74011000250 HC RX REV CODE- 250: Performed by: HOSPITALIST

## 2018-04-14 PROCEDURE — 88304 TISSUE EXAM BY PATHOLOGIST: CPT | Performed by: SURGERY

## 2018-04-14 PROCEDURE — 74011250637 HC RX REV CODE- 250/637: Performed by: SURGERY

## 2018-04-14 PROCEDURE — 74011000258 HC RX REV CODE- 258: Performed by: SURGERY

## 2018-04-14 PROCEDURE — 0DBN0ZZ EXCISION OF SIGMOID COLON, OPEN APPROACH: ICD-10-PCS | Performed by: SURGERY

## 2018-04-14 PROCEDURE — 74011000250 HC RX REV CODE- 250: Performed by: SURGERY

## 2018-04-14 PROCEDURE — 77030009962 HC RELD STPLR CR J&J -C: Performed by: SURGERY

## 2018-04-14 PROCEDURE — 74011250636 HC RX REV CODE- 250/636: Performed by: HOSPITALIST

## 2018-04-14 PROCEDURE — 36415 COLL VENOUS BLD VENIPUNCTURE: CPT | Performed by: HOSPITALIST

## 2018-04-14 PROCEDURE — 77030008683 HC TU ET CUF COVD -A: Performed by: ANESTHESIOLOGY

## 2018-04-14 PROCEDURE — 77030002986 HC SUT PROL J&J -A: Performed by: SURGERY

## 2018-04-14 PROCEDURE — 77030003029 HC SUT VCRL J&J -B: Performed by: SURGERY

## 2018-04-14 PROCEDURE — 77030036998 HC STPLR CRV CUT CNTOUR J&J -F: Performed by: SURGERY

## 2018-04-14 PROCEDURE — 77030002966 HC SUT PDS J&J -A: Performed by: SURGERY

## 2018-04-14 PROCEDURE — 74011250636 HC RX REV CODE- 250/636: Performed by: SURGERY

## 2018-04-14 PROCEDURE — 74011250636 HC RX REV CODE- 250/636

## 2018-04-14 PROCEDURE — 77030031139 HC SUT VCRL2 J&J -A: Performed by: SURGERY

## 2018-04-14 PROCEDURE — 77030026102 HC DEV TISS ENSEAL G2 J&J -F: Performed by: SURGERY

## 2018-04-14 PROCEDURE — 77030013079 HC BLNKT BAIR HGGR 3M -A: Performed by: ANESTHESIOLOGY

## 2018-04-14 PROCEDURE — 77030032490 HC SLV COMPR SCD KNE COVD -B: Performed by: SURGERY

## 2018-04-14 PROCEDURE — 77030008477 HC STYL SATN SLP COVD -A: Performed by: ANESTHESIOLOGY

## 2018-04-14 PROCEDURE — 74011000258 HC RX REV CODE- 258: Performed by: HOSPITALIST

## 2018-04-14 PROCEDURE — 76210000006 HC OR PH I REC 0.5 TO 1 HR: Performed by: SURGERY

## 2018-04-14 PROCEDURE — 77030034849: Performed by: SURGERY

## 2018-04-14 PROCEDURE — 74011000250 HC RX REV CODE- 250

## 2018-04-14 PROCEDURE — 77030014008 HC SPNG HEMSTAT J&J -C: Performed by: SURGERY

## 2018-04-14 PROCEDURE — 77030006643: Performed by: ANESTHESIOLOGY

## 2018-04-14 PROCEDURE — 77030011267 HC ELECTRD BLD COVD -A: Performed by: SURGERY

## 2018-04-14 PROCEDURE — 83735 ASSAY OF MAGNESIUM: CPT | Performed by: HOSPITALIST

## 2018-04-14 PROCEDURE — 88305 TISSUE EXAM BY PATHOLOGIST: CPT | Performed by: SURGERY

## 2018-04-14 PROCEDURE — 77030011264 HC ELECTRD BLD EXT COVD -A: Performed by: SURGERY

## 2018-04-14 PROCEDURE — 88307 TISSUE EXAM BY PATHOLOGIST: CPT | Performed by: SURGERY

## 2018-04-14 PROCEDURE — 77030025242: Performed by: SURGERY

## 2018-04-14 PROCEDURE — 65270000029 HC RM PRIVATE

## 2018-04-14 PROCEDURE — 77030013567 HC DRN WND RESERV BARD -A: Performed by: SURGERY

## 2018-04-14 PROCEDURE — 77030018836 HC SOL IRR NACL ICUM -A: Performed by: SURGERY

## 2018-04-14 PROCEDURE — 93005 ELECTROCARDIOGRAM TRACING: CPT

## 2018-04-14 PROCEDURE — 77030025303 HC STPLR ENDOSC J&J -G: Performed by: SURGERY

## 2018-04-14 RX ORDER — FENTANYL CITRATE 50 UG/ML
INJECTION, SOLUTION INTRAMUSCULAR; INTRAVENOUS AS NEEDED
Status: DISCONTINUED | OUTPATIENT
Start: 2018-04-14 | End: 2018-04-15 | Stop reason: HOSPADM

## 2018-04-14 RX ORDER — ROCURONIUM BROMIDE 10 MG/ML
INJECTION, SOLUTION INTRAVENOUS AS NEEDED
Status: DISCONTINUED | OUTPATIENT
Start: 2018-04-14 | End: 2018-04-15 | Stop reason: HOSPADM

## 2018-04-14 RX ORDER — DEXAMETHASONE SODIUM PHOSPHATE 4 MG/ML
INJECTION, SOLUTION INTRA-ARTICULAR; INTRALESIONAL; INTRAMUSCULAR; INTRAVENOUS; SOFT TISSUE AS NEEDED
Status: DISCONTINUED | OUTPATIENT
Start: 2018-04-14 | End: 2018-04-15 | Stop reason: HOSPADM

## 2018-04-14 RX ORDER — HYDROMORPHONE HYDROCHLORIDE 2 MG/ML
INJECTION, SOLUTION INTRAMUSCULAR; INTRAVENOUS; SUBCUTANEOUS AS NEEDED
Status: DISCONTINUED | OUTPATIENT
Start: 2018-04-14 | End: 2018-04-15 | Stop reason: HOSPADM

## 2018-04-14 RX ORDER — MIDAZOLAM HYDROCHLORIDE 1 MG/ML
INJECTION, SOLUTION INTRAMUSCULAR; INTRAVENOUS AS NEEDED
Status: DISCONTINUED | OUTPATIENT
Start: 2018-04-14 | End: 2018-04-15 | Stop reason: HOSPADM

## 2018-04-14 RX ORDER — MAGNESIUM CITRATE
296 SOLUTION, ORAL ORAL
Status: DISCONTINUED | OUTPATIENT
Start: 2018-04-14 | End: 2018-04-14

## 2018-04-14 RX ORDER — ONDANSETRON 2 MG/ML
INJECTION INTRAMUSCULAR; INTRAVENOUS AS NEEDED
Status: DISCONTINUED | OUTPATIENT
Start: 2018-04-14 | End: 2018-04-15 | Stop reason: HOSPADM

## 2018-04-14 RX ORDER — MAGNESIUM CITRATE
296 SOLUTION, ORAL ORAL
Status: COMPLETED | OUTPATIENT
Start: 2018-04-14 | End: 2018-04-14

## 2018-04-14 RX ORDER — LIDOCAINE HYDROCHLORIDE 20 MG/ML
INJECTION, SOLUTION EPIDURAL; INFILTRATION; INTRACAUDAL; PERINEURAL AS NEEDED
Status: DISCONTINUED | OUTPATIENT
Start: 2018-04-14 | End: 2018-04-15 | Stop reason: HOSPADM

## 2018-04-14 RX ORDER — MORPHINE SULFATE 4 MG/ML
2 INJECTION INTRAVENOUS
Status: DISCONTINUED | OUTPATIENT
Start: 2018-04-14 | End: 2018-04-15 | Stop reason: RX

## 2018-04-14 RX ORDER — PROPOFOL 10 MG/ML
INJECTION, EMULSION INTRAVENOUS AS NEEDED
Status: DISCONTINUED | OUTPATIENT
Start: 2018-04-14 | End: 2018-04-15 | Stop reason: HOSPADM

## 2018-04-14 RX ORDER — SODIUM CHLORIDE, SODIUM LACTATE, POTASSIUM CHLORIDE, CALCIUM CHLORIDE 600; 310; 30; 20 MG/100ML; MG/100ML; MG/100ML; MG/100ML
INJECTION, SOLUTION INTRAVENOUS
Status: DISCONTINUED | OUTPATIENT
Start: 2018-04-14 | End: 2018-04-15 | Stop reason: HOSPADM

## 2018-04-14 RX ADMIN — ONDANSETRON 4 MG: 2 INJECTION INTRAMUSCULAR; INTRAVENOUS at 20:52

## 2018-04-14 RX ADMIN — METRONIDAZOLE 500 MG: 500 INJECTION, SOLUTION INTRAVENOUS at 20:24

## 2018-04-14 RX ADMIN — METRONIDAZOLE 500 MG: 500 INJECTION, SOLUTION INTRAVENOUS at 08:38

## 2018-04-14 RX ADMIN — OXYCODONE HYDROCHLORIDE AND ACETAMINOPHEN 2 TABLET: 5; 325 TABLET ORAL at 00:35

## 2018-04-14 RX ADMIN — ROCURONIUM BROMIDE 10 MG: 10 INJECTION, SOLUTION INTRAVENOUS at 20:54

## 2018-04-14 RX ADMIN — ROCURONIUM BROMIDE 10 MG: 10 INJECTION, SOLUTION INTRAVENOUS at 21:27

## 2018-04-14 RX ADMIN — SODIUM CHLORIDE, SODIUM LACTATE, POTASSIUM CHLORIDE, CALCIUM CHLORIDE: 600; 310; 30; 20 INJECTION, SOLUTION INTRAVENOUS at 22:45

## 2018-04-14 RX ADMIN — METRONIDAZOLE 500 MG: 500 INJECTION, SOLUTION INTRAVENOUS at 03:27

## 2018-04-14 RX ADMIN — LIDOCAINE HYDROCHLORIDE 60 MG: 20 INJECTION, SOLUTION EPIDURAL; INFILTRATION; INTRACAUDAL; PERINEURAL at 20:06

## 2018-04-14 RX ADMIN — PROPOFOL 150 MG: 10 INJECTION, EMULSION INTRAVENOUS at 20:06

## 2018-04-14 RX ADMIN — PIPERACILLIN SODIUM,TAZOBACTAM SODIUM 3.38 G: 3; .375 INJECTION, POWDER, FOR SOLUTION INTRAVENOUS at 13:58

## 2018-04-14 RX ADMIN — HYDROMORPHONE HYDROCHLORIDE 0.4 MG: 2 INJECTION, SOLUTION INTRAMUSCULAR; INTRAVENOUS; SUBCUTANEOUS at 21:38

## 2018-04-14 RX ADMIN — CITROMA MAGNESIUM CITRATE 296 ML: 1.75 LIQUID ORAL at 14:07

## 2018-04-14 RX ADMIN — SODIUM CHLORIDE, SODIUM LACTATE, POTASSIUM CHLORIDE, CALCIUM CHLORIDE: 600; 310; 30; 20 INJECTION, SOLUTION INTRAVENOUS at 20:14

## 2018-04-14 RX ADMIN — SODIUM CHLORIDE, SODIUM LACTATE, POTASSIUM CHLORIDE, CALCIUM CHLORIDE: 600; 310; 30; 20 INJECTION, SOLUTION INTRAVENOUS at 21:33

## 2018-04-14 RX ADMIN — CITROMA MAGNESIUM CITRATE 296 ML: 1.75 LIQUID ORAL at 15:11

## 2018-04-14 RX ADMIN — HYDROMORPHONE HYDROCHLORIDE 0.4 MG: 2 INJECTION, SOLUTION INTRAMUSCULAR; INTRAVENOUS; SUBCUTANEOUS at 20:52

## 2018-04-14 RX ADMIN — MORPHINE SULFATE 2 MG: 4 INJECTION INTRAVENOUS at 15:07

## 2018-04-14 RX ADMIN — DEXAMETHASONE SODIUM PHOSPHATE 4 MG: 4 INJECTION, SOLUTION INTRA-ARTICULAR; INTRALESIONAL; INTRAMUSCULAR; INTRAVENOUS; SOFT TISSUE at 20:42

## 2018-04-14 RX ADMIN — PIPERACILLIN SODIUM,TAZOBACTAM SODIUM 3.38 G: 3; .375 INJECTION, POWDER, FOR SOLUTION INTRAVENOUS at 20:12

## 2018-04-14 RX ADMIN — HYDROMORPHONE HYDROCHLORIDE 0.4 MG: 2 INJECTION, SOLUTION INTRAMUSCULAR; INTRAVENOUS; SUBCUTANEOUS at 23:13

## 2018-04-14 RX ADMIN — PIPERACILLIN SODIUM,TAZOBACTAM SODIUM 3.38 G: 3; .375 INJECTION, POWDER, FOR SOLUTION INTRAVENOUS at 02:17

## 2018-04-14 RX ADMIN — MORPHINE SULFATE 2 MG: 4 INJECTION INTRAVENOUS at 17:47

## 2018-04-14 RX ADMIN — MORPHINE SULFATE 2 MG: 4 INJECTION INTRAVENOUS at 10:45

## 2018-04-14 RX ADMIN — SODIUM CHLORIDE 100 ML/HR: 900 INJECTION, SOLUTION INTRAVENOUS at 17:53

## 2018-04-14 RX ADMIN — MORPHINE SULFATE 2 MG: 4 INJECTION INTRAVENOUS at 13:00

## 2018-04-14 RX ADMIN — FENTANYL CITRATE 150 MCG: 50 INJECTION, SOLUTION INTRAMUSCULAR; INTRAVENOUS at 20:04

## 2018-04-14 RX ADMIN — METRONIDAZOLE 500 MG: 500 INJECTION, SOLUTION INTRAVENOUS at 14:34

## 2018-04-14 RX ADMIN — ONDANSETRON 4 MG: 2 INJECTION INTRAMUSCULAR; INTRAVENOUS at 08:44

## 2018-04-14 RX ADMIN — MIDAZOLAM HYDROCHLORIDE 2 MG: 1 INJECTION, SOLUTION INTRAMUSCULAR; INTRAVENOUS at 19:57

## 2018-04-14 RX ADMIN — PIPERACILLIN SODIUM,TAZOBACTAM SODIUM 3.38 G: 3; .375 INJECTION, POWDER, FOR SOLUTION INTRAVENOUS at 07:53

## 2018-04-14 RX ADMIN — SODIUM CHLORIDE 100 ML/HR: 900 INJECTION, SOLUTION INTRAVENOUS at 06:39

## 2018-04-14 RX ADMIN — ROCURONIUM BROMIDE 40 MG: 10 INJECTION, SOLUTION INTRAVENOUS at 20:06

## 2018-04-14 NOTE — ANESTHESIA PREPROCEDURE EVALUATION
Anesthetic History   No history of anesthetic complications            Review of Systems / Medical History  Patient summary reviewed, nursing notes reviewed and pertinent labs reviewed    Pulmonary          Smoker    Pertinent negatives: No COPD, asthma, recent URI and sleep apnea  Comments: Pt abstained from smoking for 3 days   Neuro/Psych   Within defined limits           Cardiovascular    Hypertension: well controlled        Dysrhythmias : SVT    Pertinent negatives: No past MI, CAD, PAD, angina and CHF  Exercise tolerance: >4 METS  Comments: H/o WPW s/p ablation   GI/Hepatic/Renal     GERD: well controlled  Hepatitis: type C    Liver disease  Pertinent negatives: No PUD and renal disease   Endo/Other        Obesity and arthritis  Pertinent negatives: No diabetes, hypothyroidism, hyperthyroidism, morbid obesity and blood dyscrasia   Other Findings              Physical Exam    Airway  Mallampati: III  TM Distance: 4 - 6 cm  Neck ROM: normal range of motion   Mouth opening: Normal     Cardiovascular  Regular rate and rhythm,  S1 and S2 normal,  no murmur, click, rub, or gallop             Dental      Comments: Missing many molars   Pulmonary  Breath sounds clear to auscultation               Abdominal  GI exam deferred       Other Findings            Anesthetic Plan    ASA: 3  Anesthesia type: general          Induction: Intravenous  Anesthetic plan and risks discussed with: Patient and Sibling      GA/EDIN

## 2018-04-14 NOTE — ROUTINE PROCESS
Bedside and Verbal shift change report given to FRANK Nash RN  (oncoming nurse) by  JOSIAH Lopez RN  (offgoing nurse). Report included the following information SBAR, Kardex, Recent Results and Med Rec Status.

## 2018-04-14 NOTE — PROGRESS NOTES
Surgery Progress Note    Patient: Aurelio Dent MRN: [id number]  CSN: [id number]    YOB: 1965  Age: 46 y.o. Sex: female    DOA: 4/12/2018 LOS:  LOS: 1 day          Chief Complaint:          Assessment/Plan     Pt with perforated diverticulitis failing outpt management and also now with C diff. Surgery is warranted but if stable would allow for treatment of c.diff. Discussed with patient.     Patient Active Problem List   Diagnosis Code    Chronic hepatitis C ([facility] B18.2    Cervical spondylarthritis M47.812    Carpal tunnel syndrome G56.00    Hypertension I10    H/O arthroscopic knee surgery Z98.890    WPW (Nahomy-Parkinson-White syndrome) I45.6    Cirrhosis (HCC) K74.60    Acute diverticulitis K57.92    Diverticulitis of sigmoid colon K57.32    Perforation of sigmoid colon due to diverticulitis K57.20    C. difficile colitis A04.72    Hypokalemia E87.6       Subjective:    Pain relatively controlled    Review of systems:    Constitutional: denies fevers, chills, myalgias  Respiratory: denies SOB, cough  Cardiovascular: denies chest pain, palpitations  Gastrointestinal: denies nausea, vomiting, diarrhea      Vital signs/Intake and Output:  Visit Vitals    /75 (BP 1 Location: Right arm, BP Patient Position: At rest)    Pulse 68    Temp 98.6 °F (37 °C)    Resp 18    Ht 5' 5\" (1.651 m)    Wt 90.4 kg (199 lb 3.2 oz)    SpO2 96%    Breastfeeding No    BMI 33.15 kg/m2     Current Shift:     Last three shifts:  04/12 0701 - 04/13 1900  In: 1250 [I.V.:1250]  Out: 600 [Urine:600]    Exam:    General: Well developed, alert, NAD, OX3  Head/Neck: NCAT, supple, No masses, No lymphadenopathy  CVS:Regular rate and rhythm, no M/R/G, S1/S2 heard, no thrill  Lungs:Clear to auscultation bilaterally, no wheezes, rhonchi, or rales  Abdomen: Soft, decreased tender, No distention, Normal Bowel sounds, No hepatomegaly  Extremities: No C/C/E, pulses palpable 2+  Skin:normal texture and turgor, no rashes, no lesions  Neuro:grossly normal , follows commands  Psych:appropriate                Labs: Results:       Chemistry Recent Labs      04/13/18   0355  04/12/18   1205   GLU  88  109*   NA  140  142   K  3.4*  3.6   CL  106  104   CO2  26  26   BUN  9  11   CREA  0.87  0.88   CA  8.1*  9.5   AGAP  8  12   BUCR  10*  13   AP   --   78   TP   --   9.4*   ALB   --   3.1*   GLOB   --   6.3*   AGRAT   --   0.5*      CBC w/Diff Recent Labs      04/13/18   0355  04/12/18   1205   WBC  7.9  11.1   RBC  4.09*  4.80   HGB  11.4*  13.6   HCT  35.1  40.6   PLT  126*  160   GRANS   --   84*   LYMPH   --   11*   EOS   --   0      Cardiac Enzymes No results for input(s): CPK, CKND1, GHASSAN in the last 72 hours. No lab exists for component: CKRMB, TROIP   Coagulation No results for input(s): PTP, INR, APTT in the last 72 hours. No lab exists for component: INREXT    Lipid Panel No results found for: CHOL, CHOLPOCT, CHOLX, CHLST, CHOLV, 632558, HDL, LDL, LDLC, DLDLP, 942515, VLDLC, VLDL, TGLX, TRIGL, TRIGP, TGLPOCT, CHHD, CHHDX   BNP No results for input(s): BNPP in the last 72 hours.    Liver Enzymes Recent Labs      04/12/18   1205   TP  9.4*   ALB  3.1*   AP  78   SGOT  31      Thyroid Studies No results found for: T4, T3U, TSH, TSHEXT     Procedures/imaging: see electronic medical records for all procedures/Xrays and details which were not copied into this note but were reviewed prior to creation of Plan      Charlotte Archer DO

## 2018-04-14 NOTE — INTERVAL H&P NOTE
H&P Update:  Cierra Carr was seen and examined. History and physical has been reviewed. The patient has been examined. There have been no significant clinical changes since the completion of the originally dated History and Physical.  Patient identified by surgeon; surgical site was confirmed by patient and surgeon. RBA discussed at length with pt, brother and 2 sisters (one is nurse). All questions answered.  Understand and agree with proceeding with Open LAR possible ostomy for perforated diverticulitis complicated by CDiff    Signed By: Vilma Vasquez DO     April 14, 2018 7:56 PM

## 2018-04-14 NOTE — PROGRESS NOTES
Shift Summary :  No significant events during the shift. Up at bedside as needed. For surgery on Sunday. NPO started with surgical preparation. Complained of pain x 1 with oral pain medication effective. Will need IV meds if needed night before surgery.

## 2018-04-14 NOTE — PROGRESS NOTES
Hospitalist Progress Note-critical care note     Patient: Doris Marquez MRN: [id number]  CSN: [id number]    YOB: 1965  Age: 46 y.o. Sex: female    DOA: 4/12/2018 LOS:  LOS: 2 days            Chief complaint: c diff , diverticulitis of sigmoid , hypokalemia     Assessment/Plan         Hospital Problems  Date Reviewed: 4/7/2018          Codes Class Noted POA    C. difficile colitis ICD-10-CM: A04.72  ICD-9-CM: 008.45  4/13/2018 Unknown        Hypokalemia ICD-10-CM: E87.6  ICD-9-CM: 276.8  4/13/2018 Unknown        * (Principal)Diverticulitis of sigmoid colon ICD-10-CM: K57.32  ICD-9-CM: 562.11  4/12/2018 Yes        Perforation of sigmoid colon due to diverticulitis ICD-10-CM: K57.20  ICD-9-CM: 562.11  4/12/2018 Yes        Chronic hepatitis C ([facility] ICD-10-CM: B18.2  ICD-9-CM: 070.54  9/16/2015 Yes            1.  Sigmoid diverticulitis with microperforation./ c diff colitis   Will continue zosyn and flagyl    Will narrow abx after the surgery   Case discussed with dr. Alexandria Ceron and plan to have surgery today       2. History of hepatitis C.  3.  Hypertension. Continue home meds,    4 hypokalemia   K replacement ,    Subjective : diarrhea     Nurse: no acute issue   Disposition :3-4 days     Review of systems:    General: No fevers or chills. Cardiovascular: No chest pain or pressure. No palpitations. Pulmonary: No shortness of breath. Gastrointestinal: No nausea, vomiting. Diarrhea ,abdomen pain     Vital signs/Intake and Output:  Visit Vitals    /80 (BP 1 Location: Right arm, BP Patient Position: At rest)    Pulse 71    Temp 98.3 °F (36.8 °C)    Resp 16    Ht 5' 5\" (1.651 m)    Wt 90.4 kg (199 lb 3.2 oz)    SpO2 97%    Breastfeeding No    BMI 33.15 kg/m2     Current Shift:     Last three shifts:  04/12 1901 - 04/14 0700  In: 3246 [I.V.:3246]  Out: 600 [Urine:600]    Physical Exam:  General: WD, WN. Alert, cooperative, no acute distress    HEENT: NC, Atraumatic.   PERRLA, anicteric sclerae. Lungs: CTA Bilaterally. No Wheezing/Rhonchi/Rales. Heart:  Regular  rhythm,  No murmur, No Rubs, No Gallops  Abdomen: Soft, no distended, mild  tender.  +Bowel sounds,   Extremities: No c/c/e  Psych:   Not anxious or agitated. Neurologic:  No acute neurological deficit. Labs: Results:       Chemistry Recent Labs      04/13/18   0355  04/12/18   1205   GLU  88  109*   NA  140  142   K  3.4*  3.6   CL  106  104   CO2  26  26   BUN  9  11   CREA  0.87  0.88   CA  8.1*  9.5   AGAP  8  12   BUCR  10*  13   AP   --   78   TP   --   9.4*   ALB   --   3.1*   GLOB   --   6.3*   AGRAT   --   0.5*      CBC w/Diff Recent Labs      04/13/18   0355  04/12/18   1205   WBC  7.9  11.1   RBC  4.09*  4.80   HGB  11.4*  13.6   HCT  35.1  40.6   PLT  126*  160   GRANS   --   84*   LYMPH   --   11*   EOS   --   0      Cardiac Enzymes No results for input(s): CPK, CKND1, GHASSAN in the last 72 hours. No lab exists for component: CKRMB, TROIP   Coagulation No results for input(s): PTP, INR, APTT in the last 72 hours. No lab exists for component: INREXT, INREXT    Lipid Panel No results found for: CHOL, CHOLPOCT, CHOLX, CHLST, CHOLV, 123250, HDL, LDL, LDLC, DLDLP, 439214, VLDLC, VLDL, TGLX, TRIGL, TRIGP, TGLPOCT, CHHD, CHHDX   BNP No results for input(s): BNPP in the last 72 hours.    Liver Enzymes Recent Labs      04/12/18   1205   TP  9.4*   ALB  3.1*   AP  78   SGOT  31      Thyroid Studies No results found for: T4, T3U, TSH, TSHEXT, TSHEXT     Procedures/imaging: see electronic medical records for all procedures/Xrays and details which were not copied into this note but were reviewed prior to creation of Nahomi Martines MD

## 2018-04-14 NOTE — PROGRESS NOTES
0745 - Bedside shift change report given to Rayna Tapia RN (oncoming nurse) by Rosita De La Fuente RN (offgoing nurse). Report included the following information SBAR, Kardex, ED Summary, Intake/Output, MAR and Recent Results. Summary: Pt relatively comfortable throughout shift. Was able to complete bowel prep. Pain well controlled by IV regimen; some n/v persistent throughout day. Patient Vitals for the past 12 hrs:   Temp Pulse Resp BP SpO2   04/14/18 1545 98.5 °F (36.9 °C) 69 16 158/86 98 %   04/14/18 1143 98.3 °F (36.8 °C) 71 16 159/80 97 %     1900 - Pt to OR. SBAR given at bedside. TRANSFER - OUT REPORT:    Verbal report given to Hoda Nuñez RN (name) on [facility]  being transferred to OR (unit) for ordered procedure       Report consisted of patients Situation, Background, Assessment and   Recommendations(SBAR). Information from the following report(s) SBAR, Kardex, ED Summary, Intake/Output, MAR, Recent Results and Cardiac Rhythm sinus sharron was reviewed with the receiving nurse. Lines:   Peripheral IV 04/13/18 Right Arm (Active)   Site Assessment Clean, dry, & intact 4/14/2018  3:07 PM   Phlebitis Assessment 0 4/14/2018  3:07 PM   Infiltration Assessment 0 4/14/2018  3:07 PM   Dressing Status Clean, dry, & intact 4/14/2018  3:07 PM   Dressing Type Transparent 4/14/2018  7:57 AM   Hub Color/Line Status Infusing;Patent 4/14/2018  3:07 PM   Action Taken Open ports on tubing capped 4/14/2018  7:57 AM   Alcohol Cap Used Yes 4/14/2018  3:07 PM       Peripheral IV 04/14/18 Left Forearm (Active)        Opportunity for questions and clarification was provided.

## 2018-04-14 NOTE — PROGRESS NOTES
Problem: Falls - Risk of  Goal: *Absence of Falls  Document Bossman Fall Risk and appropriate interventions in the flowsheet.    Outcome: Progressing Towards Goal  Fall Risk Interventions:            Medication Interventions: Evaluate medications/consider consulting pharmacy, Teach patient to arise slowly

## 2018-04-15 LAB
ANION GAP SERPL CALC-SCNC: 13 MMOL/L (ref 3–18)
ATRIAL RATE: 51 BPM
BASOPHILS # BLD: 0 K/UL (ref 0–0.06)
BASOPHILS NFR BLD: 0 % (ref 0–2)
BUN SERPL-MCNC: 7 MG/DL (ref 7–18)
BUN/CREAT SERPL: 8 (ref 12–20)
CALCIUM SERPL-MCNC: 8.5 MG/DL (ref 8.5–10.1)
CALCULATED P AXIS, ECG09: 61 DEGREES
CALCULATED R AXIS, ECG10: 48 DEGREES
CALCULATED T AXIS, ECG11: 43 DEGREES
CHLORIDE SERPL-SCNC: 107 MMOL/L (ref 100–108)
CO2 SERPL-SCNC: 24 MMOL/L (ref 21–32)
CREAT SERPL-MCNC: 0.86 MG/DL (ref 0.6–1.3)
DIAGNOSIS, 93000: NORMAL
DIFFERENTIAL METHOD BLD: ABNORMAL
EOSINOPHIL # BLD: 0 K/UL (ref 0–0.4)
EOSINOPHIL NFR BLD: 0 % (ref 0–5)
ERYTHROCYTE [DISTWIDTH] IN BLOOD BY AUTOMATED COUNT: 13.9 % (ref 11.6–14.5)
GLUCOSE SERPL-MCNC: 98 MG/DL (ref 74–99)
HCT VFR BLD AUTO: 38.1 % (ref 35–45)
HGB BLD-MCNC: 12.6 G/DL (ref 12–16)
LYMPHOCYTES # BLD: 0.8 K/UL (ref 0.9–3.6)
LYMPHOCYTES NFR BLD: 6 % (ref 21–52)
MAGNESIUM SERPL-MCNC: 1.9 MG/DL (ref 1.6–2.6)
MCH RBC QN AUTO: 28.6 PG (ref 24–34)
MCHC RBC AUTO-ENTMCNC: 33.1 G/DL (ref 31–37)
MCV RBC AUTO: 86.6 FL (ref 74–97)
MONOCYTES # BLD: 0.7 K/UL (ref 0.05–1.2)
MONOCYTES NFR BLD: 5 % (ref 3–10)
NEUTS SEG # BLD: 11.8 K/UL (ref 1.8–8)
NEUTS SEG NFR BLD: 89 % (ref 40–73)
P-R INTERVAL, ECG05: 144 MS
PLATELET # BLD AUTO: 171 K/UL (ref 135–420)
PMV BLD AUTO: 9.2 FL (ref 9.2–11.8)
POTASSIUM SERPL-SCNC: 4.6 MMOL/L (ref 3.5–5.5)
Q-T INTERVAL, ECG07: 510 MS
QRS DURATION, ECG06: 102 MS
QTC CALCULATION (BEZET), ECG08: 470 MS
RBC # BLD AUTO: 4.4 M/UL (ref 4.2–5.3)
SODIUM SERPL-SCNC: 144 MMOL/L (ref 136–145)
VENTRICULAR RATE, ECG03: 51 BPM
WBC # BLD AUTO: 13.3 K/UL (ref 4.6–13.2)

## 2018-04-15 PROCEDURE — 74011000250 HC RX REV CODE- 250: Performed by: HOSPITALIST

## 2018-04-15 PROCEDURE — 74011250636 HC RX REV CODE- 250/636: Performed by: ANESTHESIOLOGY

## 2018-04-15 PROCEDURE — 85025 COMPLETE CBC W/AUTO DIFF WBC: CPT | Performed by: HOSPITALIST

## 2018-04-15 PROCEDURE — 74011000258 HC RX REV CODE- 258: Performed by: HOSPITALIST

## 2018-04-15 PROCEDURE — 74011250636 HC RX REV CODE- 250/636: Performed by: SURGERY

## 2018-04-15 PROCEDURE — 74011250636 HC RX REV CODE- 250/636: Performed by: HOSPITALIST

## 2018-04-15 PROCEDURE — 65270000029 HC RM PRIVATE

## 2018-04-15 PROCEDURE — 36415 COLL VENOUS BLD VENIPUNCTURE: CPT | Performed by: HOSPITALIST

## 2018-04-15 PROCEDURE — 74011250637 HC RX REV CODE- 250/637: Performed by: SURGERY

## 2018-04-15 PROCEDURE — 80048 BASIC METABOLIC PNL TOTAL CA: CPT | Performed by: HOSPITALIST

## 2018-04-15 PROCEDURE — 74011250637 HC RX REV CODE- 250/637: Performed by: HOSPITALIST

## 2018-04-15 PROCEDURE — 77030027138 HC INCENT SPIROMETER -A

## 2018-04-15 PROCEDURE — 83735 ASSAY OF MAGNESIUM: CPT | Performed by: HOSPITALIST

## 2018-04-15 RX ORDER — FENTANYL CITRATE 50 UG/ML
50 INJECTION, SOLUTION INTRAMUSCULAR; INTRAVENOUS
Status: DISCONTINUED | OUTPATIENT
Start: 2018-04-15 | End: 2018-04-15 | Stop reason: HOSPADM

## 2018-04-15 RX ORDER — MORPHINE SULFATE 4 MG/ML
2 INJECTION, SOLUTION INTRAMUSCULAR; INTRAVENOUS
Status: DISCONTINUED | OUTPATIENT
Start: 2018-04-15 | End: 2018-04-19 | Stop reason: HOSPADM

## 2018-04-15 RX ORDER — GLYCOPYRROLATE 0.2 MG/ML
INJECTION INTRAMUSCULAR; INTRAVENOUS AS NEEDED
Status: DISCONTINUED | OUTPATIENT
Start: 2018-04-15 | End: 2018-04-15 | Stop reason: HOSPADM

## 2018-04-15 RX ORDER — FLUMAZENIL 0.1 MG/ML
0.2 INJECTION INTRAVENOUS
Status: DISCONTINUED | OUTPATIENT
Start: 2018-04-15 | End: 2018-04-15 | Stop reason: HOSPADM

## 2018-04-15 RX ORDER — MORPHINE SULFATE 4 MG/ML
2 INJECTION INTRAVENOUS ONCE
Status: COMPLETED | OUTPATIENT
Start: 2018-04-15 | End: 2018-04-15

## 2018-04-15 RX ORDER — ONDANSETRON 2 MG/ML
4 INJECTION INTRAMUSCULAR; INTRAVENOUS ONCE
Status: DISCONTINUED | OUTPATIENT
Start: 2018-04-15 | End: 2018-04-15 | Stop reason: HOSPADM

## 2018-04-15 RX ORDER — NEOSTIGMINE METHYLSULFATE 5 MG/5 ML
SYRINGE (ML) INTRAVENOUS AS NEEDED
Status: DISCONTINUED | OUTPATIENT
Start: 2018-04-15 | End: 2018-04-15 | Stop reason: HOSPADM

## 2018-04-15 RX ORDER — MORPHINE SULFATE 4 MG/ML
4 INJECTION INTRAVENOUS
Status: DISCONTINUED | OUTPATIENT
Start: 2018-04-15 | End: 2018-04-15 | Stop reason: RX

## 2018-04-15 RX ORDER — NALOXONE HYDROCHLORIDE 0.4 MG/ML
0.4 INJECTION, SOLUTION INTRAMUSCULAR; INTRAVENOUS; SUBCUTANEOUS AS NEEDED
Status: DISCONTINUED | OUTPATIENT
Start: 2018-04-15 | End: 2018-04-15 | Stop reason: HOSPADM

## 2018-04-15 RX ORDER — MORPHINE SULFATE 4 MG/ML
4 INJECTION, SOLUTION INTRAMUSCULAR; INTRAVENOUS
Status: DISCONTINUED | OUTPATIENT
Start: 2018-04-15 | End: 2018-04-19 | Stop reason: HOSPADM

## 2018-04-15 RX ORDER — HYDROMORPHONE HYDROCHLORIDE 2 MG/ML
0.5 INJECTION, SOLUTION INTRAMUSCULAR; INTRAVENOUS; SUBCUTANEOUS
Status: DISCONTINUED | OUTPATIENT
Start: 2018-04-15 | End: 2018-04-15 | Stop reason: HOSPADM

## 2018-04-15 RX ORDER — SODIUM CHLORIDE, SODIUM LACTATE, POTASSIUM CHLORIDE, CALCIUM CHLORIDE 600; 310; 30; 20 MG/100ML; MG/100ML; MG/100ML; MG/100ML
100 INJECTION, SOLUTION INTRAVENOUS CONTINUOUS
Status: DISCONTINUED | OUTPATIENT
Start: 2018-04-15 | End: 2018-04-15 | Stop reason: HOSPADM

## 2018-04-15 RX ADMIN — METRONIDAZOLE 500 MG: 500 INJECTION, SOLUTION INTRAVENOUS at 14:35

## 2018-04-15 RX ADMIN — MORPHINE SULFATE 2 MG: 4 INJECTION INTRAVENOUS at 03:31

## 2018-04-15 RX ADMIN — PIPERACILLIN SODIUM,TAZOBACTAM SODIUM 3.38 G: 3; .375 INJECTION, POWDER, FOR SOLUTION INTRAVENOUS at 08:36

## 2018-04-15 RX ADMIN — MORPHINE SULFATE 2 MG: 4 INJECTION INTRAVENOUS at 07:09

## 2018-04-15 RX ADMIN — OXYCODONE HYDROCHLORIDE AND ACETAMINOPHEN 2 TABLET: 5; 325 TABLET ORAL at 11:15

## 2018-04-15 RX ADMIN — SODIUM CHLORIDE, SODIUM LACTATE, POTASSIUM CHLORIDE, AND CALCIUM CHLORIDE 100 ML/HR: 600; 310; 30; 20 INJECTION, SOLUTION INTRAVENOUS at 01:39

## 2018-04-15 RX ADMIN — HYDROMORPHONE HYDROCHLORIDE 0.4 MG: 2 INJECTION, SOLUTION INTRAMUSCULAR; INTRAVENOUS; SUBCUTANEOUS at 00:45

## 2018-04-15 RX ADMIN — MORPHINE SULFATE 2 MG: 4 INJECTION INTRAVENOUS at 05:14

## 2018-04-15 RX ADMIN — VERAPAMIL HYDROCHLORIDE 180 MG: 180 TABLET, FILM COATED, EXTENDED RELEASE ORAL at 21:43

## 2018-04-15 RX ADMIN — FENTANYL CITRATE 50 MCG: 50 INJECTION, SOLUTION INTRAMUSCULAR; INTRAVENOUS at 00:04

## 2018-04-15 RX ADMIN — METRONIDAZOLE 500 MG: 500 INJECTION, SOLUTION INTRAVENOUS at 21:45

## 2018-04-15 RX ADMIN — METRONIDAZOLE 500 MG: 500 INJECTION, SOLUTION INTRAVENOUS at 03:30

## 2018-04-15 RX ADMIN — PIPERACILLIN SODIUM,TAZOBACTAM SODIUM 3.38 G: 3; .375 INJECTION, POWDER, FOR SOLUTION INTRAVENOUS at 02:23

## 2018-04-15 RX ADMIN — LISINOPRIL 20 MG: 20 TABLET ORAL at 08:23

## 2018-04-15 RX ADMIN — PIPERACILLIN SODIUM,TAZOBACTAM SODIUM 3.38 G: 3; .375 INJECTION, POWDER, FOR SOLUTION INTRAVENOUS at 21:44

## 2018-04-15 RX ADMIN — MORPHINE SULFATE 4 MG: 4 INJECTION INTRAVENOUS at 09:18

## 2018-04-15 RX ADMIN — MORPHINE SULFATE 2 MG: 4 INJECTION INTRAVENOUS at 08:22

## 2018-04-15 RX ADMIN — MORPHINE SULFATE 4 MG: 4 INJECTION INTRAVENOUS at 14:55

## 2018-04-15 RX ADMIN — PIPERACILLIN SODIUM,TAZOBACTAM SODIUM 3.38 G: 3; .375 INJECTION, POWDER, FOR SOLUTION INTRAVENOUS at 14:35

## 2018-04-15 RX ADMIN — METRONIDAZOLE 500 MG: 500 INJECTION, SOLUTION INTRAVENOUS at 08:22

## 2018-04-15 RX ADMIN — Medication 2 MG: at 00:39

## 2018-04-15 RX ADMIN — SODIUM CHLORIDE 100 ML/HR: 900 INJECTION, SOLUTION INTRAVENOUS at 08:37

## 2018-04-15 RX ADMIN — OXYCODONE HYDROCHLORIDE AND ACETAMINOPHEN 2 TABLET: 5; 325 TABLET ORAL at 16:18

## 2018-04-15 RX ADMIN — MORPHINE SULFATE 4 MG: 4 INJECTION, SOLUTION INTRAMUSCULAR; INTRAVENOUS at 19:59

## 2018-04-15 RX ADMIN — OXYCODONE HYDROCHLORIDE AND ACETAMINOPHEN 2 TABLET: 5; 325 TABLET ORAL at 22:06

## 2018-04-15 RX ADMIN — HYDROMORPHONE HYDROCHLORIDE 0.4 MG: 2 INJECTION, SOLUTION INTRAMUSCULAR; INTRAVENOUS; SUBCUTANEOUS at 00:51

## 2018-04-15 RX ADMIN — FENTANYL CITRATE 50 MCG: 50 INJECTION, SOLUTION INTRAMUSCULAR; INTRAVENOUS at 00:10

## 2018-04-15 RX ADMIN — ONDANSETRON 4 MG: 2 INJECTION INTRAMUSCULAR; INTRAVENOUS at 14:44

## 2018-04-15 RX ADMIN — GLYCOPYRROLATE 0.4 MG: 0.2 INJECTION INTRAMUSCULAR; INTRAVENOUS at 00:39

## 2018-04-15 RX ADMIN — MORPHINE SULFATE 2 MG: 4 INJECTION INTRAVENOUS at 01:34

## 2018-04-15 RX ADMIN — PIPERACILLIN SODIUM,TAZOBACTAM SODIUM 3.38 G: 3; .375 INJECTION, POWDER, FOR SOLUTION INTRAVENOUS at 20:20

## 2018-04-15 NOTE — ROUTINE PROCESS
02:15: Pt received as a transfer from PACU at this time. Pt arrived on unit via bed with reporting nurse Dolly Lira RN) and one CNA in attendance. Pt was awake though groggy and moaning audibly. Pt had just received PRN MS04 prior to transfer and reported minimal pain relief. Pt inquired as to the availability of MS04 and was informed she could have it Q2 hours PRN. Collado patent and draining clear yellow urine. IVF started as ordered and IV/ABX resumed. ABD dressing to mid abdomen presents with small area of shadowing whose borders were marked shortly after procedure and presents as unchanged. SCD's applied. Sister in attendance at bedside. 05:14: Pt has requested and received PRN MS04 2 MG X's 3 since above note was appended. Pt reports pain relief for approximately ninety (90) minutes following administration, but states the \"last half hour I have to wait is rough. \" DSD to ABD presents without change. SEYMOUR drains to right and left abdomen patent and draining sero sanguineous fluid. Collado patent and draining clear yellow urine (300 ml's as of 05:00).

## 2018-04-15 NOTE — PROGRESS NOTES
Chart reviewed. Spoke with patient on phone. Pt lives with her son, Layo Martinez and her DIL. Pt denies using any DME. Pt states she has not yet worked with PT/OT. Offered foc for home health. Pt will work with PT/OT and then decide if she will need HH at discharge. CM will cont to follow.

## 2018-04-15 NOTE — PROGRESS NOTES
0730 - Bedside shift change report given to Ginette Pizano RN (oncoming nurse) by Liliane Munson RN (offgoing nurse). Report included the following information SBAR, Kardex, ED Summary, OR Summary, Intake/Output, MAR and Recent Results. Pt resting comfortably in bed; TO from Dr. Annabel Woodard for increased morphine d/t poorly controlled post-op pain. Diet advanced to clear liquid; tolerating well. Has not ambulated since procedure. Summary: Pain well controlled on current regimen of staggered morphine and percocet. No new drainage to abd dsg.  25 and 20 cc SS drainage to JPs. Collado was removed at 1750hrs; pt aware of goal to void by 2230hrs. Pt ambulated length of hallway and is compliant with IS, TCDB, SCDs. Patient Vitals for the past 12 hrs:   Temp Pulse Resp BP SpO2   04/15/18 1539 98 °F (36.7 °C) 95 20 153/89 94 %   04/15/18 1148 98 °F (36.7 °C) 89 18 170/82 91 %   04/15/18 0801 98 °F (36.7 °C) 88 18 169/89 93 %     1910 - Bedside shift change report given to Liliane Munson RN (oncoming nurse) by Ginette Pizano RN (offgoing nurse). Report included the following information SBAR, Kardex, ED Summary, OR Summary, Intake/Output, MAR and Recent Results.

## 2018-04-15 NOTE — PROGRESS NOTES
Problem: Falls - Risk of  Goal: *Absence of Falls  Document Bossman Fall Risk and appropriate interventions in the flowsheet.    Outcome: Progressing Towards Goal  Fall Risk Interventions:  Mobility Interventions: Assess mobility with egress test, Communicate number of staff needed for ambulation/transfer, Patient to call before getting OOB         Medication Interventions: Evaluate medications/consider consulting pharmacy, Patient to call before getting OOB, Teach patient to arise slowly

## 2018-04-15 NOTE — PERIOP NOTES
Floor nurse Bryson  reviewing SBAR
Patietn accepted by Parul Pina, ,   Visit Vitals    /81    Pulse 90    Temp 97.9 °F (36.6 °C)    Resp 21    Ht 5' 5\" (1.651 m)    Wt 90.4 kg (199 lb 3.2 oz)    SpO2 96%    Breastfeeding No    BMI 33.15 kg/m2   Sister accompanied patient from PACU to room
Received patient from, OR nurse & anesthesia provide, with patient identification completed, review of procedure, and intraoperative course.
TRANSFER - OUT REPORT:    Verbal report given to Dominique Tinoco RN(name) on [facility]  being transferred to [facility](unit) for routine progression of care       Report consisted of patients Situation, Background, Assessment and   Recommendations(SBAR). Information from the following report(s) Procedure Summary, Intake/Output, MAR and Recent Results was reviewed with the receiving nurse. Lines:   Peripheral IV 04/13/18 Right Arm (Active)   Site Assessment Clean, dry, & intact 4/15/2018  1:39 AM   Phlebitis Assessment 0 4/15/2018  1:39 AM   Infiltration Assessment 0 4/15/2018  1:39 AM   Dressing Status Clean, dry, & intact 4/15/2018  1:39 AM   Dressing Type Tape;Transparent 4/15/2018  1:39 AM   Hub Color/Line Status Capped 4/15/2018  1:39 AM   Action Taken Open ports on tubing capped 4/14/2018  7:57 AM   Alcohol Cap Used Yes 4/14/2018  3:07 PM       Peripheral IV 04/14/18 Left Forearm (Active)   Site Assessment Clean, dry, & intact 4/15/2018  1:39 AM   Phlebitis Assessment 0 4/15/2018  1:39 AM   Infiltration Assessment 0 4/15/2018  1:39 AM   Dressing Status Clean, dry, & intact 4/15/2018  1:39 AM   Dressing Type Tape 4/15/2018  1:39 AM   Hub Color/Line Status Infusing 4/15/2018  1:39 AM        Opportunity for questions and clarification was provided.       Patient transported with:   O2 @ 2 liters
55 yo male complaining of cough, body pain, difficulty breathing for chest congestion for several weeks. As per family, daughter was sick with the flu and pt now has similar symptoms. Patient coughing, productive cough. Lungs congested, heart sounds normal, no edema noted at this moment. Abdomen soft, non distended. pt hear only 5%. IV placed on left hand, 20 G, medication given, respiratory treatment given. IV fluids running. blood drawn, sent to lab. Will continue to monitor closely.

## 2018-04-15 NOTE — ANESTHESIA POSTPROCEDURE EVALUATION
Post-Anesthesia Evaluation and Assessment    Cardiovascular Function/Vital Signs  Visit Vitals    /76    Pulse 85    Temp 36.9 °C (98.4 °F)    Resp 23    Ht 5' 5\" (1.651 m)    Wt 90.4 kg (199 lb 3.2 oz)    SpO2 100%    Breastfeeding No    BMI 33.15 kg/m2       Patient is status post Procedure(s):  COLON RESECTION OPEN LOW ANTERIOR RESECTION, SPLEENIC FLEXTURE MOBILIZATION. Nausea/Vomiting: Controlled. Postoperative hydration reviewed and adequate. Pain:  Pain Scale 1: Numeric (0 - 10) (04/15/18 0118)  Pain Intensity 1: 3 (04/15/18 0118)   Managed. Neurological Status: At baseline. Mental Status and Level of Consciousness: Arousable. Pulmonary Status:   O2 Device: Nasal cannula (04/15/18 0118)   Adequate oxygenation and airway patent. Complications related to anesthesia: None    Post-anesthesia assessment completed. No concerns. Patient has met all discharge requirements.     Signed By: Jeffry Ny CRNA    April 15, 2018

## 2018-04-15 NOTE — ROUTINE PROCESS
Bedside, Verbal and Written shift change report given to FRANK Yousif (oncoming nurse) by Armando Koch RN (offgoing nurse). Report included the following information SBAR, Kardex, MAR and Recent Results.

## 2018-04-16 LAB
ANION GAP SERPL CALC-SCNC: 9 MMOL/L (ref 3–18)
BASOPHILS # BLD: 0 K/UL (ref 0–0.06)
BASOPHILS NFR BLD: 0 % (ref 0–2)
BUN SERPL-MCNC: 8 MG/DL (ref 7–18)
BUN/CREAT SERPL: 11 (ref 12–20)
CALCIUM SERPL-MCNC: 8.2 MG/DL (ref 8.5–10.1)
CHLORIDE SERPL-SCNC: 109 MMOL/L (ref 100–108)
CO2 SERPL-SCNC: 25 MMOL/L (ref 21–32)
CREAT SERPL-MCNC: 0.72 MG/DL (ref 0.6–1.3)
DIFFERENTIAL METHOD BLD: ABNORMAL
EOSINOPHIL # BLD: 0 K/UL (ref 0–0.4)
EOSINOPHIL NFR BLD: 0 % (ref 0–5)
ERYTHROCYTE [DISTWIDTH] IN BLOOD BY AUTOMATED COUNT: 13.8 % (ref 11.6–14.5)
GLUCOSE SERPL-MCNC: 91 MG/DL (ref 74–99)
HCT VFR BLD AUTO: 29.5 % (ref 35–45)
HGB BLD-MCNC: 9.6 G/DL (ref 12–16)
LYMPHOCYTES # BLD: 1.9 K/UL (ref 0.9–3.6)
LYMPHOCYTES NFR BLD: 17 % (ref 21–52)
MAGNESIUM SERPL-MCNC: 2.2 MG/DL (ref 1.6–2.6)
MCH RBC QN AUTO: 27.7 PG (ref 24–34)
MCHC RBC AUTO-ENTMCNC: 32.5 G/DL (ref 31–37)
MCV RBC AUTO: 85.3 FL (ref 74–97)
MONOCYTES # BLD: 0.9 K/UL (ref 0.05–1.2)
MONOCYTES NFR BLD: 8 % (ref 3–10)
NEUTS SEG # BLD: 8 K/UL (ref 1.8–8)
NEUTS SEG NFR BLD: 75 % (ref 40–73)
PLATELET # BLD AUTO: 151 K/UL (ref 135–420)
PMV BLD AUTO: 8.7 FL (ref 9.2–11.8)
POTASSIUM SERPL-SCNC: 3.5 MMOL/L (ref 3.5–5.5)
RBC # BLD AUTO: 3.46 M/UL (ref 4.2–5.3)
SODIUM SERPL-SCNC: 143 MMOL/L (ref 136–145)
WBC # BLD AUTO: 10.8 K/UL (ref 4.6–13.2)

## 2018-04-16 PROCEDURE — 85025 COMPLETE CBC W/AUTO DIFF WBC: CPT | Performed by: HOSPITALIST

## 2018-04-16 PROCEDURE — 74011250637 HC RX REV CODE- 250/637: Performed by: HOSPITALIST

## 2018-04-16 PROCEDURE — 74011250636 HC RX REV CODE- 250/636: Performed by: HOSPITALIST

## 2018-04-16 PROCEDURE — 97167 OT EVAL HIGH COMPLEX 60 MIN: CPT

## 2018-04-16 PROCEDURE — 77030011256 HC DRSG MEPILEX <16IN NO BORD MOLN -A

## 2018-04-16 PROCEDURE — 97161 PT EVAL LOW COMPLEX 20 MIN: CPT

## 2018-04-16 PROCEDURE — 80048 BASIC METABOLIC PNL TOTAL CA: CPT | Performed by: HOSPITALIST

## 2018-04-16 PROCEDURE — 65270000029 HC RM PRIVATE

## 2018-04-16 PROCEDURE — 74011250637 HC RX REV CODE- 250/637: Performed by: SURGERY

## 2018-04-16 PROCEDURE — 97535 SELF CARE MNGMENT TRAINING: CPT

## 2018-04-16 PROCEDURE — 74011000250 HC RX REV CODE- 250: Performed by: HOSPITALIST

## 2018-04-16 PROCEDURE — 74011250636 HC RX REV CODE- 250/636: Performed by: SURGERY

## 2018-04-16 PROCEDURE — 74011000258 HC RX REV CODE- 258: Performed by: HOSPITALIST

## 2018-04-16 PROCEDURE — 36415 COLL VENOUS BLD VENIPUNCTURE: CPT | Performed by: HOSPITALIST

## 2018-04-16 PROCEDURE — 83735 ASSAY OF MAGNESIUM: CPT | Performed by: HOSPITALIST

## 2018-04-16 RX ORDER — FUROSEMIDE 10 MG/ML
20 INJECTION INTRAMUSCULAR; INTRAVENOUS ONCE
Status: COMPLETED | OUTPATIENT
Start: 2018-04-16 | End: 2018-04-16

## 2018-04-16 RX ORDER — METRONIDAZOLE 250 MG/1
500 TABLET ORAL EVERY 8 HOURS
Status: DISCONTINUED | OUTPATIENT
Start: 2018-04-16 | End: 2018-04-19 | Stop reason: HOSPADM

## 2018-04-16 RX ORDER — KETOROLAC TROMETHAMINE 15 MG/ML
15 INJECTION, SOLUTION INTRAMUSCULAR; INTRAVENOUS EVERY 6 HOURS
Status: DISCONTINUED | OUTPATIENT
Start: 2018-04-16 | End: 2018-04-19 | Stop reason: HOSPADM

## 2018-04-16 RX ORDER — GABAPENTIN 100 MG/1
200 CAPSULE ORAL 3 TIMES DAILY
Status: DISCONTINUED | OUTPATIENT
Start: 2018-04-16 | End: 2018-04-19 | Stop reason: HOSPADM

## 2018-04-16 RX ORDER — ENOXAPARIN SODIUM 100 MG/ML
40 INJECTION SUBCUTANEOUS EVERY 24 HOURS
Status: DISCONTINUED | OUTPATIENT
Start: 2018-04-16 | End: 2018-04-19 | Stop reason: HOSPADM

## 2018-04-16 RX ADMIN — KETOROLAC TROMETHAMINE 15 MG: 15 INJECTION, SOLUTION INTRAMUSCULAR; INTRAVENOUS at 05:55

## 2018-04-16 RX ADMIN — PIPERACILLIN SODIUM,TAZOBACTAM SODIUM 3.38 G: 3; .375 INJECTION, POWDER, FOR SOLUTION INTRAVENOUS at 21:28

## 2018-04-16 RX ADMIN — METRONIDAZOLE 500 MG: 500 INJECTION, SOLUTION INTRAVENOUS at 09:58

## 2018-04-16 RX ADMIN — METRONIDAZOLE 500 MG: 500 INJECTION, SOLUTION INTRAVENOUS at 03:14

## 2018-04-16 RX ADMIN — PIPERACILLIN SODIUM,TAZOBACTAM SODIUM 3.38 G: 3; .375 INJECTION, POWDER, FOR SOLUTION INTRAVENOUS at 09:05

## 2018-04-16 RX ADMIN — KETOROLAC TROMETHAMINE 15 MG: 15 INJECTION, SOLUTION INTRAMUSCULAR; INTRAVENOUS at 11:40

## 2018-04-16 RX ADMIN — OXYCODONE HYDROCHLORIDE AND ACETAMINOPHEN 2 TABLET: 5; 325 TABLET ORAL at 15:37

## 2018-04-16 RX ADMIN — GABAPENTIN 200 MG: 100 CAPSULE ORAL at 15:16

## 2018-04-16 RX ADMIN — KETOROLAC TROMETHAMINE 15 MG: 15 INJECTION, SOLUTION INTRAMUSCULAR; INTRAVENOUS at 01:02

## 2018-04-16 RX ADMIN — LISINOPRIL 20 MG: 20 TABLET ORAL at 09:06

## 2018-04-16 RX ADMIN — FUROSEMIDE 20 MG: 10 INJECTION, SOLUTION INTRAMUSCULAR; INTRAVENOUS at 21:47

## 2018-04-16 RX ADMIN — GABAPENTIN 200 MG: 100 CAPSULE ORAL at 21:30

## 2018-04-16 RX ADMIN — PIPERACILLIN SODIUM,TAZOBACTAM SODIUM 3.38 G: 3; .375 INJECTION, POWDER, FOR SOLUTION INTRAVENOUS at 02:26

## 2018-04-16 RX ADMIN — ONDANSETRON 4 MG: 2 INJECTION INTRAMUSCULAR; INTRAVENOUS at 18:35

## 2018-04-16 RX ADMIN — KETOROLAC TROMETHAMINE 15 MG: 15 INJECTION, SOLUTION INTRAMUSCULAR; INTRAVENOUS at 17:40

## 2018-04-16 RX ADMIN — VANCOMYCIN HYDROCHLORIDE 250 MG: 1 INJECTION, POWDER, LYOPHILIZED, FOR SOLUTION INTRAVENOUS at 23:50

## 2018-04-16 RX ADMIN — MORPHINE SULFATE 4 MG: 4 INJECTION, SOLUTION INTRAMUSCULAR; INTRAVENOUS at 04:20

## 2018-04-16 RX ADMIN — VANCOMYCIN HYDROCHLORIDE 250 MG: 1 INJECTION, POWDER, LYOPHILIZED, FOR SOLUTION INTRAVENOUS at 18:52

## 2018-04-16 RX ADMIN — ENOXAPARIN SODIUM 40 MG: 40 INJECTION SUBCUTANEOUS at 15:16

## 2018-04-16 RX ADMIN — METRONIDAZOLE 500 MG: 250 TABLET ORAL at 21:29

## 2018-04-16 RX ADMIN — MORPHINE SULFATE 2 MG: 4 INJECTION, SOLUTION INTRAMUSCULAR; INTRAVENOUS at 23:55

## 2018-04-16 RX ADMIN — VERAPAMIL HYDROCHLORIDE 180 MG: 180 TABLET, FILM COATED, EXTENDED RELEASE ORAL at 21:29

## 2018-04-16 RX ADMIN — OXYCODONE HYDROCHLORIDE AND ACETAMINOPHEN 2 TABLET: 5; 325 TABLET ORAL at 21:47

## 2018-04-16 RX ADMIN — PIPERACILLIN SODIUM,TAZOBACTAM SODIUM 3.38 G: 3; .375 INJECTION, POWDER, FOR SOLUTION INTRAVENOUS at 15:16

## 2018-04-16 RX ADMIN — GABAPENTIN 200 MG: 100 CAPSULE ORAL at 09:06

## 2018-04-16 RX ADMIN — KETOROLAC TROMETHAMINE 15 MG: 15 INJECTION, SOLUTION INTRAMUSCULAR; INTRAVENOUS at 23:49

## 2018-04-16 RX ADMIN — METRONIDAZOLE 500 MG: 250 TABLET ORAL at 15:16

## 2018-04-16 RX ADMIN — OXYCODONE HYDROCHLORIDE AND ACETAMINOPHEN 2 TABLET: 5; 325 TABLET ORAL at 09:58

## 2018-04-16 NOTE — PROGRESS NOTES
Problem: Mobility Impaired (Adult and Pediatric)  Goal: *Acute Goals and Plan of Care (Insert Text)  Physical Therapy Goals  Initiated 4/16/2018 and to be accomplished within 7 day(s)  1. Patient will move from supine to sit and sit to supine  in bed with supervision/set-up. 2.  Patient will transfer from bed to chair and chair to bed with supervision/set-up using the least restrictive device. 3.  Patient will perform sit to stand with supervision/set-up. 4.  Patient will ambulate with supervision/set-up for >150 feet with the least restrictive device. 5.  Patient will ascend/descend a full flight of stairs with handrail(s) with minimal assistance/contact guard assist for safe home entry. physical Therapy EVALUATION    Patient: Doris Marquez (48 y.o. female)  Date: 4/16/2018  Primary Diagnosis: Diverticulitis of sigmoid colon  ruptured diverticulum  Porforated diverticulum   Procedure(s) (LRB):  COLON RESECTION OPEN LOW ANTERIOR RESECTION, SPLEENIC FLEXTURE MOBILIZATION (N/A) 2 Days Post-Op   Precautions:   Fall    ASSESSMENT :  Based on the objective data described below, Patient present to PT with decreased functional mobility with regard to bed mobility, transfers, gait, balance, weakness, and overall tolerance for activity s/p colon resection. Pt reports 3/10 abdominal/flank pain. During bed mobility pt required increased time, CGA and verbal/tactile cuing. During gait training pt ambulated 44 feet with GB use with a wide YAYA and slow/antalgic gait pattern with some dizziness/lightheadedness noted. Left pt in bed with all needs met, call bell within reach and nursing staff notified of pt progress with PT. Recommend HHPT at time of discharge. Patient will benefit from skilled intervention to address the above impairments.   Patients rehabilitation potential is considered to be Good  Factors which may influence rehabilitation potential include:   []         None noted  []         Mental ability/status  [x]         Medical condition  []         Home/family situation and support systems  []         Safety awareness  [x]         Pain tolerance/management  []         Other:      PLAN :  Recommendations and Planned Interventions:  [x]           Bed Mobility Training             [x]    Neuromuscular Re-Education  [x]           Transfer Training                   []    Orthotic/Prosthetic Training  [x]           Gait Training                          []    Modalities  [x]           Therapeutic Exercises          []    Edema Management/Control  [x]           Therapeutic Activities            [x]    Patient and Family Training/Education  []           Other (comment):    Frequency/Duration: Patient will be followed by physical therapy 1-2 times per day/4-7 days per week to address goals. Discharge Recommendations: Home Health  Further Equipment Recommendations for Discharge: TBD     SUBJECTIVE:   Patient stated I am feeling better today, just sore.     OBJECTIVE DATA SUMMARY:     Past Medical History:   Diagnosis Date    Arthritis     GERD (gastroesophageal reflux disease)     Hepatitis C     Hypertension     SVT (supraventricular tachycardia) (HCC)     WPW (Nahomy-Parkinson-White syndrome)      Past Surgical History:   Procedure Laterality Date    HX KNEE ARTHROSCOPY Right     HX OTHER SURGICAL  1997    Hx knee surgery R knee    HX SVT ABLATION       Barriers to Learning/Limitations: None  Compensate with: visual, verbal, tactile, kinesthetic cues/model  Prior Level of Function/Home Situation: Pt stated she was independent with ADLs and mobility.   Home Situation  Home Environment: Private residence  # Steps to Enter: 0  One/Two Story Residence: Two story  # of Interior Steps: 14  Height of Each Step (in): 6 inches  Interior Rails: Left  Lift Chair Available: No  Living Alone: No  Support Systems: Child(juliette), Family member(s), Friends \ neighbors  Patient Expects to be Discharged to[employer] residence  Current DME Used/Available at Home: None  Critical Behavior:  Neurologic State: Alert; Appropriate for age  Orientation Level: Appropriate for age;Oriented X4  Cognition: Appropriate decision making; Appropriate for age attention/concentration; Appropriate safety awareness; Follows commands  Safety/Judgement: Awareness of environment; Fall prevention;Good awareness of safety precautions; Insight into deficits  Psychosocial  Patient Behaviors: Calm; Cooperative; Talkative   Strength:    Strength: Generally decreased, functional   Tone & Sensation:   Tone: Normal   Sensation: Intact   Range Of Motion:  AROM: Generally decreased, functional   PROM: Within functional limits   Functional Mobility:  Bed Mobility:  Rolling: Supervision  Supine to Sit: Contact guard assistance; Additional time  Sit to Supine: Contact guard assistance; Additional time  Scooting: Contact guard assistance; Additional time  Transfers:  Sit to Stand: Contact guard assistance; Additional time  Stand to Sit: Supervision; Additional time  Balance:   Sitting: Intact  Standing: Intact; Without support  Ambulation/Gait Training:  Distance (ft): 44 Feet (ft)  Assistive Device: Gait belt  Ambulation - Level of Assistance: Supervision   Gait Description (WDL): Exceptions to WDL  Gait Abnormalities: Decreased step clearance   Base of Support: Widened   Speed/Tisha: Slow;Shuffled  Step Length: Right shortened;Left shortened  Swing Pattern: Right asymmetrical;Left asymmetrical   Interventions: Visual/Demos; Verbal cues; Tactile cues; Safety awareness training   Therapeutic Exercises:   HEP included ankle pumps, LAQ, heel slides x 10 reps  Pain:  Pain Scale 1: Numeric (0 - 10)  Pain Intensity 1: 6  Pain Location 1: Abdomen  Pain Orientation 1: Anterior  Pain Description 1: Aching  Pain Intervention(s) 1: Medication (see MAR)  Activity Tolerance:   Fair+  Please refer to the flowsheet for vital signs taken during this treatment.   After treatment:   [] Patient left in no apparent distress sitting up in chair  [x]         Patient left in no apparent distress in bed  [x]         Call bell left within reach  [x]         Nursing notified  []         Caregiver present  []         Bed alarm activated    COMMUNICATION/EDUCATION:   [x]         Fall prevention education was provided and the patient/caregiver indicated understanding. [x]         Patient/family have participated as able in goal setting and plan of care. [x]         Patient/family agree to work toward stated goals and plan of care. []         Patient understands intent and goals of therapy, but is neutral about his/her participation. []         Patient is unable to participate in goal setting and plan of care. Thank you for this referral.  Federico Severance Rosier PT, DPT       Time Calculation: 16 mins     Mobility:  Current  CI= 1-19%   Goal  CI= 1-19%. The severity rating is based on the Other Level of assistance required for mobility.      Eval Complexity: History: MEDIUM  Complexity : 1-2 comorbidities / personal factors will impact the outcome/ POC Exam:MEDIUM Complexity : 3 Standardized tests and measures addressing body structure, function, activity limitation and / or participation in recreation  Presentation: LOW Complexity : Stable, uncomplicated  Clinical Decision Making:Low Complexity Pt ambulated >20 feet Overall Complexity:LOW

## 2018-04-16 NOTE — BRIEF OP NOTE
BRIEF OPERATIVE NOTE    Date of Procedure: 4/14/2018   Preoperative Diagnosis: Perforated diverticulitis  Postoperative Diagnosis: Perforated diverticulitis     Procedure(s):  COLON RESECTION OPEN LOW ANTERIOR RESECTION, SPLENIC FLEXTURE MOBILIZATION  Surgeon(s) and Role:     * Ace Hewitt DO - Primary         Surgical Assistant:     Surgical Staff:  Circ-1: Vipul Byrne, RN  Scrub RN-1: Rain Virgen RN  Surg Asst-1: Angelique Rooney  Event Time In   Incision Start 2040   Incision Close 0051     Anesthesia: General   Estimated Blood Loss: 300  Specimens:   ID Type Source Tests Collected by Time Destination   1 : SIGMOID COLON Preservative Colon  Ace Hewitt, DO 4/14/2018 2200 Pathology   2 : SIGMOID COLON DOUGHNUTS Preservative   Ace Hewitt, DO 4/14/2018 2344 Pathology   3 : OMENTUM Preservative   Ace Hewitt, DO 4/14/2018 2350 Pathology      Findings: thick adhesions of omentum throughout abdominal wall. Perforation with omentum phlegmon LLQ.  Leak test negative   Complications: none  Implants: * No implants in log *

## 2018-04-16 NOTE — ROUTINE PROCESS
Bedside, Verbal and Written shift change report given to Renan Owens RN (oncoming nurse) by Daniela Ortiz. Jonnie Zambrano (offgoing nurse). Report included the following information SBAR, Kardex, MAR and Recent Results.

## 2018-04-16 NOTE — PROGRESS NOTES
Hospitalist Progress Note    Patient: Maru Snow MRN: [id number]  CSN: [id number]    YOB: 1965  Age: 48 y.o.   Sex: female    DOA: 4/12/2018 LOS:  LOS: 4 days          Chief Complaint:  Diverticulitis with perforation        Assessment/Plan     Diverticulitis with perforation  S/p colon surgery, partial colectomy  c diff colitis  Hep C  Cirrhosis    Doing better  Diet as per surgery  Walk in room, OOB  Change to PO flagyl and vanco    BMP cbc in am    DVT prophylaxis    dispo as per surgery  Disposition :  Patient Active Problem List   Diagnosis Code    Chronic hepatitis C (HCC) B18.2    Cervical spondylarthritis M47.812    Carpal tunnel syndrome G56.00    Hypertension I10    H/O arthroscopic knee surgery Z98.890    WPW (Nahomy-Parkinson-White syndrome) I45.6    Cirrhosis ([facility] [address] K74.60    Acute diverticulitis K57.92    Diverticulitis of sigmoid colon K57.32    Perforation of sigmoid colon due to diverticulitis K57.20    C. difficile colitis A04.72    Hypokalemia E87.6       Subjective:    Feeling better  Minimal abd pain  Has passed some gas  No BM yet    Denies dysuria    Eating clears ok    Review of systems:    Constitutional: denies fevers, chills, myalgias  Respiratory: denies SOB, cough  Cardiovascular: denies chest pain, palpitations        Vital signs/Intake and Output:  Visit Vitals    /63 (BP 1 Location: Left arm, BP Patient Position: At rest)    Pulse 73    Temp 98.9 °F (37.2 °C)    Resp 17    Ht 5' 5\" (1.651 m)    Wt 93.7 kg (206 lb 8 oz)    SpO2 90%    Breastfeeding No    BMI 34.36 kg/m2     Current Shift:  04/16 0701 - 04/16 1900  In: -   Out: 490 [Urine:450; Drains:40]  Last three shifts:  04/14 1901 - 04/16 0700  In: 6024 [I.V.:6024]  Out: 2825 [Urine:2375; Drains:150]    Exam:    General: Well developed, alert, NAD, OX3  CVS:Regular rate and rhythm, no M/R/G, S1/S2 heard, no thrill  Lungs:Clear to auscultation bilaterally, no wheezes, rhonchi, or rales  Abdomen: mild dist, dressed surgery site, few BS, drain in place  Extremities: No C/C/E, pulses palpable 2+  Neuro:grossly normal , follows commands  Psych:appropriate                Labs: Results:       Chemistry Recent Labs      04/16/18   0535  04/15/18   0742   GLU  91  98   NA  143  144   K  3.5  4.6   CL  109*  107   CO2  25  24   BUN  8  7   CREA  0.72  0.86   CA  8.2*  8.5   AGAP  9  13   BUCR  11*  8*      CBC w/Diff Recent Labs      04/16/18   0535  04/15/18   0742   WBC  10.8  13.3*   RBC  3.46*  4.40   HGB  9.6*  12.6   HCT  29.5*  38.1   PLT  151  171   GRANS  75*  89*   LYMPH  17*  6*   EOS  0  0      Cardiac Enzymes No results for input(s): CPK, CKND1, GHASSAN in the last 72 hours. No lab exists for component: CKRMB, TROIP   Coagulation No results for input(s): PTP, INR, APTT in the last 72 hours. No lab exists for component: INREXT    Lipid Panel No results found for: CHOL, CHOLPOCT, CHOLX, CHLST, CHOLV, 384359, HDL, LDL, LDLC, DLDLP, 558669, VLDLC, VLDL, TGLX, TRIGL, TRIGP, TGLPOCT, CHHD, CHHDX   BNP No results for input(s): BNPP in the last 72 hours. Liver Enzymes No results for input(s): TP, ALB, TBIL, AP, SGOT, GPT in the last 72 hours.     No lab exists for component: DBIL   Thyroid Studies No results found for: T4, T3U, TSH, TSHEXT     Procedures/imaging: see electronic medical records for all procedures/Xrays and details which were not copied into this note but were reviewed prior to creation of Stacy Dave MD

## 2018-04-16 NOTE — ROUTINE PROCESS
19:40: Received verbal/bedside change of shift report from FRANK Wilson RN. Report included SBAR, KARDEX, MAR and recent results. 20:00: Pt received resting quietly in bed with visitors in attendance and in NAD. Respirations even and unlabored, skin W&D. IVF/ABX infusing as ordered. SCD's in place. Pt's liu catheter was removed earlier this afternoon, pt reports not having voided up to this point. At 22:30 pt will be bladder scanned if she has not voided at that point. Pt verbalized her understanding and requested that we \"keep up with her pain meds. \"  Pt assured this would be carried out.      06:30: Pt slept soundly majority of shift overnight with brief periods of wakefulness noted. These periods coincided with requests for PRN analgesics, and on two occasions pt requested assistance to ambulate to and from BR.

## 2018-04-16 NOTE — PROGRESS NOTES
Problem: Self Care Deficits Care Plan (Adult)  Goal: *Acute Goals and Plan of Care (Insert Text)  Initial Occupational Therapy Goals (4/16/2018) Within 7 day(s):    1. Patient will perform toilet transfer with S/mod I in preparation for bowel and bladder management. 2. Patient will perform bowel and bladder management with S/mod I for increased independence with ADLs. 3. Patient will perform UB dressing with setup for increased independence with ADLs. 4. Patient will perform LB dressing with setup & A/E PRN for increased independence with ADLs. 5. Patient will perform bed mob with mod I with 1-2 verbal cues for increased independence with ADLs. 6. Patient will utilize energy conservation techniques with 1 verbal cue for increased independence with ADLs. Outcome: Progressing Towards Goal  Occupational Therapy EVALUATION    Patient: Chinyere Babb (48 y.o. female)  Date: 4/16/2018  Primary Diagnosis: Diverticulitis of sigmoid colon  ruptured diverticulum  Porforated diverticulum   Procedure(s) (LRB):  COLON RESECTION OPEN LOW ANTERIOR RESECTION, SPLEENIC FLEXTURE MOBILIZATION (N/A) 2 Days Post-Op   Precautions:   Fall (Abdominal incision), CONTACT Iso for c-diff    ASSESSMENT :  Based on the objective data described below, the patient presents with decreased functional strength, decreased functional balance, decreased overall activity tolerance limiting independence with ADLs. Patient limited in LE ADLs d/t abdominal pain and recent surgery. Pt requires increased time for scooting for bed mob. Instructed on log rolling w/ pt having difficulty w/ f/t. Pt noted to attempt \"furniture walking\" and provided RW w/ improvement in pain while walking to/from bathroom for toileting and improved balance. Pt able to stand at sink and perform simple grooming. Pt would benefit from continued OT services to maximize independence in LE ADLs.     Education: Reviewed Abdominal incision Precautions, body mechanics, home safety, adaptive strategies and adaptive dressing techniques including clothing modifications with patient verbalizing understanding at this time. Patient will benefit from skilled intervention to address the above impairments. Patients rehabilitation potential is considered to be Good  Factors which may influence rehabilitation potential include:   []             None noted  []             Mental ability/status  [x]             Medical condition  []             Home/family situation and support systems  []             Safety awareness  [x]             Pain tolerance/management  []             Other:      PLAN :  Recommendations and Planned Interventions:  [x]               Self Care Training                  [x]        Therapeutic Activities  [x]               Functional Mobility Training    [x]        Cognitive Retraining  [x]               Therapeutic Exercises           [x]        Endurance Activities  [x]               Balance Training                   [x]        Neuromuscular Re-Education  []               Visual/Perceptual Training     [x]   Home Safety Training  [x]               Patient Education                 [x]        Family Training/Education  []               Other (comment):    Frequency/Duration: Patient will be followed by occupational therapy 1-2 times per day/3-5 days per week to address goals. Discharge Recommendations: Home Health  Further Equipment Recommendations for Discharge: shower chair     SUBJECTIVE:   Patient stated I'll have my sister to help me during the day.     OBJECTIVE DATA SUMMARY:     Past Medical History:   Diagnosis Date    Arthritis     GERD (gastroesophageal reflux disease)     Hepatitis C     Hypertension     SVT (supraventricular tachycardia) (HCC)     WPW (Nahomy-Parkinson-White syndrome)      Past Surgical History:   Procedure Laterality Date    HX KNEE ARTHROSCOPY Right     HX OTHER SURGICAL  1997    Hx knee surgery R knee    HX SVT ABLATION       Barriers to Learning/Limitations: yes;  physical  Compensate with: visual, verbal, tactile, kinesthetic cues/model    Prior Level of Function/Home Situation: Pt w/ supportive family (son & DIL work; grandkids go to school; sister going to assist during day); architectural barriers of stairs  Home Situation  Home Environment: Private residence  # Steps to Enter: 0  One/Two Story Residence: Two story  # of Interior Steps: 14  Height of Each Step (in): 6 inches  Interior Rails: Left  Lift Chair Available: No  Living Alone: No  Support Systems: Child(juliette), Family member(s), Friends \ neighbors  Patient Expects to be Discharged to[de-identified] Private residence  Current DME Used/Available at Home: None  Tub or Shower Type: Tub/Shower combination  [x]  Right hand dominant   []  Left hand dominant    Cognitive/Behavioral Status:  Neurologic State: Alert; Appropriate for age  Orientation Level: Appropriate for age;Oriented X4  Cognition: Appropriate for age attention/concentration; Appropriate decision making; Appropriate safety awareness; Follows commands  Safety/Judgement: Awareness of environment    Skin: abdominal incision, SEYMOUR drain; otherwise intact  Edema: abdominal    Vision/Perceptual:     WFL w/ bifocals      Coordination:  Coordination: Within functional limits  Fine Motor Skills-Upper: Left Intact; Right Intact    Gross Motor Skills-Upper: Left Intact; Right Intact    Balance:  Sitting: Intact  Standing: Intact; With support (use of RW)    Strength:  Strength: Generally decreased, functional  Tone & Sensation:  Tone: Normal  Sensation: Intact  Range of Motion:  AROM: Generally decreased, functional  PROM: Within functional limits     Functional Mobility and Transfers for ADLs:  Bed Mobility:  Rolling: Stand-by assistance  Supine to Sit: Contact guard assistance  Sit to Supine: Contact guard assistance  Scooting: Contact guard assistance  Transfers:  Sit to Stand: Contact guard assistance; Additional time; Adaptive equipment  Bed to Chair: Contact guard assistance   Toilet Transfer : Supervision   Bathroom Mobility: Supervision/set up    ADL Assessment:   Feeding: Setup    Oral Facial Hygiene/Grooming: Supervision    Bathing: Minimum assistance; Additional time    Upper Body Dressing: Setup    Lower Body Dressing: Minimum assistance; Moderate assistance; Additional time    Toileting: Stand by assistance    ADL Intervention:  Feeding  Feeding Assistance: Supervision/set-up  Container Management: Supervision/set-up  Cutting Food: Supervision/set-up  Utensil Management: Supervision/set-up  Food to Mouth: Supervision/set-up  Drink to Mouth: Supervision/set-up    Grooming  Washing Hands: Supervision/set-up    Lower Body Dressing Assistance  Underpants: Contact guard assistance;Minimum assistance  Socks: Moderate assistance  Position Performed: Long sitting on bed (ankle-over knee long sitting for socks)    Toileting  Toileting Assistance: Supervision/set up  Bladder Hygiene: Stand-by assistance  Clothing Management: Stand-by assistance    Cognitive Retraining  Problem Solving: Identifying the task; Identifying the problem;General alternative solution;Deductive reason  Safety/Judgement: Awareness of environment    Pain:  Pre-treatment: 3/10  Post-treatment: 3/10    Activity Tolerance:   Patient able to stand 1-2 minute(s). Patient able to complete ADLs with intermittent rest breaks. Patient limited by pain. Patient unsteady   Please refer to the flowsheet for vital signs taken during this treatment. After treatment:   [] Patient left in no apparent distress sitting up in chair  [x] Patient left in no apparent distress in bed  [x] Call bell left within reach  [x] Nursing notified  [] Caregiver present  [] Bed alarm activated    COMMUNICATION/EDUCATION:   [x] Home safety education was provided and the patient/caregiver indicated understanding. [x] Patient/family have participated as able in goal setting and plan of care.   [x] Patient/family agree to work toward stated goals and plan of care. [] Patient understands intent and goals of therapy, but is neutral about his/her participation. [] Patient is unable to participate in goal setting and plan of care. Thank you for this referral.  Roopa Figueroa, OTR/L  Time Calculation: 38 mins    G-Codes (GP)  Self Care   Current  CK= 40-59%   Goal  CI= 1-19%  The severity rating is based on the professional judgement & direct observation of Level of Assistance required for Functional Mobility and ADLs. Eval Complexity: History: HIGH Complexity : Extensive review of history including physical, cognitive and psychosocial history ; Examination: HIGH Complexity : 5 or more performance deficits relating to physical, cognitive , or psychosocial skils that result in activity limitations and / or participation restrictions; Decision Making:HIGH Complexity : Patient presents with comorbidities that affect occupational performance.  Signifigant modification of tasks or assistance (eg, physical or verbal) with assessment (s) is necessary to enable patient to complete evaluation

## 2018-04-16 NOTE — PROGRESS NOTES
Surgery Post Operative Day Note    Ioana Rod    Admit Date: 4/12/2018  Surgery Date:  [unfilled]  POD: 1 Day Post-Op    Subjective:      Patient present conditions: No significant medical complaints    Objective:     Patient Vitals for the past 24 hrs:   BP Temp Pulse Resp SpO2   04/15/18 2015 143/79 98.5 °F (36.9 °C) 95 18 93 %   04/15/18 1539 153/89 98 °F (36.7 °C) 95 20 94 %   04/15/18 1148 170/82 98 °F (36.7 °C) 89 18 91 %   04/15/18 0801 169/89 98 °F (36.7 °C) 88 18 93 %   04/15/18 0503 142/85 97.8 °F (36.6 °C) 85 18 98 %   04/15/18 0401 131/79 98.2 °F (36.8 °C) 83 20 98 %   04/15/18 0300 130/76 97.9 °F (36.6 °C) 84 19 -   04/15/18 0157 133/81 97.9 °F (36.6 °C) 90 - 96 %   04/15/18 0143 - - 86 21 100 %   04/15/18 0141 - - 86 19 100 %   04/15/18 0140 144/79 - 89 22 100 %   04/15/18 0139 - 97.7 °F (36.5 °C) - - -   04/15/18 0136 - - 86 23 100 %   04/15/18 0135 138/81 - 86 28 100 %   04/15/18 0130 148/76 - 85 23 100 %   04/15/18 0125 150/76 - 85 25 100 %   04/15/18 0120 140/78 - 86 24 100 %   04/15/18 0115 151/81 - 80 21 100 %   04/15/18 0110 133/78 - 79 20 100 %   04/15/18 0105 157/69 - 80 20 100 %   04/15/18 0104 - - 80 20 100 %   04/15/18 0103 - - 81 17 100 %   04/15/18 0102 - - 82 14 100 %   04/15/18 0100 149/79 98.4 °F (36.9 °C) 81 17 100 %      04/14 0701 - 04/15 1900  In: 7148 [I.V.:7148]  Out: 2260 [Urine:1875; Drains:85]  Recent Results (from the past 24 hour(s))   MAGNESIUM    Collection Time: 04/15/18  7:42 AM   Result Value Ref Range    Magnesium 1.9 1.6 - 2.6 mg/dL   METABOLIC PANEL, BASIC    Collection Time: 04/15/18  7:42 AM   Result Value Ref Range    Sodium 144 136 - 145 mmol/L    Potassium 4.6 3.5 - 5.5 mmol/L    Chloride 107 100 - 108 mmol/L    CO2 24 21 - 32 mmol/L    Anion gap 13 3.0 - 18 mmol/L    Glucose 98 74 - 99 mg/dL    BUN 7 7.0 - 18 MG/DL    Creatinine 0.86 0.6 - 1.3 MG/DL    BUN/Creatinine ratio 8 (L) 12 - 20      GFR est AA >60 >60 ml/min/1.73m2    GFR est non-AA >60 >60 ml/min/1.73m2    Calcium 8.5 8.5 - 10.1 MG/DL   CBC WITH AUTOMATED DIFF    Collection Time: 04/15/18  7:42 AM   Result Value Ref Range    WBC 13.3 (H) 4.6 - 13.2 K/uL    RBC 4.40 4.20 - 5.30 M/uL    HGB 12.6 12.0 - 16.0 g/dL    HCT 38.1 35.0 - 45.0 %    MCV 86.6 74.0 - 97.0 FL    MCH 28.6 24.0 - 34.0 PG    MCHC 33.1 31.0 - 37.0 g/dL    RDW 13.9 11.6 - 14.5 %    PLATELET 466 017 - 311 K/uL    MPV 9.2 9.2 - 11.8 FL    NEUTROPHILS 89 (H) 40 - 73 %    LYMPHOCYTES 6 (L) 21 - 52 %    MONOCYTES 5 3 - 10 %    EOSINOPHILS 0 0 - 5 %    BASOPHILS 0 0 - 2 %    ABS. NEUTROPHILS 11.8 (H) 1.8 - 8.0 K/UL    ABS. LYMPHOCYTES 0.8 (L) 0.9 - 3.6 K/UL    ABS. MONOCYTES 0.7 0.05 - 1.2 K/UL    ABS. EOSINOPHILS 0.0 0.0 - 0.4 K/UL    ABS.  BASOPHILS 0.0 0.0 - 0.06 K/UL    DF AUTOMATED         Abdomen:             Abdominal Assessment: Not passing flatus, Semi-soft, Tender  Wound:     Status:                   Peripheral IV 04/13/18 Right Arm-Dressing Status: Clean, dry, & intact  Alessandro-Santana Drain 04/15/18 Left Abdomen-Dressing Status: Clean, dry, & intact  Alessandro-Santana Drain 04/15/18 Right Abdomen-Dressing Status: Clean, dry, & intact  Peripheral IV 04/14/18 Left Forearm-Dressing Status: Clean, dry, & intact  [REMOVED] Peripheral IV 04/12/18 Left Antecubital-Dressing Status: Clean, dry, & intact    Dressing Changes:Peripheral IV 04/13/18 Right Arm-Dressing Status: Clean, dry, & intact  Alessandro-Santana Drain 04/15/18 Left Abdomen-Dressing Status: Clean, dry, & intact  Alessandro-Santana Drain 04/15/18 Right Abdomen-Dressing Status: Clean, dry, & intact  Peripheral IV 04/14/18 Left Forearm-Dressing Status: Clean, dry, & intact  [REMOVED] Peripheral IV 04/12/18 Left Antecubital-Dressing Status: Clean, dry, & intact    Pain Level:                Nausea and Vomiting: minus    Stool: minus            Assessment:     Progress: good  and adjust IV's or meds    Plan:     Diet: start clear liquids  Plan for continued care: adjust meds , mobilize and IS, PT/OT, wound care, drain care, CDiff treatment - may start oral treatment.  increase pain meds      Signed By: Heike Barron DO

## 2018-04-16 NOTE — OP NOTES
OPERATIVE NOTE    Patient: Sri Henning MRN: [id number]  SSN: [id number]    YOB: 1965  Age: 48 y.o. Sex: female      Indications: This is a 48y.o. year-old female who presents with perforated diverticulitis. she  was admitted for IV antibiotics and discharged on oral meds. She represented to the ER with worsening symptoms. She was readmitted as failing medical management. CT scan appeared to have interval worsening and stool was C. Difficile positive on admission. The patient was admitted for urgent surgery as conservative measures have failed. Date of Procedure: 4/14/2018     Preoperative Diagnosis: Perforated diverticulitis, C Difficile Colitis    Postoperative Diagnosis: Perforated diverticulitis, C Difficile Colitis       Procedure: Procedure(s):  COLON RESECTION OPEN LOW ANTERIOR RESECTION, SPLENIC FLEXTURE MOBILIZATION    Surgeon(s): Surgeon(s) and Role:     Shreya Miles, DO - Primary    Anesthesia: General     Procedure: After obtaining informed consent and properly identifying the patient and laterality the patient was brought to the operating room and placed in the operating table in the supine position. General endotracheal anesthesia was administered. The patient was then placed in the low lithotomy position and all pressure points were padded. The abdomen and perineum was prepped and draped in the standard surgical fashion. A low midline incision was made and an rene wound protector was placed. Under direct visualization an exparal TAP block was performed. The entire abdomen was inspected and was found to have diffuse dense inflammatory adhesions of the omentum to the abdominal wall. There was no evidence of distant disease except in the LLQ and pelvis. The adhesions were taken down with a combination of cautery and sharp dissection. This was done with some degree of difficulty. The sigmoid was cemented to the LLQ, pelvic side wall and pelvis.   The sigmoid was grasped and lifted. It was retracted medially and the white line of toldt was incised following it's path superiorly. Mobilization of the splenic flexure was required to allow for length of the left colon for anastomosis. Thereafter, mobilization of the sigmoid was performed. The mesentery was taken in a medial to lateral fashion special attention was taken to identify and avoid the left ureter. This was done with some difficulty due to the inflamed sigmoid being very adherent to the left side wall. A distal transection point was identified on the upper rectum distal to the splaying of the tinea and was stapled using the contour with a green load. The proximal transection point was then identified in the left colon in a soft area. The impact was used to transect the adjacent mesentery. The specimen was then handed off the table. A 2-0 Prolene was utilized to create a pursestring and an anvil was placed into the proximal colon. Under direct visualization the 29 EEA stapler was placed through the anus into the rectal stump. Via palpation and visualization the spike was passed through the rectal wall just anterior to the staple line. An EEA stapled anastomosis was then performed without difficulty. Both donuts were intact. They were sent to pathology for examination. A leak test was performed with insufflation of the rectum under saline. There was no evidence of leak of the anastomosis  The area was irrigated and irrigant removed via suction and hemostasis was assured. The length of the left colon was once again identified to ensure no torsion of the colon or small bowel trapped posteriorly behind the colon. A drain was placed in the left paracolic gutter exiting through the LLQ and in the pelvis exiting the RLQ. The fascia was closed a running #1 PDS suture. Gloves and instruments were changed and the wounds irrigated.   The skin was left open for drainage and only partially closed using staples with packing with 4x4s in 4 areas of the wound. A sterile dressing was applied. The patient was awakened from anesthesia and extubated without complication and transported to the PACU for further observation. Sponge/ Needle/ Instrument count reported as correct. Estimated Blood Loss: Minimal    Findings: thick adhesions of omentum throughout abdominal wall. Perforation with omentum phlegmon LLQ. Leak test negative     Specimens:   ID Type Source Tests Collected by Time Destination   1 : SIGMOID COLON Preservative Colon  Linda Briones,  4/14/2018 2200 Pathology   2 : SIGMOID COLON DOUGHNUTS Preservative   Linda Briones, DO 4/14/2018 2344 Pathology   3 : OMENTUM Preservative   Linda Briones, DO 4/14/2018 2350 Pathology        Drains: none and (19f)Alessandro-Santana drain(s) with closed bulb suction in the pelvis and left paracolic gutter    Implants: * No implants in log *    Complications: None; patient tolerated the procedure well.     Linda Briones DO  4/16/2018  3:26 PM

## 2018-04-16 NOTE — PROGRESS NOTES
NUTRITION FOLLOW-UP    RECOMMENDATIONS/PLAN:   - Add Ensure Clear TID  - Adv diet per MD  - Avoid prolonged Clear Liquid diet as it does not meet estimated needs  Monitor labs/lytes, PO intakes, skin integrity, wt, fluid status, BM    NUTRITION ASSESSMENT:   Client Update: 46 yrs old Female with diverticulitis of sigmoid, hypokalemia, cdiff. S/p colon resection       FOOD/NUTRITION INTAKE   Diet Order:  Clear Liquid   Supplements: n/a  Food Allergies: NKFA  Average PO Intake:      No data found. Pertinent Medications:  [x] Reviewed; oxycodone, zosyn  Electrolyte Replacement Protocol: []K []Mg []PO4  Insulin:  []SSI  []Pre-meal   []Basal    []Drip  []None  Cultural/Church Food Preferences: None Identified    BIOCHEMICAL DATA & MEDICAL TESTS  Pertinent Labs:  [x] Reviewed; Ca-8.2   ANTHROPOMETRICS  Height: 5' 5\" (165.1 cm)       Weight: 93.7 kg (206 lb 8 oz)         BMI: 34.4 kg/m^2 obese (30%-39.9% BMI)   Adm Weight: 195 lbs                Weight change: +11lbs I/o +6.5 L net since admission   Adjusted Body Weight: 64.8kg     NUTRITION-FOCUSED PHYSICAL ASSESSMENT  Skin: No PU     GI: +BM 4/14/18-loose    NUTRITION PRESCRIPTION  Calories: 2107 kcal/day based on [place] x 1.2 x 1.3  Protein:  g/day based on 1.0-1.2 g/kg  CHO: 263 g/day based on 50% of total energy  Fluid: 2107 ml/day based on 1 kcal/ml         NUTRITION DIAGNOSES:   1. Excessive fat intake related to poor diet compliance as evidence by MD note of eating fried chicken leg, corn and fried rice PTA    NUTRITION INTERVENTIONS:   INTERVENTIONS:        GOALS:  1. Commercial Beverage-Ensure Clear TID 1. Adv diet per MD by next review 3 days   2. Edu:Diverticulitis diet edu before     3.  Other Adv diet pr MD      LEARNING NEEDS (Diet, Supplementation, Food/Nutrient-Drug Interaction):   [x] None Identified   [] Education provided/documented      Identified and patient: [] Declined   [x] Was not appropriate/indicated        NUTRITION MONITORING /EVALUATION:   Follow PO intake  Monitor wt  Monitor renal labs, electrolytes, fluid status     Previous Recommendations Implemented: yes        Previous Goals Met:  yes -diet adv to clear liquid diet       [] Participated in Interdisciplinary Rounds    [x] Interdisciplinary Care Plan Reviewed  DISCHARGE NUTRITION RECOMMENDATIONS ADDRESSED:      [x] To be determined closer to discharge    NUTRITION RISK:           [x] At risk                        [] Not currently at risk        Will follow-up per policy.   Dalila Mejia [address]

## 2018-04-17 LAB
ANION GAP SERPL CALC-SCNC: 5 MMOL/L (ref 3–18)
BASOPHILS # BLD: 0 K/UL (ref 0–0.1)
BASOPHILS NFR BLD: 0 % (ref 0–3)
BUN SERPL-MCNC: 6 MG/DL (ref 7–18)
BUN/CREAT SERPL: 8 (ref 12–20)
CALCIUM SERPL-MCNC: 7.8 MG/DL (ref 8.5–10.1)
CHLORIDE SERPL-SCNC: 108 MMOL/L (ref 100–108)
CO2 SERPL-SCNC: 29 MMOL/L (ref 21–32)
CREAT SERPL-MCNC: 0.73 MG/DL (ref 0.6–1.3)
DIFFERENTIAL METHOD BLD: ABNORMAL
EOSINOPHIL # BLD: 0.2 K/UL (ref 0–0.4)
EOSINOPHIL NFR BLD: 2 % (ref 0–5)
ERYTHROCYTE [DISTWIDTH] IN BLOOD BY AUTOMATED COUNT: 13.9 % (ref 11.6–14.5)
GLUCOSE SERPL-MCNC: 84 MG/DL (ref 74–99)
HCT VFR BLD AUTO: 28.7 % (ref 35–45)
HGB BLD-MCNC: 9.3 G/DL (ref 12–16)
LYMPHOCYTES # BLD: 2.4 K/UL (ref 0.8–3.5)
LYMPHOCYTES NFR BLD: 22 % (ref 20–51)
MAGNESIUM SERPL-MCNC: 1.7 MG/DL (ref 1.6–2.6)
MCH RBC QN AUTO: 27.8 PG (ref 24–34)
MCHC RBC AUTO-ENTMCNC: 32.4 G/DL (ref 31–37)
MCV RBC AUTO: 85.9 FL (ref 74–97)
MONOCYTES # BLD: 0.9 K/UL (ref 0–1)
MONOCYTES NFR BLD: 8 % (ref 2–9)
NEUTS SEG # BLD: 7.3 K/UL (ref 1.8–8)
NEUTS SEG NFR BLD: 68 % (ref 42–75)
PLATELET # BLD AUTO: 166 K/UL (ref 135–420)
PMV BLD AUTO: 9 FL (ref 9.2–11.8)
POTASSIUM SERPL-SCNC: 3.5 MMOL/L (ref 3.5–5.5)
RBC # BLD AUTO: 3.34 M/UL (ref 4.2–5.3)
RBC MORPH BLD: ABNORMAL
SODIUM SERPL-SCNC: 142 MMOL/L (ref 136–145)
WBC # BLD AUTO: 10.8 K/UL (ref 4.6–13.2)
WBC MORPH BLD: ABNORMAL

## 2018-04-17 PROCEDURE — 65270000029 HC RM PRIVATE

## 2018-04-17 PROCEDURE — 97116 GAIT TRAINING THERAPY: CPT

## 2018-04-17 PROCEDURE — 74011250636 HC RX REV CODE- 250/636: Performed by: HOSPITALIST

## 2018-04-17 PROCEDURE — 74011250637 HC RX REV CODE- 250/637: Performed by: SURGERY

## 2018-04-17 PROCEDURE — 74011250636 HC RX REV CODE- 250/636: Performed by: SURGERY

## 2018-04-17 PROCEDURE — 80048 BASIC METABOLIC PNL TOTAL CA: CPT | Performed by: HOSPITALIST

## 2018-04-17 PROCEDURE — 36415 COLL VENOUS BLD VENIPUNCTURE: CPT | Performed by: HOSPITALIST

## 2018-04-17 PROCEDURE — 83735 ASSAY OF MAGNESIUM: CPT | Performed by: HOSPITALIST

## 2018-04-17 PROCEDURE — 85025 COMPLETE CBC W/AUTO DIFF WBC: CPT | Performed by: HOSPITALIST

## 2018-04-17 PROCEDURE — 74011250637 HC RX REV CODE- 250/637: Performed by: HOSPITALIST

## 2018-04-17 RX ORDER — FUROSEMIDE 10 MG/ML
20 INJECTION INTRAMUSCULAR; INTRAVENOUS ONCE
Status: COMPLETED | OUTPATIENT
Start: 2018-04-17 | End: 2018-04-17

## 2018-04-17 RX ADMIN — OXYCODONE HYDROCHLORIDE AND ACETAMINOPHEN 2 TABLET: 5; 325 TABLET ORAL at 23:31

## 2018-04-17 RX ADMIN — MORPHINE SULFATE 4 MG: 4 INJECTION, SOLUTION INTRAMUSCULAR; INTRAVENOUS at 02:53

## 2018-04-17 RX ADMIN — ONDANSETRON 4 MG: 2 INJECTION INTRAMUSCULAR; INTRAVENOUS at 23:31

## 2018-04-17 RX ADMIN — MORPHINE SULFATE 4 MG: 4 INJECTION, SOLUTION INTRAMUSCULAR; INTRAVENOUS at 06:22

## 2018-04-17 RX ADMIN — LISINOPRIL 20 MG: 20 TABLET ORAL at 09:47

## 2018-04-17 RX ADMIN — VANCOMYCIN HYDROCHLORIDE 250 MG: 1 INJECTION, POWDER, LYOPHILIZED, FOR SOLUTION INTRAVENOUS at 18:12

## 2018-04-17 RX ADMIN — VANCOMYCIN HYDROCHLORIDE 250 MG: 1 INJECTION, POWDER, LYOPHILIZED, FOR SOLUTION INTRAVENOUS at 23:31

## 2018-04-17 RX ADMIN — VANCOMYCIN HYDROCHLORIDE 250 MG: 1 INJECTION, POWDER, LYOPHILIZED, FOR SOLUTION INTRAVENOUS at 13:17

## 2018-04-17 RX ADMIN — OXYCODONE HYDROCHLORIDE AND ACETAMINOPHEN 2 TABLET: 5; 325 TABLET ORAL at 09:48

## 2018-04-17 RX ADMIN — GABAPENTIN 200 MG: 100 CAPSULE ORAL at 16:42

## 2018-04-17 RX ADMIN — KETOROLAC TROMETHAMINE 15 MG: 15 INJECTION, SOLUTION INTRAMUSCULAR; INTRAVENOUS at 23:31

## 2018-04-17 RX ADMIN — METRONIDAZOLE 500 MG: 250 TABLET ORAL at 21:31

## 2018-04-17 RX ADMIN — KETOROLAC TROMETHAMINE 15 MG: 15 INJECTION, SOLUTION INTRAMUSCULAR; INTRAVENOUS at 06:03

## 2018-04-17 RX ADMIN — KETOROLAC TROMETHAMINE 15 MG: 15 INJECTION, SOLUTION INTRAMUSCULAR; INTRAVENOUS at 13:17

## 2018-04-17 RX ADMIN — GABAPENTIN 200 MG: 100 CAPSULE ORAL at 21:32

## 2018-04-17 RX ADMIN — VANCOMYCIN HYDROCHLORIDE 250 MG: 1 INJECTION, POWDER, LYOPHILIZED, FOR SOLUTION INTRAVENOUS at 06:39

## 2018-04-17 RX ADMIN — MORPHINE SULFATE 2 MG: 4 INJECTION, SOLUTION INTRAMUSCULAR; INTRAVENOUS at 21:31

## 2018-04-17 RX ADMIN — SODIUM CHLORIDE 75 ML/HR: 900 INJECTION, SOLUTION INTRAVENOUS at 06:12

## 2018-04-17 RX ADMIN — MORPHINE SULFATE 4 MG: 4 INJECTION, SOLUTION INTRAMUSCULAR; INTRAVENOUS at 16:35

## 2018-04-17 RX ADMIN — ENOXAPARIN SODIUM 40 MG: 40 INJECTION SUBCUTANEOUS at 16:42

## 2018-04-17 RX ADMIN — KETOROLAC TROMETHAMINE 15 MG: 15 INJECTION, SOLUTION INTRAMUSCULAR; INTRAVENOUS at 18:12

## 2018-04-17 RX ADMIN — VERAPAMIL HYDROCHLORIDE 180 MG: 180 TABLET, FILM COATED, EXTENDED RELEASE ORAL at 21:31

## 2018-04-17 RX ADMIN — METRONIDAZOLE 500 MG: 250 TABLET ORAL at 06:03

## 2018-04-17 RX ADMIN — FUROSEMIDE 20 MG: 10 INJECTION, SOLUTION INTRAMUSCULAR; INTRAVENOUS at 16:37

## 2018-04-17 RX ADMIN — GABAPENTIN 200 MG: 100 CAPSULE ORAL at 09:47

## 2018-04-17 RX ADMIN — METRONIDAZOLE 500 MG: 250 TABLET ORAL at 13:29

## 2018-04-17 NOTE — WOUND CARE
Consult received for daily dressings for pt, \"partial open abd wound with moist ->dry gauze packing x 4. cover in sterile dressing. daily please\" per Dr. Annabel Woodard. Daily wet to drys are to be completed by bedside staff. Wound care remains available as needed during this hospitalization.

## 2018-04-17 NOTE — PROGRESS NOTES
0725 - Bedside shift change report given to Uriah Todd RN (oncoming nurse) by Nia Mejia RN (offgoing nurse). Report included the following information SBAR, Kardex, ED Summary, OR Summary, Intake/Output, MAR and Recent Results. Pt resting quietly in bed; appeared to be asleep prior to RN arrival.  Denies concerns/complaints. Pt states much more comfortable than on POD 1. Summary: Pt comfortable throughout shift. Denies n/v. Reports minimal pain. Tolerating oral abx and analgesia regimen well. Passed 1 episode liquid stool this afternoon. Patient Vitals for the past 12 hrs:   Temp Pulse Resp BP SpO2   04/17/18 1540 98.5 °F (36.9 °C) 72 20 132/72 93 %   04/17/18 0748 98.5 °F (36.9 °C) 65 20 119/67 93 %     1955 - Bedside shift change report given to Amando Jesus RN (oncoming nurse) by Uriah Todd RN (offgoing nurse). Report included the following information SBAR, Kardex, ED Summary, OR Summary, Intake/Output, MAR and Recent Results.

## 2018-04-17 NOTE — PROGRESS NOTES
Problem: Falls - Risk of  Goal: *Absence of Falls  Document [place] Fall Risk and appropriate interventions in the flowsheet.    Outcome: Progressing Towards Goal  Fall Risk Interventions:  Mobility Interventions: Patient to call before getting OOB, Communicate number of staff needed for ambulation/transfer         Medication Interventions: Patient to call before getting OOB, Evaluate medications/consider consulting pharmacy, Teach patient to arise slowly    Elimination Interventions: Call light in reach, Patient to call for help with toileting needs

## 2018-04-17 NOTE — PROGRESS NOTES
Surgery Post Operative Day Note    Sri Henning    Admit Date: 4/12/2018  Surgery Date:  [unfilled]  POD: 2 Days Post-Op    Subjective:      Patient present conditions: No significant medical complaints  Pain better controlled. No flatus. ambulating, hungry    Objective:     Patient Vitals for the past 24 hrs:   BP Temp Pulse Resp SpO2 Weight   04/16/18 2249 150/88 98.4 °F (36.9 °C) 85 16 95 % 91 kg (200 lb 9.6 oz)   04/16/18 2147 147/81 - 83 - - -   04/16/18 1931 135/70 98.7 °F (37.1 °C) 80 16 93 % -   04/16/18 1538 123/65 98.7 °F (37.1 °C) 81 18 98 % -   04/16/18 0755 132/63 98.9 °F (37.2 °C) 73 17 90 % -   04/16/18 0337 101/56 98.3 °F (36.8 °C) 68 18 94 % -   04/16/18 0031 124/70 98.6 °F (37 °C) 80 19 92 % 93.7 kg (206 lb 8 oz)      04/15 0701 - 04/16 1900  In: 2144 [P.O.:120;  I.V.:2024]  Out: 2070 [Urine:1850; Drains:220]  Recent Results (from the past 24 hour(s))   MAGNESIUM    Collection Time: 04/16/18  5:35 AM   Result Value Ref Range    Magnesium 2.2 1.6 - 2.6 mg/dL   METABOLIC PANEL, BASIC    Collection Time: 04/16/18  5:35 AM   Result Value Ref Range    Sodium 143 136 - 145 mmol/L    Potassium 3.5 3.5 - 5.5 mmol/L    Chloride 109 (H) 100 - 108 mmol/L    CO2 25 21 - 32 mmol/L    Anion gap 9 3.0 - 18 mmol/L    Glucose 91 74 - 99 mg/dL    BUN 8 7.0 - 18 MG/DL    Creatinine 0.72 0.6 - 1.3 MG/DL    BUN/Creatinine ratio 11 (L) 12 - 20      GFR est AA >60 >60 ml/min/1.73m2    GFR est non-AA >60 >60 ml/min/1.73m2    Calcium 8.2 (L) 8.5 - 10.1 MG/DL   CBC WITH AUTOMATED DIFF    Collection Time: 04/16/18  5:35 AM   Result Value Ref Range    WBC 10.8 4.6 - 13.2 K/uL    RBC 3.46 (L) 4.20 - 5.30 M/uL    HGB 9.6 (L) 12.0 - 16.0 g/dL    HCT 29.5 (L) 35.0 - 45.0 %    MCV 85.3 74.0 - 97.0 FL    MCH 27.7 24.0 - 34.0 PG    MCHC 32.5 31.0 - 37.0 g/dL    RDW 13.8 11.6 - 14.5 %    PLATELET 054 902 - 097 K/uL    MPV 8.7 (L) 9.2 - 11.8 FL    NEUTROPHILS 75 (H) 40 - 73 %    LYMPHOCYTES 17 (L) 21 - 52 %    MONOCYTES 8 3 - 10 % EOSINOPHILS 0 0 - 5 %    BASOPHILS 0 0 - 2 %    ABS. NEUTROPHILS 8.0 1.8 - 8.0 K/UL    ABS. LYMPHOCYTES 1.9 0.9 - 3.6 K/UL    ABS. MONOCYTES 0.9 0.05 - 1.2 K/UL    ABS. EOSINOPHILS 0.0 0.0 - 0.4 K/UL    ABS. BASOPHILS 0.0 0.0 - 0.06 K/UL    DF AUTOMATED         Abdomen:             Abdominal Assessment: Obese, Soft, Tender  Wound: drain with SS fluid L and R. Partial open wound with gauze packing intact    Status:                   Peripheral IV 04/13/18 Right Arm-Dressing Status: Clean, dry, & intact  Alessandro-Santana Drain 04/15/18 Left Abdomen-Dressing Status: Clean, dry, & intact  Alessandro-Santana Drain 04/15/18 Right Abdomen-Dressing Status: Clean, dry, & intact  Peripheral IV 04/14/18 Left Forearm-Dressing Status: Clean, dry, & intact  [REMOVED] Peripheral IV 04/12/18 Left Antecubital-Dressing Status: Clean, dry, & intact    Dressing Changes:Peripheral IV 04/13/18 Right Arm-Dressing Status: Clean, dry, & intact  Alessandro-Santana Drain 04/15/18 Left Abdomen-Dressing Status: Clean, dry, & intact  Alessandro-Santana Drain 04/15/18 Right Abdomen-Dressing Status: Clean, dry, & intact  Peripheral IV 04/14/18 Left Forearm-Dressing Status: Clean, dry, & intact  [REMOVED] Peripheral IV 04/12/18 Left Antecubital-Dressing Status: Clean, dry, & intact    Pain Level:                Nausea and Vomiting: minus    Stool: minus            Assessment:     Progress: adjust IV's or meds. Await bowel function    Plan:     Diet: clears advance as tolerated  Plan for continued care: adjust meds , mobilize and IS, lasix, DC drains, crackers ok. Wound care for dressing change tomorrow. agree with CDiff treatment otherwise no other post op antibiotics needed.       Signed By: Patricia Badillo DO

## 2018-04-17 NOTE — PROGRESS NOTES
Hospitalist Progress Note    Patient: Doris Marquez MRN: [id number]  CSN: [id number]    YOB: 1965  Age: 48 y.o.   Sex: female    DOA: 4/12/2018 LOS:  LOS: 5 days          Chief Complaint:    Perforated bowel      Assessment/Plan        Diverticulitis with perforation  S/p colon surgery, partial colectomy  c diff colitis  Hep C  Cirrhosis    Advanced to full liquid diet  PO tx for c diff-no diarrhea-would do 10 day total of tx  She is clinically stable    When surgery says she can d/c we can let her d/c home  Labs reviewed  Eating fine and no major pain control issues    Disposition :  Patient Active Problem List   Diagnosis Code    Chronic hepatitis C ([facility] B18.2    Cervical spondylarthritis M47.812    Carpal tunnel syndrome G56.00    Hypertension I10    H/O arthroscopic knee surgery Z98.890    WPW (Nahomy-Parkinson-White syndrome) I45.6    Cirrhosis ([facility] K74.60    Acute diverticulitis K57.92    Diverticulitis of sigmoid colon K57.32    Perforation of sigmoid colon due to diverticulitis K57.20    C. difficile colitis A04.72    Hypokalemia E87.6       Subjective:    Feeling ok  Tolerating full liquids this am  No BM yet  Denies uncontrolled pain in abd    Review of systems:    Constitutional: denies fevers, chills, myalgias  Respiratory: denies SOB  Cardiovascular: denies chest pain, palpitations  Gastrointestinal: denies nausea, vomiting      Vital signs/Intake and Output:  Visit Vitals    /67 (BP 1 Location: Right arm, BP Patient Position: At rest)    Pulse 65    Temp 98.5 °F (36.9 °C)    Resp 20    Ht 5' 5\" (1.651 m)    Wt 91 kg (200 lb 9.6 oz)    SpO2 93%    Breastfeeding No    BMI 33.38 kg/m2     Current Shift:  04/17 0701 - 04/17 1900  In: 527.8 [I.V.:527.8]  Out: -   Last three shifts:  04/15 1901 - 04/17 0700  In: 923.9 [P.O.:360; I.V.:563.9]  Out: 3095 [Urine:2850; Drains:245]    Exam:    General: Well developed, alert, NAD, OX3  Head/Neck: NCAT, supple, No masses, No lymphadenopathy  CVS:Regular rate and rhythm, no M/R/G, S1/S2 heard, no thrill  Lungs:Clear to auscultation bilaterally, no wheezes, rhonchi, or rales  Abdomen: Soft, few BS, NT, incision dressed  Extremities: No C/C/E, pulses palpable 2+  Neuro:grossly normal , follows commands  Psych:appropriate                Labs: Results:       Chemistry Recent Labs      04/17/18 0448 04/16/18   0535  04/15/18   0742   GLU  84  91  98   NA  142  143  144   K  3.5  3.5  4.6   CL  108  109*  107   CO2  29  25  24   BUN  6*  8  7   CREA  0.73  0.72  0.86   CA  7.8*  8.2*  8.5   AGAP  5  9  13   BUCR  8*  11*  8*      CBC w/Diff Recent Labs      04/17/18 0448 04/16/18   0535  04/15/18   0742   WBC  10.8  10.8  13.3*   RBC  3.34*  3.46*  4.40   HGB  9.3*  9.6*  12.6   HCT  28.7*  29.5*  38.1   PLT  166  151  171   GRANS  68  75*  89*   LYMPH  22  17*  6*   EOS  2  0  0      Cardiac Enzymes No results for input(s): CPK, CKND1, GHASSAN in the last 72 hours. No lab exists for component: CKRMB, TROIP   Coagulation No results for input(s): PTP, INR, APTT in the last 72 hours. No lab exists for component: INREXT    Lipid Panel No results found for: CHOL, CHOLPOCT, CHOLX, CHLST, CHOLV, 428983, HDL, LDL, LDLC, DLDLP, 683945, VLDLC, VLDL, TGLX, TRIGL, TRIGP, TGLPOCT, CHHD, CHHDX   BNP No results for input(s): BNPP in the last 72 hours. Liver Enzymes No results for input(s): TP, ALB, TBIL, AP, SGOT, GPT in the last 72 hours.     No lab exists for component: DBIL   Thyroid Studies No results found for: T4, T3U, TSH, TSHEXT     Procedures/imaging: see electronic medical records for all procedures/Xrays and details which were not copied into this note but were reviewed prior to creation of Peola Buzz, MD

## 2018-04-17 NOTE — ROUTINE PROCESS
Bedside and Verbal shift change report given to Yohana Jin RN (oncoming nurse) by Alex Blair RN (offgoing nurse). Report included the following information SBAR and Kardex.

## 2018-04-17 NOTE — ROUTINE PROCESS
Bedside and Verbal shift change report given to Aaron Ramírez RN (oncoming nurse) by Momo Bermudez RN (offgoing nurse). Report included the following information SBAR, Kardex, ED Summary, OR Summary, Procedure Summary, Intake/Output, MAR, Recent Results and Med Rec Status.

## 2018-04-17 NOTE — DISCHARGE SUMMARY
Discharge Summary    Patient: Neda Glover MRN: [id number]  CSN: [id number]    YOB: 1965  Age: 48 y.o. Sex: female    DOA: 4/3/2018 LOS:  LOS: 4 days   Discharge Date: 4/7/2018     Admission Diagnoses: Acute diverticulitis    Discharge Diagnoses:    Problem List as of 4/7/2018  Date Reviewed: 4/7/2018          Codes Class Noted - Resolved    Acute diverticulitis ICD-10-CM: K57.92  ICD-9-CM: 562.11  4/3/2018 - Present        Cirrhosis ([facility] ICD-10-CM: K74.60  ICD-9-CM: 571.5  2/29/2016 - Present        Chronic hepatitis C ([facility] ICD-10-CM: B18.2  ICD-9-CM: 070.54  9/16/2015 - Present        Cervical spondylarthritis ICD-10-CM: S90.975  ICD-9-CM: 721.0  9/16/2015 - Present        Carpal tunnel syndrome ICD-10-CM: G56.00  ICD-9-CM: 354.0  9/16/2015 - Present        Hypertension ICD-10-CM: I10  ICD-9-CM: 401.9  9/16/2015 - Present        H/O arthroscopic knee surgery ICD-10-CM: Z98.890  ICD-9-CM: V45.89  9/16/2015 - Present        WPW (Nahomy-Parkinson-White syndrome) ICD-10-CM: I45.6  ICD-9-CM: 426.7  9/16/2015 - Present    Overview Signed 9/16/2015 10:59 AM by Matteo Valentin MD     Treated with cardiac ablation                   Discharge Condition: Good    Hospital Course: IV abx for diverticulitis    Consults: None    Significant Diagnostic Studies: abd ct scan    Discharge Medications: This patient is currently admitted, however, discharge medications can only be displayed within the current admission encounter.       Activity: Activity as tolerated    Diet: Regular Diet    Wound Care: None needed    Follow-up: 1 week

## 2018-04-17 NOTE — ROUTINE PROCESS
Bedside and Verbal shift change report given to [address] (oncoming nurse) by Allegra Salinas RN (offgoing nurse). Report included the following information SBAR, Kardex, OR Summary, Procedure Summary, Intake/Output, MAR, Accordion and Recent Results.

## 2018-04-17 NOTE — PROGRESS NOTES
Bedside and Verbal shift change report given to Aurelio Keenan RN (oncoming nurse) by Srikanth Cain RN (offgoing nurse). Report included the following information SBAR and Kardex.

## 2018-04-17 NOTE — PROGRESS NOTES
Problem: Mobility Impaired (Adult and Pediatric)  Goal: *Acute Goals and Plan of Care (Insert Text)  Physical Therapy Goals  Initiated 4/16/2018 and to be accomplished within 7 day(s)  1. Patient will move from supine to sit and sit to supine  in bed with supervision/set-up. 2.  Patient will transfer from bed to chair and chair to bed with supervision/set-up using the least restrictive device. 3.  Patient will perform sit to stand with supervision/set-up. 4.  Patient will ambulate with supervision/set-up for >150 feet with the least restrictive device. 5.  Patient will ascend/descend a full flight of stairs with handrail(s) with minimal assistance/contact guard assist for safe home entry. Outcome: Progressing Towards Goal  physical Therapy TREATMENT    Patient: Yuliet Zaragoza (63 y.o. female)  Date: 4/17/2018  Diagnosis: Diverticulitis of sigmoid colon  ruptured diverticulum  Porforated diverticulum  Diverticulitis of sigmoid colon  Procedure(s) (LRB):  COLON RESECTION OPEN LOW ANTERIOR RESECTION, SPLEENIC FLEXTURE MOBILIZATION (N/A) 3 Days Post-Op  Precautions: Fall, Contact (Abdominal incision)   Chart, physical therapy assessment, plan of care and goals were reviewed. ASSESSMENT:  Pt ambulating 76' without AD with supervision. No LOB. No reports of dizziness. Cont POC. Progression toward goals:  [x]      Improving appropriately and progressing toward goals  []      Improving slowly and progressing toward goals  []      Not making progress toward goals and plan of care will be adjusted     PLAN:  Patient continues to benefit from skilled intervention to address the above impairments. Continue treatment per established plan of care.   Discharge Recommendations:  Home Health  Further Equipment Recommendations for Discharge:  N/A     SUBJECTIVE:   Patient stated  I've been getting up    OBJECTIVE DATA SUMMARY:   Critical Behavior:  Neurologic State: Alert  Orientation Level: Oriented X4  Cognition: Appropriate for age attention/concentration, Appropriate decision making, Appropriate safety awareness, Follows commands  Safety/Judgement: Awareness of environment  Functional Mobility Training:  Bed Mobility:  Rolling: Supervision  Supine to Sit: Supervision; Additional time  Scooting: Supervision  Transfers:  Sit to Stand: Stand-by assistance  Stand to Sit: Stand-by assistance  Balance:  Sitting: Intact  Standing: Intact  Ambulation/Gait Training:  Distance (ft): 75 Feet (ft)  Assistive Device: Gait belt  Ambulation - Level of Assistance: Supervision  Gait Abnormalities: Decreased step clearance  Base of Support: Widened  Speed/Tisha: Slow  Step Length: Left shortened;Right shortened  Interventions: Verbal cues    Pain:  Pain Scale 1: Numeric (0 - 10)  Pain Intensity 1: 2  Pain Location 1: Abdomen  Pain Orientation 1: Anterior  Pain Description 1: Aching  Pain Intervention(s) 1: Ambulation/Increased Activity; Distraction;Declines  Activity Tolerance:   Good     After treatment:   [] Patient left in no apparent distress sitting up in chair  [x] Patient left in no apparent distress in bed(EOB)   [x] Call bell left within reach  [] Nursing notified  [] Caregiver present  [] Bed alarm activated      Lori Todd PTA   Time Calculation: 14 mins

## 2018-04-17 NOTE — PROGRESS NOTES
Transition of Care (ABDULAZIZ) Plan: [facility] 4/18/18 vs 4/19/18    Noted pt is to be discharged with in the next 24-48 hours. Spoke with pt and discussed plan of care options to include [facility]. Pt is agreeable to [facility] upon discharge. Anticipate pt will be discharged with [facility]. No other plan of care needs have been identified. ABDULAZIZ Transportation:     How is patient being transported at discharge? Family     When? 4/18/18     Is transport scheduled? N/A    Follow-up appointment and transportation:     PCP/Specialist? See AVS     Time/Date? Who is transporting to the follow-up appointment? Pt/family      Is transport for follow up appointment scheduled? N/A    Communication plan (with patient/family): patient      Who is being called? Patient or Next of Kin? Responsible party? patient      What number(s) is to be used? See face sheet      What service provider is calling for [facility] services? N/A      When are they calling? N/A    Readmission Risk? (Green/Low; Yellow/Moderate; Red/High):  High    Care Management Interventions  PCP Verified by CM: Yes  Mode of Transport at Discharge:  Other (see comment) (Family/Friend)  Transition of Care Consult (CM Consult): [facility])  [address]: Yes  Health Maintenance Reviewed: Yes  Physical Therapy Consult: Yes  Occupational Therapy Consult: Yes  Current Support Network: Family Lives Nearby  Confirm Follow Up Transport: Self  Plan discussed with Pt/Family/Caregiver: Yes  Freedom of Choice Offered: Yes  Discharge Location  Discharge Placement: Home with [facility])

## 2018-04-17 NOTE — PROGRESS NOTES
2349-Resting quietly in bed with staff at bedside. Stable. 0208-Assessment complete. Abdominal dressing intact, underlying shadowing noted, with Mepilex at drain site to left and right lower abdomen with no drainage noted. 0600-Resting quietly in bed. Stable. No change from initial assessment. Shift Summary:  Uneventful shift. Rested quietly. Medicated per orders as needed. Stable.   No complaints at shift change

## 2018-04-18 ENCOUNTER — HOME HEALTH ADMISSION (OUTPATIENT)
Dept: HOME HEALTH SERVICES | Facility: HOME HEALTH | Age: 53
End: 2018-04-18
Payer: MEDICARE

## 2018-04-18 LAB
BACTERIA SPEC CULT: NORMAL
BACTERIA SPEC CULT: NORMAL
SERVICE CMNT-IMP: NORMAL
SERVICE CMNT-IMP: NORMAL

## 2018-04-18 PROCEDURE — 77030011256 HC DRSG MEPILEX <16IN NO BORD MOLN -A

## 2018-04-18 PROCEDURE — 74011250636 HC RX REV CODE- 250/636: Performed by: HOSPITALIST

## 2018-04-18 PROCEDURE — 74011250637 HC RX REV CODE- 250/637: Performed by: HOSPITALIST

## 2018-04-18 PROCEDURE — 97116 GAIT TRAINING THERAPY: CPT

## 2018-04-18 PROCEDURE — 74011250637 HC RX REV CODE- 250/637: Performed by: SURGERY

## 2018-04-18 PROCEDURE — 74011250636 HC RX REV CODE- 250/636: Performed by: SURGERY

## 2018-04-18 PROCEDURE — 65270000029 HC RM PRIVATE

## 2018-04-18 RX ADMIN — GABAPENTIN 200 MG: 100 CAPSULE ORAL at 09:56

## 2018-04-18 RX ADMIN — OXYCODONE HYDROCHLORIDE AND ACETAMINOPHEN 2 TABLET: 5; 325 TABLET ORAL at 22:17

## 2018-04-18 RX ADMIN — VANCOMYCIN HYDROCHLORIDE 250 MG: 1 INJECTION, POWDER, LYOPHILIZED, FOR SOLUTION INTRAVENOUS at 05:14

## 2018-04-18 RX ADMIN — METRONIDAZOLE 500 MG: 250 TABLET ORAL at 22:17

## 2018-04-18 RX ADMIN — OXYCODONE HYDROCHLORIDE AND ACETAMINOPHEN 2 TABLET: 5; 325 TABLET ORAL at 05:45

## 2018-04-18 RX ADMIN — VANCOMYCIN HYDROCHLORIDE 250 MG: 1 INJECTION, POWDER, LYOPHILIZED, FOR SOLUTION INTRAVENOUS at 14:52

## 2018-04-18 RX ADMIN — OXYCODONE HYDROCHLORIDE AND ACETAMINOPHEN 2 TABLET: 5; 325 TABLET ORAL at 11:30

## 2018-04-18 RX ADMIN — METRONIDAZOLE 500 MG: 250 TABLET ORAL at 05:14

## 2018-04-18 RX ADMIN — KETOROLAC TROMETHAMINE 15 MG: 15 INJECTION, SOLUTION INTRAMUSCULAR; INTRAVENOUS at 05:14

## 2018-04-18 RX ADMIN — ONDANSETRON 4 MG: 2 INJECTION INTRAMUSCULAR; INTRAVENOUS at 14:43

## 2018-04-18 RX ADMIN — VERAPAMIL HYDROCHLORIDE 180 MG: 180 TABLET, FILM COATED, EXTENDED RELEASE ORAL at 22:17

## 2018-04-18 RX ADMIN — KETOROLAC TROMETHAMINE 15 MG: 15 INJECTION, SOLUTION INTRAMUSCULAR; INTRAVENOUS at 11:30

## 2018-04-18 RX ADMIN — MORPHINE SULFATE 4 MG: 4 INJECTION, SOLUTION INTRAMUSCULAR; INTRAVENOUS at 17:20

## 2018-04-18 RX ADMIN — LISINOPRIL 20 MG: 20 TABLET ORAL at 11:29

## 2018-04-18 RX ADMIN — KETOROLAC TROMETHAMINE 15 MG: 15 INJECTION, SOLUTION INTRAMUSCULAR; INTRAVENOUS at 17:20

## 2018-04-18 RX ADMIN — METRONIDAZOLE 500 MG: 250 TABLET ORAL at 14:43

## 2018-04-18 RX ADMIN — GABAPENTIN 200 MG: 100 CAPSULE ORAL at 17:19

## 2018-04-18 RX ADMIN — VANCOMYCIN HYDROCHLORIDE 250 MG: 1 INJECTION, POWDER, LYOPHILIZED, FOR SOLUTION INTRAVENOUS at 18:35

## 2018-04-18 RX ADMIN — ENOXAPARIN SODIUM 40 MG: 40 INJECTION SUBCUTANEOUS at 14:44

## 2018-04-18 RX ADMIN — GABAPENTIN 200 MG: 100 CAPSULE ORAL at 22:17

## 2018-04-18 NOTE — PROGRESS NOTES
Problem: Mobility Impaired (Adult and Pediatric)  Goal: *Acute Goals and Plan of Care (Insert Text)  Physical Therapy Goals  Initiated 4/16/2018 and to be accomplished within 7 day(s)  1. Patient will move from supine to sit and sit to supine  in bed with supervision/set-up. 2.  Patient will transfer from bed to chair and chair to bed with supervision/set-up using the least restrictive device. 3.  Patient will perform sit to stand with supervision/set-up. 4.  Patient will ambulate with supervision/set-up for >150 feet with the least restrictive device. 5.  Patient will ascend/descend a full flight of stairs with handrail(s) with minimal assistance/contact guard assist for safe home entry. Outcome: Resolved/Met Date Met: 04/18/18  physical Therapy TREATMENT/DISCHARGE    Patient: Ata Alfaro (73 y.o. female)  Date: 4/18/2018  Diagnosis: Diverticulitis of sigmoid colon  ruptured diverticulum  Porforated diverticulum  Diverticulitis of sigmoid colon  Procedure(s) (LRB):  COLON RESECTION OPEN LOW ANTERIOR RESECTION, SPLEENIC FLEXTURE MOBILIZATION (N/A) 4 Days Post-Op  Precautions: Fall, Contact (Abdominal incision)  Chart, physical therapy assessment, plan of care and goals were reviewed. ASSESSMENT:  Pt showing continued progress with mobility and has met all goals for safe home mobility. Pt cleared from this level of PT. Progression toward goals:  [x]      Goals met  []      Improving appropriately and progressing toward goals  []      Improving slowly and progressing toward goals  []      Not making progress toward goals and plan of care will be adjusted     PLAN:  Patient will be discharged from physical therapy at this time.   Rationale for discharge:  [x] Goals Achieved  [] [address]  [] Patient not participating in therapy  [] Other:  Discharge Recommendations:  None  Further Equipment Recommendations for Discharge:  N/A     SUBJECTIVE:   Patient stated John Solanoyvrose been getting up by myself     OBJECTIVE DATA SUMMARY:   Critical Behavior:  Neurologic State: Alert  Orientation Level: Oriented X4  Cognition: Appropriate decision making, Appropriate safety awareness  Safety/Judgement: Awareness of environment  Functional Mobility Training:  Bed Mobility:  Sit to Supine: Supervision  Transfers:  Sit to Stand: Independent  Stand to Sit: Independent  Balance:  Sitting: Intact  Standing: Intact  Ambulation/Gait Training:  Distance (ft): 150 Feet (ft)  Assistive Device: Gait belt  Ambulation - Level of Assistance: Supervision  Gait Abnormalities: Decreased step clearance  Base of Support: Widened  Speed/Tisha: Slow  Step Length: Right shortened;Left shortened  Interventions: Verbal cues    Stairs:  Number of Stairs Trained: 10 (box step/ full flight )  Stairs - Level of Assistance: Supervision   Rail Use:  (RW)    Pain:  Pain Scale 1: Numeric (0 - 10)  Pain Intensity 1: 0  Activity Tolerance:   Good     After treatment:   [] Patient left in no apparent distress sitting up in chair  [x] Patient left in no apparent distress in bed  [x] Call bell left within reach  [] Nursing notified  [] Caregiver present  [] Bed alarm activated  Aubrey Lynn, RENEE   Time Calculation: 15 mins

## 2018-04-18 NOTE — ROUTINE PROCESS
Bedside and Verbal shift change report given to Dennie Castles, RN (oncoming nurse) by Jeff Lima RN (offgoing nurse). Report included the following information SBAR, Kardex, ED Summary, OR Summary, Procedure Summary, Intake/Output, MAR, Recent Results and Med Rec Status.

## 2018-04-18 NOTE — PROGRESS NOTES
Hospitalist Progress Note    Patient: Ata Alfaro MRN: [id number]  CSN: [id number]    YOB: 1965  Age: 48 y.o. Sex: female    DOA: 4/12/2018 LOS:  LOS: 6 days          Chief Complaint: diverticulitis          Assessment/Plan       Disposition :  Patient Active Problem List   Diagnosis Code    [facility] ([facility] B18.2    Cervical spondylarthritis M47.812    Carpal tunnel syndrome G56.00    Hypertension I10    H/O arthroscopic knee surgery Z98.890    WPW (Nahomy-Parkinson-White syndrome) I45.6    Cirrhosis ([facility] K74.60    Acute diverticulitis K57.92    Diverticulitis of sigmoid colon K57.32    Perforation of sigmoid colon due to diverticulitis K57.20    C. difficile colitis A04.72    Hypokalemia E87.6       49 y/o female admitted with diverticulitis with perforation. Reports overall improvement in status. Today eating full breakfast including eggs, bagel etc.  Still some abdominal pain though improving. Monitor how she responds to current diet. Looking pretty good to me. DC 24hrs? ?  Await comment from surgery. Will follow along today. D/w Dr. Domingo Roa. Working toward [employer] 4/18/2018. Subjective:    Feeling well. Eating \"real\" food today. Review of systems:    Constitutional: denies fevers, chills, myalgias  Respiratory: denies SOB, cough  Cardiovascular: denies chest pain, palpitations  Gastrointestinal: denies nausea, vomiting, diarrhea      Vital signs/Intake and Output:  Visit Vitals    /76 (BP 1 Location: Right arm, BP Patient Position: At rest)    Pulse 63    Temp 98.3 °F (36.8 °C)    Resp 18    Ht 5' 5\" (1.651 m)    Wt 95.1 kg (209 lb 9.6 oz)    SpO2 98%    Breastfeeding No    BMI 34.88 kg/m2     Current Shift:  04/18 0701 - 04/18 1900  In: 120 [P.O.:120]  Out: 250 [Urine:250]  Last three shifts:  04/16 1901 - 04/18 0700  In: 1831.7 [P.O.:740;  I.V.:1091.7]  Out: 2380 [Urine:2300; Drains:80]    Exam:    General: Well developed, alert, NAD, OX3  Head/Neck:mmm  CVS:s1s2 rrr  Lungs:CTABL  Abdomen: Soft, bs+  Extremities: No edema. Labs: Results:       Chemistry Recent Labs      04/17/18   0448  04/16/18   0535   GLU  84  91   NA  142  143   K  3.5  3.5   CL  108  109*   CO2  29  25   BUN  6*  8   CREA  0.73  0.72   CA  7.8*  8.2*   AGAP  5  9   BUCR  8*  11*      CBC w/Diff Recent Labs      04/17/18 0448  04/16/18   0535   WBC  10.8  10.8   RBC  3.34*  3.46*   HGB  9.3*  9.6*   HCT  28.7*  29.5*   PLT  166  151   GRANS  68  75*   LYMPH  22  17*   EOS  2  0      Cardiac Enzymes No results for input(s): CPK, CKND1, GHASSAN in the last 72 hours. No lab exists for component: CKRMB, TROIP   Coagulation No results for input(s): PTP, INR, APTT in the last 72 hours. No lab exists for component: INREXT    Lipid Panel No results found for: CHOL, CHOLPOCT, CHOLX, CHLST, CHOLV, 624187, HDL, LDL, LDLC, DLDLP, 563940, VLDLC, VLDL, TGLX, TRIGL, TRIGP, TGLPOCT, CHHD, CHHDX   BNP No results for input(s): BNPP in the last 72 hours. Liver Enzymes No results for input(s): TP, ALB, TBIL, AP, SGOT, GPT in the last 72 hours.     No lab exists for component: DBIL   Thyroid Studies No results found for: T4, T3U, TSH, TSHEXT     Procedures/imaging: see electronic medical records for all procedures/Xrays and details which were not copied into this note but were reviewed prior to creation of Gómez Georges MD

## 2018-04-18 NOTE — PROGRESS NOTES
Surgery Post Operative Day Note    Julisa Clayton    Admit Date: 4/12/2018  Surgery Date:  [unfilled]  POD: 3 Days Post-Op    Subjective:      Patient present conditions: No significant medical complaints    Objective:     Patient Vitals for the past 24 hrs:   BP Temp Pulse Resp SpO2 Weight   04/17/18 2017 165/81 98.1 °F (36.7 °C) 76 18 94 % -   04/17/18 1540 132/72 98.5 °F (36.9 °C) 72 20 93 % -   04/17/18 0748 119/67 98.5 °F (36.9 °C) 65 20 93 % -   04/17/18 0331 119/62 98.5 °F (36.9 °C) 61 18 91 % -   04/16/18 2249 150/88 98.4 °F (36.9 °C) 85 16 95 % 91 kg (200 lb 9.6 oz)   04/16/18 2147 147/81 - 83 - - -      04/16 0701 - 04/17 1900  In: 1951.7 [P.O.:860; I.V.:1091.7]  Out: 2239 [Urine:2650; Drains:180]  Recent Results (from the past 24 hour(s))   MAGNESIUM    Collection Time: 04/17/18  4:48 AM   Result Value Ref Range    Magnesium 1.7 1.6 - 2.6 mg/dL   METABOLIC PANEL, BASIC    Collection Time: 04/17/18  4:48 AM   Result Value Ref Range    Sodium 142 136 - 145 mmol/L    Potassium 3.5 3.5 - 5.5 mmol/L    Chloride 108 100 - 108 mmol/L    CO2 29 21 - 32 mmol/L    Anion gap 5 3.0 - 18 mmol/L    Glucose 84 74 - 99 mg/dL    BUN 6 (L) 7.0 - 18 MG/DL    Creatinine 0.73 0.6 - 1.3 MG/DL    BUN/Creatinine ratio 8 (L) 12 - 20      GFR est AA >60 >60 ml/min/1.73m2    GFR est non-AA >60 >60 ml/min/1.73m2    Calcium 7.8 (L) 8.5 - 10.1 MG/DL   CBC WITH AUTOMATED DIFF    Collection Time: 04/17/18  4:48 AM   Result Value Ref Range    WBC 10.8 4.6 - 13.2 K/uL    RBC 3.34 (L) 4.20 - 5.30 M/uL    HGB 9.3 (L) 12.0 - 16.0 g/dL    HCT 28.7 (L) 35.0 - 45.0 %    MCV 85.9 74.0 - 97.0 FL    MCH 27.8 24.0 - 34.0 PG    MCHC 32.4 31.0 - 37.0 g/dL    RDW 13.9 11.6 - 14.5 %    PLATELET 184 310 - 249 K/uL    MPV 9.0 (L) 9.2 - 11.8 FL    NEUTROPHILS 68 42 - 75 %    LYMPHOCYTES 22 20 - 51 %    MONOCYTES 8 2 - 9 %    EOSINOPHILS 2 0 - 5 %    BASOPHILS 0 0 - 3 %    ABS. NEUTROPHILS 7.3 1.8 - 8.0 K/UL    ABS. LYMPHOCYTES 2.4 0.8 - 3.5 K/UL    ABS. MONOCYTES 0.9 0 - 1.0 K/UL    ABS. EOSINOPHILS 0.2 0.0 - 0.4 K/UL    ABS. BASOPHILS 0.0 0.0 - 0.1 K/UL    RBC COMMENTS NORMOCYTIC, NORMOCHROMIC      WBC COMMENTS REACTIVE LYMPHS      DF MANUAL         Abdomen:             Abdominal Assessment: Soft, Tender, Passing flatus  Wound:     Status:                   Peripheral IV 04/13/18 Right Arm-Dressing Status: Clean, dry, & intact  [REMOVED] Alessandro-Santana Drain 04/15/18 Left Abdomen-Dressing Status: Clean, dry, & intact  [REMOVED] Alessandro-Santana Drain 04/15/18 Right Abdomen-Dressing Status: Clean, dry, & intact  Peripheral IV 04/14/18 Left Forearm-Dressing Status: Clean, dry, & intact  [REMOVED] Peripheral IV 04/12/18 Left Antecubital-Dressing Status: Clean, dry, & intact    Dressing Changes:Peripheral IV 04/13/18 Right Arm-Dressing Status: Clean, dry, & intact  [REMOVED] Alessandro-Santana Drain 04/15/18 Left Abdomen-Dressing Status: Clean, dry, & intact  [REMOVED] Alessandro-Santana Drain 04/15/18 Right Abdomen-Dressing Status: Clean, dry, & intact  Peripheral IV 04/14/18 Left Forearm-Dressing Status: Clean, dry, & intact  [REMOVED] Peripheral IV 04/12/18 Left Antecubital-Dressing Status: Clean, dry, & intact    Pain Level:                Nausea and Vomiting: minus    Stool: plus            Assessment:     Progress: good , wound packing changed. Healthy granulating tissue with intermittant staples intact.     Plan:     Diet: regular  Plan for continued care: mobilize and IS, DVT proph, Cdiff treatment per medicine, if tolerating regular diet likely DC tomorrow or Thursday      Signed By: Thony Luna DO

## 2018-04-18 NOTE — PROGRESS NOTES
0735-received report from [address] [place] [address] included SBAR MAR and Kardex. 1800-seen by Dr Ade Antoine  Shift Summary- no change in pt status  Bedside and Verbal shift change report given to Charanjit Painter RN (oncoming nurse) by Ramón Messer RN (offgoing nurse). Report included the following information SBAR, Kardex and MAR.

## 2018-04-19 VITALS
SYSTOLIC BLOOD PRESSURE: 161 MMHG | TEMPERATURE: 98.1 F | DIASTOLIC BLOOD PRESSURE: 77 MMHG | HEIGHT: 65 IN | OXYGEN SATURATION: 97 % | HEART RATE: 78 BPM | WEIGHT: 209.6 LBS | RESPIRATION RATE: 16 BRPM | BODY MASS INDEX: 34.92 KG/M2

## 2018-04-19 LAB
ANION GAP SERPL CALC-SCNC: 8 MMOL/L (ref 3–18)
BUN SERPL-MCNC: 3 MG/DL (ref 7–18)
BUN/CREAT SERPL: 3 (ref 12–20)
CALCIUM SERPL-MCNC: 8.1 MG/DL (ref 8.5–10.1)
CHLORIDE SERPL-SCNC: 104 MMOL/L (ref 100–108)
CO2 SERPL-SCNC: 29 MMOL/L (ref 21–32)
CREAT SERPL-MCNC: 0.87 MG/DL (ref 0.6–1.3)
ERYTHROCYTE [DISTWIDTH] IN BLOOD BY AUTOMATED COUNT: 13.6 % (ref 11.6–14.5)
GLUCOSE SERPL-MCNC: 112 MG/DL (ref 74–99)
HCT VFR BLD AUTO: 28.8 % (ref 35–45)
HGB BLD-MCNC: 9.4 G/DL (ref 12–16)
MCH RBC QN AUTO: 27.9 PG (ref 24–34)
MCHC RBC AUTO-ENTMCNC: 32.6 G/DL (ref 31–37)
MCV RBC AUTO: 85.5 FL (ref 74–97)
PLATELET # BLD AUTO: 201 K/UL (ref 135–420)
PMV BLD AUTO: 8.7 FL (ref 9.2–11.8)
POTASSIUM SERPL-SCNC: 3 MMOL/L (ref 3.5–5.5)
RBC # BLD AUTO: 3.37 M/UL (ref 4.2–5.3)
SODIUM SERPL-SCNC: 141 MMOL/L (ref 136–145)
WBC # BLD AUTO: 8.1 K/UL (ref 4.6–13.2)

## 2018-04-19 PROCEDURE — 74011250636 HC RX REV CODE- 250/636: Performed by: HOSPITALIST

## 2018-04-19 PROCEDURE — 36415 COLL VENOUS BLD VENIPUNCTURE: CPT | Performed by: INTERNAL MEDICINE

## 2018-04-19 PROCEDURE — 74011250636 HC RX REV CODE- 250/636: Performed by: SURGERY

## 2018-04-19 PROCEDURE — 85027 COMPLETE CBC AUTOMATED: CPT | Performed by: INTERNAL MEDICINE

## 2018-04-19 PROCEDURE — 77030011256 HC DRSG MEPILEX <16IN NO BORD MOLN -A

## 2018-04-19 PROCEDURE — 74011250637 HC RX REV CODE- 250/637: Performed by: SURGERY

## 2018-04-19 PROCEDURE — 74011250637 HC RX REV CODE- 250/637: Performed by: HOSPITALIST

## 2018-04-19 PROCEDURE — 80048 BASIC METABOLIC PNL TOTAL CA: CPT | Performed by: INTERNAL MEDICINE

## 2018-04-19 RX ORDER — NYSTATIN 100000 [USP'U]/G
POWDER TOPICAL 2 TIMES DAILY
Qty: 1 BOTTLE | Refills: 1 | Status: SHIPPED | OUTPATIENT
Start: 2018-04-19 | End: 2018-09-29

## 2018-04-19 RX ORDER — POTASSIUM CHLORIDE 20 MEQ/1
40 TABLET, EXTENDED RELEASE ORAL 2 TIMES DAILY
Status: DISCONTINUED | OUTPATIENT
Start: 2018-04-19 | End: 2018-04-19 | Stop reason: HOSPADM

## 2018-04-19 RX ORDER — NYSTATIN 100000 [USP'U]/G
POWDER TOPICAL 2 TIMES DAILY
Status: DISCONTINUED | OUTPATIENT
Start: 2018-04-19 | End: 2018-04-19 | Stop reason: HOSPADM

## 2018-04-19 RX ORDER — FLUCONAZOLE 100 MG/1
200 TABLET ORAL DAILY
Status: DISCONTINUED | OUTPATIENT
Start: 2018-04-19 | End: 2018-04-19 | Stop reason: HOSPADM

## 2018-04-19 RX ORDER — OXYCODONE AND ACETAMINOPHEN 5; 325 MG/1; MG/1
1 TABLET ORAL
Qty: 15 TAB | Refills: 0 | Status: SHIPPED | OUTPATIENT
Start: 2018-04-19 | End: 2018-09-29

## 2018-04-19 RX ORDER — POTASSIUM CHLORIDE 20 MEQ/1
40 TABLET, EXTENDED RELEASE ORAL DAILY
Qty: 5 TAB | Refills: 0 | Status: SHIPPED | OUTPATIENT
Start: 2018-04-19 | End: 2018-09-29

## 2018-04-19 RX ORDER — FLUCONAZOLE 100 MG/1
100 TABLET ORAL DAILY
Qty: 3 TAB | Refills: 0 | Status: SHIPPED | OUTPATIENT
Start: 2018-04-20 | End: 2018-04-23

## 2018-04-19 RX ORDER — METRONIDAZOLE 500 MG/1
500 TABLET ORAL 3 TIMES DAILY
Qty: 21 TAB | Refills: 0 | Status: SHIPPED | OUTPATIENT
Start: 2018-04-19 | End: 2018-04-26

## 2018-04-19 RX ADMIN — VANCOMYCIN HYDROCHLORIDE 250 MG: 1 INJECTION, POWDER, LYOPHILIZED, FOR SOLUTION INTRAVENOUS at 12:53

## 2018-04-19 RX ADMIN — OXYCODONE HYDROCHLORIDE AND ACETAMINOPHEN 2 TABLET: 5; 325 TABLET ORAL at 11:35

## 2018-04-19 RX ADMIN — KETOROLAC TROMETHAMINE 15 MG: 15 INJECTION, SOLUTION INTRAMUSCULAR; INTRAVENOUS at 11:35

## 2018-04-19 RX ADMIN — OXYCODONE HYDROCHLORIDE AND ACETAMINOPHEN 2 TABLET: 5; 325 TABLET ORAL at 18:32

## 2018-04-19 RX ADMIN — KETOROLAC TROMETHAMINE 15 MG: 15 INJECTION, SOLUTION INTRAMUSCULAR; INTRAVENOUS at 07:20

## 2018-04-19 RX ADMIN — FLUCONAZOLE 200 MG: 100 TABLET ORAL at 12:53

## 2018-04-19 RX ADMIN — KETOROLAC TROMETHAMINE 15 MG: 15 INJECTION, SOLUTION INTRAMUSCULAR; INTRAVENOUS at 01:55

## 2018-04-19 RX ADMIN — VANCOMYCIN HYDROCHLORIDE 250 MG: 1 INJECTION, POWDER, LYOPHILIZED, FOR SOLUTION INTRAVENOUS at 07:20

## 2018-04-19 RX ADMIN — GABAPENTIN 200 MG: 100 CAPSULE ORAL at 09:40

## 2018-04-19 RX ADMIN — NYSTATIN: 100000 POWDER TOPICAL at 12:53

## 2018-04-19 RX ADMIN — GABAPENTIN 200 MG: 100 CAPSULE ORAL at 17:12

## 2018-04-19 RX ADMIN — VANCOMYCIN HYDROCHLORIDE 250 MG: 1 INJECTION, POWDER, LYOPHILIZED, FOR SOLUTION INTRAVENOUS at 01:55

## 2018-04-19 RX ADMIN — METRONIDAZOLE 500 MG: 250 TABLET ORAL at 14:44

## 2018-04-19 RX ADMIN — LISINOPRIL 20 MG: 20 TABLET ORAL at 09:40

## 2018-04-19 RX ADMIN — ENOXAPARIN SODIUM 40 MG: 40 INJECTION SUBCUTANEOUS at 14:44

## 2018-04-19 RX ADMIN — VANCOMYCIN HYDROCHLORIDE 250 MG: 1 INJECTION, POWDER, LYOPHILIZED, FOR SOLUTION INTRAVENOUS at 18:33

## 2018-04-19 RX ADMIN — KETOROLAC TROMETHAMINE 15 MG: 15 INJECTION, SOLUTION INTRAMUSCULAR; INTRAVENOUS at 17:12

## 2018-04-19 RX ADMIN — METRONIDAZOLE 500 MG: 250 TABLET ORAL at 05:13

## 2018-04-19 RX ADMIN — OXYCODONE HYDROCHLORIDE AND ACETAMINOPHEN 2 TABLET: 5; 325 TABLET ORAL at 05:13

## 2018-04-19 NOTE — PROGRESS NOTES
Chart reviewed. Attended IDRs with primary RN Asher Zuleta and MD Radha Cleaning. Per MD Radha Cleaning, pt will likely dc today, pending clearance from MD Iris Alonso. Pt offered foc for home health. Pt chose [address] [place] [address]. Guille Urena will assist with referral.      Care Management Interventions  PCP Verified by CM: Yes  Mode of Transport at Discharge:  Other (see comment) (Family/Friend)  Transition of Care Consult (CM Consult): [address]: Yes  Health Maintenance Reviewed: Yes  Physical Therapy Consult: Yes  Occupational Therapy Consult: Yes  Current Support Network: Family Lives Nearby  Confirm Follow Up Transport: Self  Plan discussed with Pt/Family/Caregiver: Yes  Freedom of Choice Offered: Yes  Discharge Location  Discharge Placement: Home with home health

## 2018-04-19 NOTE — PROGRESS NOTES
Surgery Post Operative Day Note    Jac Hong    Admit Date: 4/12/2018  Surgery Date:  [unfilled]  POD: 4 Days Post-Op    Subjective:      Patient present conditions: distension and discomfort    Objective:     Patient Vitals for the past 24 hrs:   BP Temp Pulse Resp SpO2 Weight   04/18/18 1943 155/88 98 °F (36.7 °C) 64 18 95 % -   04/18/18 1549 150/90 98.1 °F (36.7 °C) 75 18 98 % -   04/18/18 1159 139/74 98.4 °F (36.9 °C) 72 18 96 % -   04/18/18 0838 140/76 98.3 °F (36.8 °C) 63 18 98 % -   04/18/18 0413 122/68 98.7 °F (37.1 °C) 66 18 94 % -   04/17/18 2346 141/85 99.1 °F (37.3 °C) 70 20 96 % 95.1 kg (209 lb 9.6 oz)      04/17 0701 - 04/18 1900  In: 1147.8 [P.O.:620; I.V.:527.8]  Out: 1050 [Urine:1050]  No results found for this or any previous visit (from the past 24 hour(s)).     Abdomen:             Abdominal Assessment: Soft, Tender distension - more than yesterday  Wound: CDI with packing    Status:                   [REMOVED] Peripheral IV 04/13/18 Right Arm-Dressing Status: Clean, dry, & intact  [REMOVED] Alessandro-Santana Drain 04/15/18 Left Abdomen-Dressing Status: Clean, dry, & intact  [REMOVED] Alessandro-Santana Drain 04/15/18 Right Abdomen-Dressing Status: Clean, dry, & intact  Peripheral IV 04/14/18 Left Forearm-Dressing Status: Clean, dry, & intact  [REMOVED] Peripheral IV 04/12/18 Left Antecubital-Dressing Status: Clean, dry, & intact    Dressing Changes:[REMOVED] Peripheral IV 04/13/18 Right Arm-Dressing Status: Clean, dry, & intact  [REMOVED] Alessandro-Santana Drain 04/15/18 Left Abdomen-Dressing Status: Clean, dry, & intact  [REMOVED] Alessandro-Santana Drain 04/15/18 Right Abdomen-Dressing Status: Clean, dry, & intact  Peripheral IV 04/14/18 Left Forearm-Dressing Status: Clean, dry, & intact  [REMOVED] Peripheral IV 04/12/18 Left Antecubital-Dressing Status: Clean, dry, & intact    Pain Level:                Nausea and Vomiting: minus    Stool: plus            Assessment:     Progress: discomfort - maybe due to Cdiff infection? otherwise bowel seems to be functioning    Plan:     Diet: regular as tolerated  Plan for continued care: continue RX as is and post op course complicated by pre-op active cdiff infection. Continue treatment.         Signed By: Bhavin Anderson, DO

## 2018-04-19 NOTE — PROGRESS NOTES
0740-received report from Basia Bhatti RN included CASI DICKSON and Aubrey. 1815-discharged to home, instructions reviewed and understood by pt,  prescriptions given.

## 2018-04-19 NOTE — DISCHARGE INSTRUCTIONS
Post-Operative Discharge Instructions  Robert Rajput DO, [address], [facility]  [address] [place] [address]  [phone]    Patient: Jac Hong MRN: [id number]  CSN: [id number]    YOB: 1965  Age: 48 y.o. Sex: female    DOA: 4/12/2018 LOS:  LOS: 7 days   Discharge Date:      Acute Diagnoses:  Diverticulitis of sigmoid colon  ruptured diverticulum  Porforated diverticulum     Chronic Medical Diagnoses:  Problem List as of 4/19/2018  Date Reviewed: 4/14/2018          Codes Class Noted - Resolved    C. difficile colitis ICD-10-CM: A04.72  ICD-9-CM: 008.45  4/13/2018 - Present        Hypokalemia ICD-10-CM: E87.6  ICD-9-CM: 276.8  4/13/2018 - Present        * (Principal)Diverticulitis of sigmoid colon ICD-10-CM: K57.32  ICD-9-CM: 562.11  4/12/2018 - Present        Perforation of sigmoid colon due to diverticulitis ICD-10-CM: K57.20  ICD-9-CM: 562.11  4/12/2018 - Present        Acute diverticulitis ICD-10-CM: K57.92  ICD-9-CM: 562.11  4/3/2018 - Present        Cirrhosis ([facility] ICD-10-CM: K74.60  ICD-9-CM: 571.5  2/29/2016 - Present        Chronic hepatitis C ([facility] ICD-10-CM: B18.2  ICD-9-CM: 070.54  9/16/2015 - Present        Cervical spondylarthritis ICD-10-CM: R50.040  ICD-9-CM: 721.0  9/16/2015 - Present        Carpal tunnel syndrome ICD-10-CM: G56.00  ICD-9-CM: 354.0  9/16/2015 - Present        Hypertension ICD-10-CM: I10  ICD-9-CM: 401.9  9/16/2015 - Present        H/O arthroscopic knee surgery ICD-10-CM: Z98.890  ICD-9-CM: V45.89  9/16/2015 - Present        WPW (Nahomy-Parkinson-White syndrome) ICD-10-CM: I45.6  ICD-9-CM: 426.7  9/16/2015 - Present    Overview Signed 9/16/2015 10:59 AM by Christina Go MD     Treated with cardiac ablation                   Diet  1. Resume prior to surgery diet as tolerated. Activity  1. Do not drive a car or operate any hazardous machinery the day of surgery. 2. Rest quietly today.   3. No bending or heavy lifting. 4. You may resume other prior to surgery activities as tolerated. 5. You may remove the bandage and shower in 1 day. Drain / Wound Care  1. Follow all drain / wound care instructions exactly as explained by the Nurse at time of discharge. 2. Apply an ice pack to the surgical site for 48 hours. 3. Do not put any salves or ointments on the wound. Allow it to form a dry scab. 4. Leave steri-strips / Dermabond alone. They should be allowed to fall off on their own in 7-14 days. Medications  1. It is important to take your medications exactly as they are prescribed. 2. Keep your medication in the bottles provided by the pharmacist, and keep a list of the medication names, dosages, and times they should be taken in your wallet. Call 911 anytime you think you may need emergency care. For example, call if:  · You passed out (lost consciousness). · You have severe trouble breathing. · You have sudden chest pain and shortness of breath. Notify your Surgeon for any of the followin. Fever, chills, nausea, vomiting, severe abdominal pain or bleeding. 2. If you experience any redness or discharge or sign of infection. 3. Persistent nausea lasting more than 24 hours. If you are unable to reach your Surgeon for any of the symptoms above, you should proceed directly to the nearest Emergency Department. Post-Operative Appointment Information    Call Dr. Jg Luna office tomorrow morning at [phone] to schedule a post-operative office visit in two (2) weeks. If any questions or concerns arise, call your Surgeon at [phone].

## 2018-04-19 NOTE — ROUTINE PROCESS
Bedside and Verbal shift change report given to ALFONZO Olmos RN (oncoming nurse) by Nate Myrick RN   (offgoing nurse). Report included the following information SBAR, Kardex, Procedure Summary and MAR.

## 2018-04-19 NOTE — PROGRESS NOTES
Shift summary: No acute events. Pt c/o moderate abdominal pain relieved with toradol and percocet. Abdomen distended. Had loose stools. Stated she did not pass flatus, encouraged ambulation. Mepliex dressing to old left SEYMOUR drain site saturated, changed dressing.

## 2018-04-19 NOTE — PROGRESS NOTES
OK to discharge. Doing well. Eating and having BM.  Pt has f/u with me    Dickson Mesa,   4/19/2018  5:33 PM

## 2018-04-20 ENCOUNTER — HOME CARE VISIT (OUTPATIENT)
Dept: SCHEDULING | Facility: HOME HEALTH | Age: 53
End: 2018-04-20
Payer: MEDICARE

## 2018-04-20 PROCEDURE — G0299 HHS/HOSPICE OF RN EA 15 MIN: HCPCS

## 2018-04-20 PROCEDURE — 3331090002 HH PPS REVENUE DEBIT

## 2018-04-20 PROCEDURE — 3331090001 HH PPS REVENUE CREDIT

## 2018-04-20 PROCEDURE — 400013 HH SOC

## 2018-04-20 NOTE — DISCHARGE SUMMARY
[address]  MR#: [id number]  : 1965  ACCOUNT #: [de-identified]   ADMIT DATE: 2018  DISCHARGE DATE: 2018    DIAGNOSES AT DISCHARGE:    1.  Microperforation of the bowel with diverticulitis. 2.  Clostridium difficile colitis. 3.  History of hepatitis C.    4.  Nahomy-Parkinson-White syndrome. 5.  History of cirrhosis. 6.  Hypokalemia. HOSPITAL SUMMARY:  This 63-year-old -American female was admitted to the hospital with diverticulitis with perforation. She has been improving nicely. She underwent surgery with Dr. Paulette Castellanos. She did a colon resection with open low anterior resection and splenic flexure mobilization for perforated diverticulitis on 04/15. She has been recovering nicely the last few days. Two days ago, she started having bowel movements; they have been stable. No evidence for diarrhea. She is C. diff positive per stool test from . She has been on appropriate treatment for that. There is no evidence for watery diarrhea. She has no leukocytosis. She is stable with a hemoglobin of 9.4. White count is normal as is her platelets. Other stool culture came back with a few yeast.  She is complaining of a yeast infection, on antibiotics here. We started Diflucan and nystatin powder. Otherwise, she is doing well. She is on a regular diet, essentially eating small amounts on a regular basis with no nausea or vomiting, and no worsening abdominal pain. She describes abdominal tenderness in her incision, but no uncontrolled pain. She is up and ambulatory in the room. She is tolerating her oral medications. At this point, she is stable to discharge from the hospital as long as surgery allows that clearance. We recommend to follow with Dr. Paulette Castellanos , next Thursday, as scheduled already. She has an incision that will need home care and that is being arranged as well.     PHYSICAL EXAMINATION:  GENERAL:  Today she is awake and alert, in no acute distress, mentating appropriately, resting comfortably in the bed, talking with her friend. VITAL SIGNS:  Blood pressure is 148/74, pulse 68, temp 98.2, respiratory rate 16, SpO2 100% on room air. ABDOMEN:  Soft, mildly distended with good bowel sounds. Incision is dressed, clean, dry. Small packing at the top part, but otherwise intact. LUNGS:  Clear. CARDIOVASCULAR:  Regular rate and rhythm. EXTREMITIES:  Lower extremities without edema. LABORATORY DATA:  Her last metabolic profile showed potassium low at 3.0 and so we are giving her oral potassium, which we will continue on discharge as well. MEDICATIONS:  Discharged home today on Diflucan 100 mg daily for 3 days, gabapentin 300 mg every morning, lisinopril 20 mg daily, Flagyl 500 mg 3 times daily for 7 days, nystatin powder twice daily to the vaginal area for a week, Percocet 1 tablet every 4 hours as needed for severe pain #15, K-Dur 40 mEq daily for 5 days. Vancomycin 5 mL every 6 hours for 7 days, and verapamil 180 mg nightly. FOLLOWUP:  Follow up with Dr. Bruce May next week on 04/26. Home health is being arranged as well. Dr. Reese, her PCP, on 04/27. DISCHARGE TIME:  Forty minutes on discharge time.     DISPOSITION:  home      MD SCOUT Mock/PRINCE  D: 04/19/2018 13:55     T: 04/20/2018 10:42  JOB #: [id number]

## 2018-04-21 ENCOUNTER — HOME CARE VISIT (OUTPATIENT)
Dept: SCHEDULING | Facility: HOME HEALTH | Age: 53
End: 2018-04-21
Payer: MEDICARE

## 2018-04-21 VITALS
SYSTOLIC BLOOD PRESSURE: 126 MMHG | DIASTOLIC BLOOD PRESSURE: 79 MMHG | RESPIRATION RATE: 14 BRPM | OXYGEN SATURATION: 96 % | HEART RATE: 79 BPM | TEMPERATURE: 97.9 F

## 2018-04-21 PROCEDURE — 3331090001 HH PPS REVENUE CREDIT

## 2018-04-21 PROCEDURE — 3331090002 HH PPS REVENUE DEBIT

## 2018-04-21 PROCEDURE — G0299 HHS/HOSPICE OF RN EA 15 MIN: HCPCS

## 2018-04-22 PROCEDURE — 3331090001 HH PPS REVENUE CREDIT

## 2018-04-22 PROCEDURE — 3331090002 HH PPS REVENUE DEBIT

## 2018-04-23 ENCOUNTER — HOME CARE VISIT (OUTPATIENT)
Dept: HOME HEALTH SERVICES | Facility: HOME HEALTH | Age: 53
End: 2018-04-23
Payer: MEDICARE

## 2018-04-23 PROCEDURE — 3331090001 HH PPS REVENUE CREDIT

## 2018-04-23 PROCEDURE — 3331090002 HH PPS REVENUE DEBIT

## 2018-04-24 ENCOUNTER — HOME CARE VISIT (OUTPATIENT)
Dept: HOME HEALTH SERVICES | Facility: HOME HEALTH | Age: 53
End: 2018-04-24
Payer: MEDICARE

## 2018-04-24 ENCOUNTER — HOME CARE VISIT (OUTPATIENT)
Dept: SCHEDULING | Facility: HOME HEALTH | Age: 53
End: 2018-04-24
Payer: MEDICARE

## 2018-04-24 VITALS
HEART RATE: 71 BPM | RESPIRATION RATE: 16 BRPM | TEMPERATURE: 97.3 F | OXYGEN SATURATION: 97 % | SYSTOLIC BLOOD PRESSURE: 130 MMHG | DIASTOLIC BLOOD PRESSURE: 78 MMHG

## 2018-04-24 PROCEDURE — A6252 ABSORPT DRG >16 <=48 W/O BDR: HCPCS

## 2018-04-24 PROCEDURE — G0495 RN CARE TRAIN/EDU IN HH: HCPCS

## 2018-04-24 PROCEDURE — 3331090002 HH PPS REVENUE DEBIT

## 2018-04-24 PROCEDURE — A6255 ABSORPT DRG >16<=48 IN W/BDR: HCPCS

## 2018-04-24 PROCEDURE — 3331090001 HH PPS REVENUE CREDIT

## 2018-04-24 PROCEDURE — A4216 STERILE WATER/SALINE, 10 ML: HCPCS

## 2018-04-25 VITALS
RESPIRATION RATE: 14 BRPM | OXYGEN SATURATION: 97 % | HEART RATE: 74 BPM | SYSTOLIC BLOOD PRESSURE: 140 MMHG | DIASTOLIC BLOOD PRESSURE: 83 MMHG | TEMPERATURE: 97.2 F

## 2018-04-25 PROCEDURE — 3331090001 HH PPS REVENUE CREDIT

## 2018-04-25 PROCEDURE — 3331090002 HH PPS REVENUE DEBIT

## 2018-04-26 PROCEDURE — 3331090001 HH PPS REVENUE CREDIT

## 2018-04-26 PROCEDURE — 3331090002 HH PPS REVENUE DEBIT

## 2018-04-27 ENCOUNTER — HOME CARE VISIT (OUTPATIENT)
Dept: HOME HEALTH SERVICES | Facility: HOME HEALTH | Age: 53
End: 2018-04-27
Payer: MEDICARE

## 2018-04-27 PROCEDURE — 3331090002 HH PPS REVENUE DEBIT

## 2018-04-27 PROCEDURE — 3331090001 HH PPS REVENUE CREDIT

## 2018-04-28 ENCOUNTER — HOME CARE VISIT (OUTPATIENT)
Dept: SCHEDULING | Facility: HOME HEALTH | Age: 53
End: 2018-04-28
Payer: MEDICARE

## 2018-04-28 PROCEDURE — G0299 HHS/HOSPICE OF RN EA 15 MIN: HCPCS

## 2018-04-28 PROCEDURE — 3331090002 HH PPS REVENUE DEBIT

## 2018-04-28 PROCEDURE — 3331090001 HH PPS REVENUE CREDIT

## 2018-04-29 PROCEDURE — 3331090002 HH PPS REVENUE DEBIT

## 2018-04-29 PROCEDURE — 3331090001 HH PPS REVENUE CREDIT

## 2018-04-30 VITALS
SYSTOLIC BLOOD PRESSURE: 122 MMHG | DIASTOLIC BLOOD PRESSURE: 78 MMHG | TEMPERATURE: 97 F | RESPIRATION RATE: 16 BRPM | OXYGEN SATURATION: 97 % | HEART RATE: 58 BPM

## 2018-04-30 PROCEDURE — 3331090002 HH PPS REVENUE DEBIT

## 2018-04-30 PROCEDURE — 3331090001 HH PPS REVENUE CREDIT

## 2018-05-01 ENCOUNTER — HOME CARE VISIT (OUTPATIENT)
Dept: SCHEDULING | Facility: HOME HEALTH | Age: 53
End: 2018-05-01
Payer: MEDICARE

## 2018-05-01 VITALS
OXYGEN SATURATION: 97 % | SYSTOLIC BLOOD PRESSURE: 162 MMHG | RESPIRATION RATE: 14 BRPM | DIASTOLIC BLOOD PRESSURE: 87 MMHG | HEART RATE: 72 BPM | TEMPERATURE: 98.1 F

## 2018-05-01 PROCEDURE — 3331090002 HH PPS REVENUE DEBIT

## 2018-05-01 PROCEDURE — 3331090001 HH PPS REVENUE CREDIT

## 2018-05-01 PROCEDURE — G0495 RN CARE TRAIN/EDU IN HH: HCPCS

## 2018-05-02 PROCEDURE — 3331090001 HH PPS REVENUE CREDIT

## 2018-05-02 PROCEDURE — 3331090002 HH PPS REVENUE DEBIT

## 2018-05-03 ENCOUNTER — HOME CARE VISIT (OUTPATIENT)
Dept: SCHEDULING | Facility: HOME HEALTH | Age: 53
End: 2018-05-03
Payer: MEDICARE

## 2018-05-03 PROCEDURE — 3331090002 HH PPS REVENUE DEBIT

## 2018-05-03 PROCEDURE — 3331090001 HH PPS REVENUE CREDIT

## 2018-05-03 PROCEDURE — G0495 RN CARE TRAIN/EDU IN HH: HCPCS

## 2018-05-04 VITALS
DIASTOLIC BLOOD PRESSURE: 79 MMHG | SYSTOLIC BLOOD PRESSURE: 143 MMHG | HEART RATE: 65 BPM | OXYGEN SATURATION: 95 % | TEMPERATURE: 97.9 F | RESPIRATION RATE: 14 BRPM

## 2018-05-04 PROCEDURE — 3331090002 HH PPS REVENUE DEBIT

## 2018-05-04 PROCEDURE — 3331090001 HH PPS REVENUE CREDIT

## 2018-05-05 PROCEDURE — 3331090001 HH PPS REVENUE CREDIT

## 2018-05-05 PROCEDURE — 3331090002 HH PPS REVENUE DEBIT

## 2018-05-06 PROCEDURE — 3331090001 HH PPS REVENUE CREDIT

## 2018-05-06 PROCEDURE — 3331090002 HH PPS REVENUE DEBIT

## 2018-05-07 PROCEDURE — 3331090002 HH PPS REVENUE DEBIT

## 2018-05-07 PROCEDURE — 3331090001 HH PPS REVENUE CREDIT

## 2018-05-08 ENCOUNTER — HOME CARE VISIT (OUTPATIENT)
Dept: SCHEDULING | Facility: HOME HEALTH | Age: 53
End: 2018-05-08
Payer: MEDICARE

## 2018-05-08 VITALS
OXYGEN SATURATION: 95 % | DIASTOLIC BLOOD PRESSURE: 81 MMHG | RESPIRATION RATE: 14 BRPM | TEMPERATURE: 98.4 F | HEART RATE: 72 BPM | SYSTOLIC BLOOD PRESSURE: 138 MMHG

## 2018-05-08 PROCEDURE — G0495 RN CARE TRAIN/EDU IN HH: HCPCS

## 2018-05-08 PROCEDURE — 3331090002 HH PPS REVENUE DEBIT

## 2018-05-08 PROCEDURE — 3331090001 HH PPS REVENUE CREDIT

## 2018-05-09 PROCEDURE — 3331090001 HH PPS REVENUE CREDIT

## 2018-05-09 PROCEDURE — 3331090002 HH PPS REVENUE DEBIT

## 2018-05-10 PROCEDURE — 3331090002 HH PPS REVENUE DEBIT

## 2018-05-10 PROCEDURE — 3331090001 HH PPS REVENUE CREDIT

## 2018-05-11 PROCEDURE — 3331090002 HH PPS REVENUE DEBIT

## 2018-05-11 PROCEDURE — 3331090001 HH PPS REVENUE CREDIT

## 2018-05-12 PROCEDURE — 3331090001 HH PPS REVENUE CREDIT

## 2018-05-12 PROCEDURE — 3331090002 HH PPS REVENUE DEBIT

## 2018-05-13 PROCEDURE — 3331090002 HH PPS REVENUE DEBIT

## 2018-05-13 PROCEDURE — 3331090001 HH PPS REVENUE CREDIT

## 2018-05-14 PROCEDURE — 3331090002 HH PPS REVENUE DEBIT

## 2018-05-14 PROCEDURE — 3331090001 HH PPS REVENUE CREDIT

## 2018-05-15 PROCEDURE — 3331090001 HH PPS REVENUE CREDIT

## 2018-05-15 PROCEDURE — 3331090002 HH PPS REVENUE DEBIT

## 2018-05-16 ENCOUNTER — HOME CARE VISIT (OUTPATIENT)
Dept: SCHEDULING | Facility: HOME HEALTH | Age: 53
End: 2018-05-16
Payer: MEDICARE

## 2018-05-16 VITALS
SYSTOLIC BLOOD PRESSURE: 142 MMHG | TEMPERATURE: 97.4 F | OXYGEN SATURATION: 95 % | HEART RATE: 64 BPM | RESPIRATION RATE: 14 BRPM | DIASTOLIC BLOOD PRESSURE: 74 MMHG

## 2018-05-16 PROCEDURE — 3331090002 HH PPS REVENUE DEBIT

## 2018-05-16 PROCEDURE — 3331090001 HH PPS REVENUE CREDIT

## 2018-05-16 PROCEDURE — G0495 RN CARE TRAIN/EDU IN HH: HCPCS

## 2018-09-29 ENCOUNTER — HOSPITAL ENCOUNTER (EMERGENCY)
Age: 53
Discharge: HOME | End: 2018-09-29
Attending: EMERGENCY MEDICINE
Payer: MEDICARE

## 2018-09-29 VITALS
HEIGHT: 65 IN | WEIGHT: 180 LBS | SYSTOLIC BLOOD PRESSURE: 166 MMHG | TEMPERATURE: 98.5 F | DIASTOLIC BLOOD PRESSURE: 93 MMHG | HEART RATE: 62 BPM | OXYGEN SATURATION: 97 % | BODY MASS INDEX: 29.99 KG/M2 | RESPIRATION RATE: 16 BRPM

## 2018-09-29 DIAGNOSIS — S01.81XA FACIAL LACERATION, INITIAL ENCOUNTER: ICD-10-CM

## 2018-09-29 DIAGNOSIS — R51.9 ACUTE NONINTRACTABLE HEADACHE, UNSPECIFIED HEADACHE TYPE: Primary | ICD-10-CM

## 2018-09-29 PROCEDURE — 75810000293 HC SIMP/SUPERF WND  RPR

## 2018-09-29 PROCEDURE — 77030002986 HC SUT PROL J&J -A

## 2018-09-29 PROCEDURE — 77030018836 HC SOL IRR NACL ICUM -A

## 2018-09-29 PROCEDURE — 74011250637 HC RX REV CODE- 250/637: Performed by: EMERGENCY MEDICINE

## 2018-09-29 PROCEDURE — 99283 EMERGENCY DEPT VISIT LOW MDM: CPT

## 2018-09-29 RX ORDER — METOCLOPRAMIDE 10 MG/1
10 TABLET ORAL
Status: COMPLETED | OUTPATIENT
Start: 2018-09-29 | End: 2018-09-29

## 2018-09-29 RX ORDER — IBUPROFEN 400 MG/1
800 TABLET ORAL
Status: COMPLETED | OUTPATIENT
Start: 2018-09-29 | End: 2018-09-29

## 2018-09-29 RX ADMIN — IBUPROFEN 800 MG: 400 TABLET ORAL at 09:17

## 2018-09-29 RX ADMIN — METOCLOPRAMIDE HYDROCHLORIDE 10 MG: 10 TABLET ORAL at 09:17

## 2018-09-29 NOTE — ED NOTES
Discharged per ambulatory, no acute distress on discharge, written isnt given to pt, verbalizes understanding Patient armband removed and shredded

## 2018-09-29 NOTE — DISCHARGE INSTRUCTIONS
Headache: Care Instructions  Your Care Instructions    Headaches have many possible causes. Most headaches aren't a sign of a more serious problem, and they will get better on their own. Home treatment may help you feel better faster. The doctor has checked you carefully, but problems can develop later. If you notice any problems or new symptoms, get medical treatment right away. Follow-up care is a key part of your treatment and safety. Be sure to make and go to all appointments, and call your doctor if you are having problems. It's also a good idea to know your test results and keep a list of the medicines you take. How can you care for yourself at home? · Do not drive if you have taken a prescription pain medicine. · Rest in a quiet, dark room until your headache is gone. Close your eyes and try to relax or go to sleep. Don't watch TV or read. · Put a cold, moist cloth or cold pack on the painful area for 10 to 20 minutes at a time. Put a thin cloth between the cold pack and your skin. · Use a warm, moist towel or a heating pad set on low to relax tight shoulder and neck muscles. · Have someone gently massage your neck and shoulders. · Take pain medicines exactly as directed. ¨ If the doctor gave you a prescription medicine for pain, take it as prescribed. ¨ If you are not taking a prescription pain medicine, ask your doctor if you can take an over-the-counter medicine. · Be careful not to take pain medicine more often than the instructions allow, because you may get worse or more frequent headaches when the medicine wears off. · Do not ignore new symptoms that occur with a headache, such as a fever, weakness or numbness, vision changes, or confusion. These may be signs of a more serious problem. To prevent headaches  · Keep a headache diary so you can figure out what triggers your headaches. Avoiding triggers may help you prevent headaches.  Record when each headache began, how long it lasted, and what the pain was like (throbbing, aching, stabbing, or dull). Write down any other symptoms you had with the headache, such as nausea, flashing lights or dark spots, or sensitivity to bright light or loud noise. Note if the headache occurred near your period. List anything that might have triggered the headache, such as certain foods (chocolate, cheese, wine) or odors, smoke, bright light, stress, or lack of sleep. · Find healthy ways to deal with stress. Headaches are most common during or right after stressful times. Take time to relax before and after you do something that has caused a headache in the past.  · Try to keep your muscles relaxed by keeping good posture. Check your jaw, face, neck, and shoulder muscles for tension, and try relaxing them. When sitting at a desk, change positions often, and stretch for 30 seconds each hour. · Get plenty of sleep and exercise. · Eat regularly and well. Long periods without food can trigger a headache. · Treat yourself to a massage. Some people find that regular massages are very helpful in relieving tension. · Limit caffeine by not drinking too much coffee, tea, or soda. But don't quit caffeine suddenly, because that can also give you headaches. · Reduce eyestrain from computers by blinking frequently and looking away from the computer screen every so often. Make sure you have proper eyewear and that your monitor is set up properly, about an arm's length away. · Seek help if you have depression or anxiety. Your headaches may be linked to these conditions. Treatment can both prevent headaches and help with symptoms of anxiety or depression. When should you call for help? Call 911 anytime you think you may need emergency care. For example, call if:    · You have signs of a stroke. These may include:  ¨ Sudden numbness, paralysis, or weakness in your face, arm, or leg, especially on only one side of your body. ¨ Sudden vision changes.   ¨ Sudden trouble speaking. ¨ Sudden confusion or trouble understanding simple statements. ¨ Sudden problems with walking or balance. ¨ A sudden, severe headache that is different from past headaches.    Call your doctor now or seek immediate medical care if:    · You have a new or worse headache.     · Your headache gets much worse. Where can you learn more? Go to http://selena-lamonte.info/. Enter M271 in the search box to learn more about \"Headache: Care Instructions. \"  Current as of: October 9, 2017  Content Version: 11.7  © 4987-3528 [employer]. Care instructions adapted under license by [employer] (which disclaims liability or warranty for this information). If you have questions about a medical condition or this instruction, always ask your healthcare professional. [address] any warranty or liability for your use of this information. Cuts on the Face Closed With Stitches: Care Instructions  Your Care Instructions  A cut on your face can be on your chin, cheek, nose, forehead, eyelid, lip, or ear. The doctor used stitches to close the cut. Using stitches helps the cut heal and reduces scarring. The doctor may also have called in a specialist, such as a plastic surgeon, to close the cut. If the cut went deep and through the skin, the doctor may have put in two layers of stitches. The deeper layer brings the deep part of the cut together. These stitches will dissolve and don't need to be removed. The stitches in the upper layer are the ones you see on the cut. You will probably have a bandage. You will need to have the stitches removed, usually in 3 to 5 days. The doctor has checked you carefully, but problems can develop later. If you notice any problems or new symptoms, get medical treatment right away. Follow-up care is a key part of your treatment and safety.  Be sure to make and go to all appointments, and call your doctor if you are having problems. It's also a good idea to know your test results and keep a list of the medicines you take. How can you care for yourself at home? · Keep the cut dry for the first 24 to 48 hours. After this, you can shower if your doctor okays it. Pat the cut dry. · Don't soak the cut, such as in a bathtub. Your doctor will tell you when it's safe to get the cut wet. · If your doctor told you how to care for your cut, follow your doctor's instructions. If you did not get instructions, follow this general advice:  ¨ After the first 24 to 48 hours, wash around the cut with clean water 2 times a day. Don't use hydrogen peroxide or alcohol, which can slow healing. ¨ You may cover the cut with a thin layer of petroleum jelly, such as Vaseline, and a nonstick bandage. ¨ Apply more petroleum jelly and replace the bandage as needed. · Avoid any activity that could cause your cut to reopen. · Do not remove the stitches on your own. Your doctor will tell you when to come back to have the stitches removed. · Be safe with medicines. Take pain medicines exactly as directed. ¨ If the doctor gave you a prescription medicine for pain, take it as prescribed. ¨ If you are not taking a prescription pain medicine, ask your doctor if you can take an over-the-counter medicine. When should you call for help? Call your doctor now or seek immediate medical care if:    · You have new pain, or your pain gets worse.     · The skin near the cut is cold or pale or changes color.     · You have tingling, weakness, or numbness near the cut.     · The cut starts to bleed, and blood soaks through the bandage. Oozing small amounts of blood is normal.     · You have symptoms of infection, such as:  ¨ Increased pain, swelling, warmth, or redness around the cut. ¨ Red streaks leading from the cut. ¨ Pus draining from the cut.   ¨ A fever.    Watch closely for changes in your health, and be sure to contact your doctor if:    · You do not get better as expected. Where can you learn more? Go to http://selena-lamonte.info/. Enter U795 in the search box to learn more about \"Cuts on the Face Closed With Stitches: Care Instructions. \"  Current as of: November 20, 2017  Content Version: 11.7  © 2823-8591 [employer]. Care instructions adapted under license by [employer] (which disclaims liability or warranty for this information). If you have questions about a medical condition or this instruction, always ask your healthcare professional. [address] any warranty or liability for your use of this information.
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
## 2019-01-14 ENCOUNTER — HOSPITAL ENCOUNTER (EMERGENCY)
Age: 54
Discharge: HOME | End: 2019-01-14
Attending: EMERGENCY MEDICINE
Payer: MEDICARE

## 2019-01-14 ENCOUNTER — APPOINTMENT (OUTPATIENT)
Dept: CT IMAGING | Age: 54
End: 2019-01-14
Attending: PHYSICIAN ASSISTANT
Payer: MEDICARE

## 2019-01-14 VITALS
HEART RATE: 54 BPM | RESPIRATION RATE: 18 BRPM | OXYGEN SATURATION: 99 % | WEIGHT: 197 LBS | HEIGHT: 65 IN | BODY MASS INDEX: 32.82 KG/M2 | SYSTOLIC BLOOD PRESSURE: 193 MMHG | TEMPERATURE: 97.3 F | DIASTOLIC BLOOD PRESSURE: 101 MMHG

## 2019-01-14 DIAGNOSIS — R51.9 NONINTRACTABLE HEADACHE, UNSPECIFIED CHRONICITY PATTERN, UNSPECIFIED HEADACHE TYPE: Primary | ICD-10-CM

## 2019-01-14 LAB
ALBUMIN SERPL-MCNC: 3.7 G/DL (ref 3.4–5)
ALBUMIN/GLOB SERPL: 0.7 {RATIO} (ref 0.8–1.7)
ALP SERPL-CCNC: 129 U/L (ref 45–117)
ALT SERPL-CCNC: 25 U/L (ref 13–56)
ANION GAP SERPL CALC-SCNC: 5 MMOL/L (ref 3–18)
APPEARANCE UR: CLEAR
AST SERPL-CCNC: 29 U/L (ref 15–37)
BASOPHILS # BLD: 0 K/UL (ref 0–0.1)
BASOPHILS NFR BLD: 1 % (ref 0–2)
BILIRUB SERPL-MCNC: 0.3 MG/DL (ref 0.2–1)
BILIRUB UR QL: NEGATIVE
BUN SERPL-MCNC: 18 MG/DL (ref 7–18)
BUN/CREAT SERPL: 20 (ref 12–20)
CALCIUM SERPL-MCNC: 8.7 MG/DL (ref 8.5–10.1)
CHLORIDE SERPL-SCNC: 108 MMOL/L (ref 100–108)
CO2 SERPL-SCNC: 28 MMOL/L (ref 21–32)
COLOR UR: YELLOW
CREAT SERPL-MCNC: 0.89 MG/DL (ref 0.6–1.3)
DIFFERENTIAL METHOD BLD: ABNORMAL
EOSINOPHIL # BLD: 0.2 K/UL (ref 0–0.4)
EOSINOPHIL NFR BLD: 3 % (ref 0–5)
ERYTHROCYTE [DISTWIDTH] IN BLOOD BY AUTOMATED COUNT: 13.5 % (ref 11.6–14.5)
GLOBULIN SER CALC-MCNC: 5 G/DL (ref 2–4)
GLUCOSE SERPL-MCNC: 85 MG/DL (ref 74–99)
GLUCOSE UR STRIP.AUTO-MCNC: NEGATIVE MG/DL
HCT VFR BLD AUTO: 44.5 % (ref 35–45)
HGB BLD-MCNC: 14.6 G/DL (ref 12–16)
HGB UR QL STRIP: NEGATIVE
KETONES UR QL STRIP.AUTO: NEGATIVE MG/DL
LEUKOCYTE ESTERASE UR QL STRIP.AUTO: NEGATIVE
LYMPHOCYTES # BLD: 2.7 K/UL (ref 0.9–3.6)
LYMPHOCYTES NFR BLD: 43 % (ref 21–52)
MCH RBC QN AUTO: 28.4 PG (ref 24–34)
MCHC RBC AUTO-ENTMCNC: 32.8 G/DL (ref 31–37)
MCV RBC AUTO: 86.6 FL (ref 74–97)
MONOCYTES # BLD: 0.4 K/UL (ref 0.05–1.2)
MONOCYTES NFR BLD: 7 % (ref 3–10)
NEUTS SEG # BLD: 2.9 K/UL (ref 1.8–8)
NEUTS SEG NFR BLD: 46 % (ref 40–73)
NITRITE UR QL STRIP.AUTO: NEGATIVE
PH UR STRIP: 5.5 [PH] (ref 5–8)
PLATELET # BLD AUTO: 100 K/UL (ref 135–420)
PMV BLD AUTO: 10 FL (ref 9.2–11.8)
POTASSIUM SERPL-SCNC: 3.9 MMOL/L (ref 3.5–5.5)
PROT SERPL-MCNC: 8.7 G/DL (ref 6.4–8.2)
PROT UR STRIP-MCNC: NEGATIVE MG/DL
RBC # BLD AUTO: 5.14 M/UL (ref 4.2–5.3)
SODIUM SERPL-SCNC: 141 MMOL/L (ref 136–145)
SP GR UR REFRACTOMETRY: 1.01 (ref 1–1.03)
UROBILINOGEN UR QL STRIP.AUTO: 0.2 EU/DL (ref 0.2–1)
WBC # BLD AUTO: 6.3 K/UL (ref 4.6–13.2)

## 2019-01-14 PROCEDURE — 99284 EMERGENCY DEPT VISIT MOD MDM: CPT

## 2019-01-14 PROCEDURE — 74011000258 HC RX REV CODE- 258: Performed by: PHYSICIAN ASSISTANT

## 2019-01-14 PROCEDURE — 74011250636 HC RX REV CODE- 250/636: Performed by: PHYSICIAN ASSISTANT

## 2019-01-14 PROCEDURE — 70450 CT HEAD/BRAIN W/O DYE: CPT

## 2019-01-14 PROCEDURE — 96375 TX/PRO/DX INJ NEW DRUG ADDON: CPT

## 2019-01-14 PROCEDURE — 81003 URINALYSIS AUTO W/O SCOPE: CPT

## 2019-01-14 PROCEDURE — 96361 HYDRATE IV INFUSION ADD-ON: CPT

## 2019-01-14 PROCEDURE — 85025 COMPLETE CBC W/AUTO DIFF WBC: CPT

## 2019-01-14 PROCEDURE — 80053 COMPREHEN METABOLIC PANEL: CPT

## 2019-01-14 PROCEDURE — 96365 THER/PROPH/DIAG IV INF INIT: CPT

## 2019-01-14 RX ORDER — BUTALBITAL, ACETAMINOPHEN AND CAFFEINE 300; 40; 50 MG/1; MG/1; MG/1
1 CAPSULE ORAL
Qty: 12 CAP | Refills: 0 | Status: SHIPPED | OUTPATIENT
Start: 2019-01-14 | End: 2019-06-13 | Stop reason: ALTCHOICE

## 2019-01-14 RX ORDER — PROMETHAZINE HYDROCHLORIDE 25 MG/1
25 TABLET ORAL
Qty: 12 TAB | Refills: 0 | Status: SHIPPED | OUTPATIENT
Start: 2019-01-14

## 2019-01-14 RX ORDER — AMOXICILLIN AND CLAVULANATE POTASSIUM 875; 125 MG/1; MG/1
1 TABLET, FILM COATED ORAL 2 TIMES DAILY
Qty: 20 TAB | Refills: 0 | Status: SHIPPED | OUTPATIENT
Start: 2019-01-14 | End: 2019-01-24

## 2019-01-14 RX ORDER — DIPHENHYDRAMINE HYDROCHLORIDE 50 MG/ML
25 INJECTION, SOLUTION INTRAMUSCULAR; INTRAVENOUS
Status: COMPLETED | OUTPATIENT
Start: 2019-01-14 | End: 2019-01-14

## 2019-01-14 RX ADMIN — DIPHENHYDRAMINE HYDROCHLORIDE 25 MG: 50 INJECTION, SOLUTION INTRAMUSCULAR; INTRAVENOUS at 17:06

## 2019-01-14 RX ADMIN — SODIUM CHLORIDE 1000 ML: 900 INJECTION, SOLUTION INTRAVENOUS at 17:06

## 2019-01-14 RX ADMIN — PROMETHAZINE HYDROCHLORIDE 25 MG: 25 INJECTION, SOLUTION INTRAMUSCULAR; INTRAVENOUS at 17:33

## 2019-01-14 NOTE — DISCHARGE INSTRUCTIONS

## 2019-01-14 NOTE — ED TRIAGE NOTES
C/o headache that comes and goes since last week, sinus congestion, nausea and vomiting. Denies fever.

## 2019-01-14 NOTE — ED PROVIDER NOTES
EMERGENCY DEPARTMENT HISTORY AND PHYSICAL EXAM 
 
Date: 1/14/2019 Patient Name: Mayo Martinez History of Presenting Illness Chief Complaint Patient presents with  
 Headache History Provided By: Patient Chief Complaint: HA 
Duration: 1 Weeks Timing:  Progressive Location: radiating from posterior to anterior Quality: Aching Severity: 7 out of 10 Modifying Factors: Ibuprofen taken to no relief Associated Symptoms: chills, nausea, vomiting, sinus congestion, rhinorrhea, mild cough, and photophobia Additional History (Context):  
4:22 PM 
Mayo Martinez is a 48 y.o. female with PMHX of HTN, WPW, GERD, arthritis, hepatitis C, and SVT who presents to the emergency department C/O a 7/10 gradual onset HA radiating from her posterior to anterior onset 1 week ago that worsened 5 hours ago. Not thunderclap. Associated sxs include chills, nausea, vomiting, sinus congestion, rhinorrhea, mild cough, and photophobia. Patient reports that she thought she only had a head cold before her HA worsened and her other symptoms arose earlier today. Notes that she was prescribed Fioricet by Dr. Mateusz Mariee but is currently out of the prescription. Endorses tobacco use (2-3 per day) and occasional EtOH use (on weekends) but denies illicit drug use. Pt denies sore throat, sinus pain, fever, and any other sxs or complaints. PCP: Kendra Chau MD 
 
Current Outpatient Medications Medication Sig Dispense Refill  MELOXICAM PO Take  by mouth.  amoxicillin-clavulanate (AUGMENTIN) 875-125 mg per tablet Take 1 Tab by mouth two (2) times a day for 10 days. Take with daily yogurt or probiotic. Take with food. 20 Tab 0  
 butalbital-acetaminophen-caff (FIORICET) -40 mg per capsule Take 1 Cap by mouth every four (4) hours as needed for Pain. 12 Cap 0  
 promethazine (PHENERGAN) 25 mg tablet Take 1 Tab by mouth every six (6) hours as needed.  As needed for nausea or vomiting 12 Tab 0  
  butalbital-acetaminophen-caff (FIORICET) -40 mg per capsule Take 1 Cap by mouth every four (4) hours as needed for Pain. 12 Cap 0  
 gabapentin (NEURONTIN) 300 mg capsule Take 300 mg by mouth every morning.  lisinopril (PRINIVIL, ZESTRIL) 20 mg tablet Take 20 mg by mouth daily.  verapamil SR (CALAN-SR) 180 mg CR tablet Take 180 mg by mouth nightly. Past History Past Medical History: 
Past Medical History:  
Diagnosis Date  Arthritis  GERD (gastroesophageal reflux disease)  Hepatitis C   
 Hypertension  SVT (supraventricular tachycardia) (HCC)  WPW (Nahomy-Parkinson-White syndrome) Past Surgical History: 
Past Surgical History:  
Procedure Laterality Date  HX COLECTOMY  HX KNEE ARTHROSCOPY Right [address] [facility] Hx knee surgery R knee  HX SVT ABLATION Family History: No family history on file. Social History: 
Social History Tobacco Use  Smoking status: Current Some Day Smoker Packs/day: 0.25 Years: 17.00 Pack years: 4.25 Types: Cigarettes  Smokeless tobacco: Never Used Substance Use Topics  Alcohol use: Yes Alcohol/week: 1.2 oz Types: 2 Glasses of wine per week Comment: socially  Drug use: No  
 
 
Allergies: 
No Known Allergies Review of Systems Review of Systems Constitutional: Positive for chills. Negative for fever. HENT: Positive for congestion (sinus) and rhinorrhea. Negative for sinus pain and sore throat. Eyes: Positive for photophobia. Respiratory: Positive for cough (mild). Negative for shortness of breath. Gastrointestinal: Positive for nausea and vomiting. Musculoskeletal: Negative for neck pain. Neurological: Positive for headaches. Negative for dizziness, weakness and light-headedness. All other systems reviewed and are negative. Physical Exam  
 
Vitals:  
 01/14/19 1549 BP: (!) 193/101 Pulse: (!) 54 Resp: 18  
 Temp: 97.3 °F (36.3 °C) SpO2: 99% Weight: 89.4 kg (197 lb) Height: 5' 5\" (1.651 m) Physical Exam  
Constitutional: She is oriented to person, place, and time. She appears well-developed and well-nourished. No distress. AA female in NAD. al  
HENT:  
Head: Normocephalic and atraumatic. Right Ear: External ear normal. No swelling or tenderness. Tympanic membrane is not perforated, not erythematous and not bulging. Left Ear: External ear normal. No swelling or tenderness. Tympanic membrane is not perforated, not erythematous and not bulging. Nose: Nose normal. No mucosal edema or rhinorrhea. Right sinus exhibits no maxillary sinus tenderness and no frontal sinus tenderness. Left sinus exhibits no maxillary sinus tenderness and no frontal sinus tenderness. Mouth/Throat: Uvula is midline, oropharynx is clear and moist and mucous membranes are normal. No oral lesions. No trismus in the jaw. No dental abscesses or uvula swelling. No oropharyngeal exudate, posterior oropharyngeal edema, posterior oropharyngeal erythema or tonsillar abscesses. Eyes: Conjunctivae are normal. Right eye exhibits no discharge. Left eye exhibits no discharge. No scleral icterus. Neck: Normal range of motion. Neck supple. Cardiovascular: Normal rate, regular rhythm, normal heart sounds and intact distal pulses. Exam reveals no gallop and no friction rub. No murmur heard. Pulmonary/Chest: Effort normal and breath sounds normal. No accessory muscle usage. No tachypnea. No respiratory distress. She has no decreased breath sounds. She has no wheezes. She has no rhonchi. She has no rales. Abdominal: Soft. Musculoskeletal: Normal range of motion. She exhibits no edema or tenderness. Lymphadenopathy:  
  She has no cervical adenopathy. Neurological: She is alert and oriented to person, place, and time. Skin: Skin is warm and dry. No rash noted. She is not diaphoretic. No erythema. Psychiatric: She has a normal mood and affect. Judgment normal.  
Nursing note and vitals reviewed. Diagnostic Study Results Labs - Recent Results (from the past 12 hour(s)) URINALYSIS W/ RFLX MICROSCOPIC Collection Time: 01/14/19  4:30 PM  
Result Value Ref Range Color YELLOW Appearance CLEAR Specific gravity 1.014 1.005 - 1.030    
 pH (UA) 5.5 5.0 - 8.0 Protein NEGATIVE  NEG mg/dL Glucose NEGATIVE  NEG mg/dL Ketone NEGATIVE  NEG mg/dL Bilirubin NEGATIVE  NEG Blood NEGATIVE  NEG Urobilinogen 0.2 0.2 - 1.0 EU/dL Nitrites NEGATIVE  NEG Leukocyte Esterase NEGATIVE  NEG    
CBC WITH AUTOMATED DIFF Collection Time: 01/14/19  4:45 PM  
Result Value Ref Range WBC 6.3 4.6 - 13.2 K/uL  
 RBC 5.14 4.20 - 5.30 M/uL  
 HGB 14.6 12.0 - 16.0 g/dL HCT 44.5 35.0 - 45.0 % MCV 86.6 74.0 - 97.0 FL  
 MCH 28.4 24.0 - 34.0 PG  
 MCHC 32.8 31.0 - 37.0 g/dL  
 RDW 13.5 11.6 - 14.5 % PLATELET 274 (L) 633 - 420 K/uL MPV 10.0 9.2 - 11.8 FL  
 NEUTROPHILS 46 40 - 73 % LYMPHOCYTES 43 21 - 52 % MONOCYTES 7 3 - 10 % EOSINOPHILS 3 0 - 5 % BASOPHILS 1 0 - 2 %  
 ABS. NEUTROPHILS 2.9 1.8 - 8.0 K/UL  
 ABS. LYMPHOCYTES 2.7 0.9 - 3.6 K/UL  
 ABS. MONOCYTES 0.4 0.05 - 1.2 K/UL  
 ABS. EOSINOPHILS 0.2 0.0 - 0.4 K/UL  
 ABS. BASOPHILS 0.0 0.0 - 0.1 K/UL  
 DF AUTOMATED METABOLIC PANEL, COMPREHENSIVE Collection Time: 01/14/19  4:45 PM  
Result Value Ref Range Sodium 141 136 - 145 mmol/L Potassium 3.9 3.5 - 5.5 mmol/L Chloride 108 100 - 108 mmol/L  
 CO2 28 21 - 32 mmol/L Anion gap 5 3.0 - 18 mmol/L Glucose 85 74 - 99 mg/dL BUN 18 7.0 - 18 MG/DL Creatinine 0.89 0.6 - 1.3 MG/DL  
 BUN/Creatinine ratio 20 12 - 20 GFR est AA >60 >60 ml/min/1.73m2 GFR est non-AA >60 >60 ml/min/1.73m2 Calcium 8.7 8.5 - 10.1 MG/DL  Bilirubin, total 0.3 0.2 - 1.0 MG/DL  
 ALT (SGPT) 25 13 - 56 U/L  
 AST (SGOT) 29 15 - 37 U/L  
 Alk. phosphatase 129 (H) 45 - 117 U/L Protein, total 8.7 (H) 6.4 - 8.2 g/dL Albumin 3.7 3.4 - 5.0 g/dL Globulin 5.0 (H) 2.0 - 4.0 g/dL A-G Ratio 0.7 (L) 0.8 - 1.7 Radiologic Studies -  
CT HEAD WO CONT Final Result IMPRESSION:  
  
No acute intracranial abnormalities. Stable exam  
  
 
CT Results  (Last 48 hours) 01/14/19 1723  CT HEAD WO CONT Final result Impression:  IMPRESSION:  
   
No acute intracranial abnormalities. Stable exam  
  
 Narrative:  EXAM: CT head INDICATION: Headache. COMPARISON: November 22, 2016. TECHNIQUE: Axial CT imaging of the head was performed without intravenous  
contrast.  
   
One or more dose reduction techniques were used on this CT: automated exposure  
control, adjustment of the mAs and/or kVp according to patient size, and  
iterative reconstruction techniques. The specific techniques used on this CT  
exam have been documented in the patient's electronic medical record. Digital Imaging and Communications in Medicine (DICOM) format image data are  
available to nonaffiliated external healthcare facilities or entities on a  
secure, media free, reciprocally searchable basis with patient authorization for  
at least a 12-month period after this study. _______________ FINDINGS:  
   
BRAIN AND POSTERIOR FOSSA: The sulci, ventricles and basal cisterns  are within  
normal limits for the patient's age. . There is no intracranial hemorrhage, mass  
effect, or midline shift. There are no areas of abnormal parenchymal  
attenuation. Anil Mcbride EXTRA-AXIAL SPACES AND MENINGES: There are no abnormal extra-axial fluid  
collections. CALVARIUM: Intact. SINUSES: Clear. OTHER: None.  
   
_______________ CXR Results  (Last 48 hours) None Medications given in the ED- Medications  
sodium chloride 0.9 % bolus infusion 1,000 mL (0 mL IntraVENous IV Completed 1/14/19 1817) diphenhydrAMINE (BENADRYL) injection 25 mg (25 mg IntraVENous Given 1/14/19 1706) promethazine (PHENERGAN) 25 mg in 0.9% sodium chloride 50 mL IVPB (0 mg IntraVENous IV Completed 1/14/19 1817) Medical Decision Making I am the first provider for this patient. I reviewed the vital signs, available nursing notes, past medical history, past surgical history, family history and social history. Vital Signs-Reviewed the patient's vital signs. Pulse Oximetry Analysis - 99% on RA Records Reviewed: Nursing Notes and Old Medical Records Provider Notes (Medical Decision Making): tension, cluster, sinusitis, migraine, pseudotumor. Consider SAH. Doubt CVA/TIA Procedures: 
Procedures ED Course:  
4:22 PM Initial assessment performed. The patients presenting problems have been discussed, and they are in agreement with the care plan formulated and outlined with them. I have encouraged them to ask questions as they arise throughout their visit. 5:15 PM 
Patient's presentation, labs/imaging and plan of care was reviewed with Taylor Rodriguez PA-C as part of sign out. They will evaluate CT Head as part of the plan discussed with the patient. Taylor Rodriguez PA-C's assistance in completion of this plan is greatly appreciated but it should be noted that I will be the provider of record for this patient. Written by Hetal Nelson ED Scribe, as dictated by Jefry Brito PA-C. Diagnosis and Disposition DISCHARGE NOTE: 
5:58 PM 
Norma Ratliff's  results have been reviewed with her. She has been counseled regarding her diagnosis, treatment, and plan. She verbally conveys understanding and agreement of the signs, symptoms, diagnosis, treatment and prognosis and additionally agrees to follow up as discussed. She also agrees with the care-plan and conveys that all of her questions have been answered.   I have also provided discharge instructions for her that include: educational information regarding their diagnosis and treatment, and list of reasons why they would want to return to the ED prior to their follow-up appointment, should her condition change. She has been provided with education for proper emergency department utilization. CLINICAL IMPRESSION: 
 
1. Nonintractable headache, unspecified chronicity pattern, unspecified headache type PLAN: 
1. D/C Home 2. Discharge Medication List as of 1/14/2019  6:00 PM  
  
START taking these medications Details  
amoxicillin-clavulanate (AUGMENTIN) 875-125 mg per tablet Take 1 Tab by mouth two (2) times a day for 10 days. Take with daily yogurt or probiotic. Take with food. , Print, Disp-20 Tab, R-0  
  
promethazine (PHENERGAN) 25 mg tablet Take 1 Tab by mouth every six (6) hours as needed. As needed for nausea or vomiting, Print, Disp-12 Tab, R-0  
  
  
CONTINUE these medications which have CHANGED Details  
!! butalbital-acetaminophen-caff (FIORICET) -40 mg per capsule Take 1 Cap by mouth every four (4) hours as needed for Pain., Print, Disp-12 Cap, R-0  
  
!! butalbital-acetaminophen-caff (FIORICET) -40 mg per capsule Take 1 Cap by mouth every four (4) hours as needed for Pain., Print, Disp-12 Cap, R-0  
  
 !! - Potential duplicate medications found. Please discuss with provider. CONTINUE these medications which have NOT CHANGED Details MELOXICAM PO Take  by mouth., Historical Med  
  
gabapentin (NEURONTIN) 300 mg capsule Take 300 mg by mouth every morning., Historical Med  
  
lisinopril (PRINIVIL, ZESTRIL) 20 mg tablet Take 20 mg by mouth daily. , Historical Med  
  
verapamil SR (CALAN-SR) 180 mg CR tablet Take 180 mg by mouth nightly., Historical Med 3. Follow-up Information Follow up With Specialties Details Why Contact Info Cuca Levine MD [facility]  For primary care follow up [address] [place] [address] [phone] [facility] EMERGENCY DEPT Emergency Medicine  If symptoms worsen [address] [place] [address] 
[phone]  
  
 
_______________________________ Attestations: This note is prepared by Jacquelynn Buerger, acting as Scribe for Melvina Flowers PA-C. Ivan's ELISE Flowers:  The scribe's documentation has been prepared under my direction and personally reviewed by me in its entirety. I confirm that the note above accurately reflects all work, treatment, procedures, and medical decision making performed by me. This note is prepared by Stacy Steinberg, acting as Scribe for Diamante Trivedi PA-C. Diamante Trivedi PA-C:  The scribe's documentation has been prepared under my direction and personally reviewed by me in its entirety. I confirm that the note above accurately reflects all work, treatment, procedures, and medical decision making performed by me. 
_______________________________

## 2019-04-25 ENCOUNTER — APPOINTMENT (OUTPATIENT)
Dept: GENERAL RADIOLOGY | Age: 54
End: 2019-04-25
Attending: EMERGENCY MEDICINE
Payer: MEDICARE

## 2019-04-25 ENCOUNTER — APPOINTMENT (OUTPATIENT)
Dept: CT IMAGING | Age: 54
End: 2019-04-25
Attending: EMERGENCY MEDICINE
Payer: MEDICARE

## 2019-04-25 ENCOUNTER — HOSPITAL ENCOUNTER (EMERGENCY)
Age: 54
Discharge: HOME | End: 2019-04-25
Attending: EMERGENCY MEDICINE
Payer: MEDICARE

## 2019-04-25 VITALS
HEART RATE: 68 BPM | OXYGEN SATURATION: 98 % | BODY MASS INDEX: 33.63 KG/M2 | HEIGHT: 64 IN | SYSTOLIC BLOOD PRESSURE: 201 MMHG | DIASTOLIC BLOOD PRESSURE: 106 MMHG | TEMPERATURE: 98.3 F | WEIGHT: 197 LBS | RESPIRATION RATE: 11 BRPM

## 2019-04-25 DIAGNOSIS — F17.200 TOBACCO USE DISORDER: ICD-10-CM

## 2019-04-25 DIAGNOSIS — I10 HYPERTENSION, UNSPECIFIED TYPE: ICD-10-CM

## 2019-04-25 DIAGNOSIS — J01.90 ACUTE SINUSITIS, RECURRENCE NOT SPECIFIED, UNSPECIFIED LOCATION: ICD-10-CM

## 2019-04-25 DIAGNOSIS — R51.9 NONINTRACTABLE HEADACHE, UNSPECIFIED CHRONICITY PATTERN, UNSPECIFIED HEADACHE TYPE: ICD-10-CM

## 2019-04-25 DIAGNOSIS — N39.0 URINARY TRACT INFECTION WITHOUT HEMATURIA, SITE UNSPECIFIED: ICD-10-CM

## 2019-04-25 DIAGNOSIS — R05.9 COUGH: ICD-10-CM

## 2019-04-25 DIAGNOSIS — R07.9 CHEST PAIN, UNSPECIFIED TYPE: Primary | ICD-10-CM

## 2019-04-25 LAB
ALBUMIN SERPL-MCNC: 3.6 G/DL (ref 3.4–5)
ALBUMIN/GLOB SERPL: 0.7 {RATIO} (ref 0.8–1.7)
ALP SERPL-CCNC: 111 U/L (ref 45–117)
ALT SERPL-CCNC: 26 U/L (ref 13–56)
ANION GAP SERPL CALC-SCNC: 6 MMOL/L (ref 3–18)
APPEARANCE UR: CLEAR
AST SERPL-CCNC: 32 U/L (ref 15–37)
BACTERIA URNS QL MICRO: ABNORMAL /HPF
BASOPHILS # BLD: 0 K/UL (ref 0–0.1)
BASOPHILS NFR BLD: 0 % (ref 0–2)
BILIRUB SERPL-MCNC: 0.4 MG/DL (ref 0.2–1)
BILIRUB UR QL: NEGATIVE
BNP SERPL-MCNC: 117 PG/ML (ref 0–900)
BUN SERPL-MCNC: 15 MG/DL (ref 7–18)
BUN/CREAT SERPL: 17 (ref 12–20)
CALCIUM SERPL-MCNC: 9 MG/DL (ref 8.5–10.1)
CHLORIDE SERPL-SCNC: 107 MMOL/L (ref 100–108)
CK MB CFR SERPL CALC: NORMAL % (ref 0–4)
CK MB CFR SERPL CALC: NORMAL % (ref 0–4)
CK MB SERPL-MCNC: <1 NG/ML (ref 5–25)
CK MB SERPL-MCNC: <1 NG/ML (ref 5–25)
CK SERPL-CCNC: 149 U/L (ref 26–192)
CK SERPL-CCNC: 164 U/L (ref 26–192)
CO2 SERPL-SCNC: 28 MMOL/L (ref 21–32)
COLOR UR: YELLOW
CREAT SERPL-MCNC: 0.86 MG/DL (ref 0.6–1.3)
D DIMER PPP FEU-MCNC: 0.38 UG/ML(FEU)
DIFFERENTIAL METHOD BLD: ABNORMAL
EOSINOPHIL # BLD: 0.2 K/UL (ref 0–0.4)
EOSINOPHIL NFR BLD: 3 % (ref 0–5)
EPITH CASTS URNS QL MICRO: ABNORMAL /LPF (ref 0–5)
ERYTHROCYTE [DISTWIDTH] IN BLOOD BY AUTOMATED COUNT: 14 % (ref 11.6–14.5)
GLOBULIN SER CALC-MCNC: 5 G/DL (ref 2–4)
GLUCOSE SERPL-MCNC: 95 MG/DL (ref 74–99)
GLUCOSE UR STRIP.AUTO-MCNC: NEGATIVE MG/DL
HCT VFR BLD AUTO: 41.3 % (ref 35–45)
HGB BLD-MCNC: 13.7 G/DL (ref 12–16)
HGB UR QL STRIP: NEGATIVE
KETONES UR QL STRIP.AUTO: NEGATIVE MG/DL
LEUKOCYTE ESTERASE UR QL STRIP.AUTO: ABNORMAL
LYMPHOCYTES # BLD: 1.7 K/UL (ref 0.9–3.6)
LYMPHOCYTES NFR BLD: 24 % (ref 21–52)
MCH RBC QN AUTO: 28.5 PG (ref 24–34)
MCHC RBC AUTO-ENTMCNC: 33.2 G/DL (ref 31–37)
MCV RBC AUTO: 85.9 FL (ref 74–97)
MONOCYTES # BLD: 0.6 K/UL (ref 0.05–1.2)
MONOCYTES NFR BLD: 9 % (ref 3–10)
NEUTS SEG # BLD: 4.5 K/UL (ref 1.8–8)
NEUTS SEG NFR BLD: 64 % (ref 40–73)
NITRITE UR QL STRIP.AUTO: NEGATIVE
PH UR STRIP: 5.5 [PH] (ref 5–8)
PLATELET # BLD AUTO: 112 K/UL (ref 135–420)
PMV BLD AUTO: 9.6 FL (ref 9.2–11.8)
POTASSIUM SERPL-SCNC: 4.2 MMOL/L (ref 3.5–5.5)
PROT SERPL-MCNC: 8.6 G/DL (ref 6.4–8.2)
PROT UR STRIP-MCNC: ABNORMAL MG/DL
RBC # BLD AUTO: 4.81 M/UL (ref 4.2–5.3)
RBC #/AREA URNS HPF: NEGATIVE /HPF (ref 0–5)
SODIUM SERPL-SCNC: 141 MMOL/L (ref 136–145)
SP GR UR REFRACTOMETRY: 1.02 (ref 1–1.03)
TROPONIN I SERPL-MCNC: <0.02 NG/ML (ref 0–0.04)
TROPONIN I SERPL-MCNC: <0.02 NG/ML (ref 0–0.04)
UROBILINOGEN UR QL STRIP.AUTO: 1 EU/DL (ref 0.2–1)
WBC # BLD AUTO: 7.1 K/UL (ref 4.6–13.2)
WBC URNS QL MICRO: ABNORMAL /HPF (ref 0–5)

## 2019-04-25 PROCEDURE — 93005 ELECTROCARDIOGRAM TRACING: CPT

## 2019-04-25 PROCEDURE — 83880 ASSAY OF NATRIURETIC PEPTIDE: CPT

## 2019-04-25 PROCEDURE — 82550 ASSAY OF CK (CPK): CPT

## 2019-04-25 PROCEDURE — 70450 CT HEAD/BRAIN W/O DYE: CPT

## 2019-04-25 PROCEDURE — 74011250637 HC RX REV CODE- 250/637: Performed by: EMERGENCY MEDICINE

## 2019-04-25 PROCEDURE — 85025 COMPLETE CBC W/AUTO DIFF WBC: CPT

## 2019-04-25 PROCEDURE — 81001 URINALYSIS AUTO W/SCOPE: CPT

## 2019-04-25 PROCEDURE — 85379 FIBRIN DEGRADATION QUANT: CPT

## 2019-04-25 PROCEDURE — 99285 EMERGENCY DEPT VISIT HI MDM: CPT

## 2019-04-25 PROCEDURE — 80053 COMPREHEN METABOLIC PANEL: CPT

## 2019-04-25 PROCEDURE — 74011250636 HC RX REV CODE- 250/636: Performed by: EMERGENCY MEDICINE

## 2019-04-25 PROCEDURE — 71045 X-RAY EXAM CHEST 1 VIEW: CPT

## 2019-04-25 PROCEDURE — 96374 THER/PROPH/DIAG INJ IV PUSH: CPT

## 2019-04-25 RX ORDER — CEPHALEXIN 250 MG/1
500 CAPSULE ORAL
Status: COMPLETED | OUTPATIENT
Start: 2019-04-25 | End: 2019-04-25

## 2019-04-25 RX ORDER — HYDROCODONE BITARTRATE AND ACETAMINOPHEN 5; 325 MG/1; MG/1
1 TABLET ORAL
Status: COMPLETED | OUTPATIENT
Start: 2019-04-25 | End: 2019-04-25

## 2019-04-25 RX ORDER — GUAIFENESIN 100 MG/5ML
81 LIQUID (ML) ORAL DAILY
Qty: 20 TAB | Refills: 0 | Status: SHIPPED | OUTPATIENT
Start: 2019-04-25 | End: 2019-05-15

## 2019-04-25 RX ORDER — BENZONATATE 100 MG/1
100 CAPSULE ORAL
Qty: 21 CAP | Refills: 0 | Status: SHIPPED | OUTPATIENT
Start: 2019-04-25 | End: 2019-05-02

## 2019-04-25 RX ORDER — KETOROLAC TROMETHAMINE 15 MG/ML
15 INJECTION, SOLUTION INTRAMUSCULAR; INTRAVENOUS
Status: COMPLETED | OUTPATIENT
Start: 2019-04-25 | End: 2019-04-25

## 2019-04-25 RX ORDER — CEPHALEXIN 500 MG/1
500 CAPSULE ORAL 3 TIMES DAILY
Qty: 21 CAP | Refills: 0 | Status: SHIPPED | OUTPATIENT
Start: 2019-04-25 | End: 2019-05-02

## 2019-04-25 RX ORDER — CLONIDINE HYDROCHLORIDE 0.1 MG/1
0.2 TABLET ORAL
Status: COMPLETED | OUTPATIENT
Start: 2019-04-25 | End: 2019-04-25

## 2019-04-25 RX ADMIN — CEPHALEXIN 500 MG: 250 CAPSULE ORAL at 19:21

## 2019-04-25 RX ADMIN — CLONIDINE HYDROCHLORIDE 0.2 MG: 0.1 TABLET ORAL at 19:22

## 2019-04-25 RX ADMIN — KETOROLAC TROMETHAMINE 15 MG: 15 INJECTION, SOLUTION INTRAMUSCULAR; INTRAVENOUS at 14:08

## 2019-04-25 RX ADMIN — HYDROCODONE BITARTRATE AND ACETAMINOPHEN 1 TABLET: 5; 325 TABLET ORAL at 16:00

## 2019-04-25 NOTE — DISCHARGE INSTRUCTIONS
High Blood Pressure: Care Instructions  Overview    It's normal for blood pressure to go up and down throughout the day. But if it stays up, you have high blood pressure. Another name for high blood pressure is hypertension. Despite what a lot of people think, high blood pressure usually doesn't cause headaches or make you feel dizzy or lightheaded. It usually has no symptoms. But it does increase your risk of stroke, heart attack, and other problems. You and your doctor will talk about your risks of these problems based on your blood pressure. Your doctor will give you a goal for your blood pressure. Your goal will be based on your health and your age. Lifestyle changes, such as eating healthy and being active, are always important to help lower blood pressure. You might also take medicine to reach your blood pressure goal.  Follow-up care is a key part of your treatment and safety. Be sure to make and go to all appointments, and call your doctor if you are having problems. It's also a good idea to know your test results and keep a list of the medicines you take. How can you care for yourself at home? Medical treatment  · If you stop taking your medicine, your blood pressure will go back up. You may take one or more types of medicine to lower your blood pressure. Be safe with medicines. Take your medicine exactly as prescribed. Call your doctor if you think you are having a problem with your medicine. · Talk to your doctor before you start taking aspirin every day. Aspirin can help certain people lower their risk of a heart attack or stroke. But taking aspirin isn't right for everyone, because it can cause serious bleeding. · See your doctor regularly. You may need to see the doctor more often at first or until your blood pressure comes down. · If you are taking blood pressure medicine, talk to your doctor before you take decongestants or anti-inflammatory medicine, such as ibuprofen.  Some of these medicines can raise blood pressure. · Learn how to check your blood pressure at home. Lifestyle changes  · Stay at a healthy weight. This is especially important if you put on weight around the waist. Losing even 10 pounds can help you lower your blood pressure. · If your doctor recommends it, get more exercise. Walking is a good choice. Bit by bit, increase the amount you walk every day. Try for at least 30 minutes on most days of the week. You also may want to swim, bike, or do other activities. · Avoid or limit alcohol. Talk to your doctor about whether you can drink any alcohol. · Try to limit how much sodium you eat to less than 2,300 milligrams (mg) a day. Your doctor may ask you to try to eat less than 1,500 mg a day. · Eat plenty of fruits (such as bananas and oranges), vegetables, legumes, whole grains, and low-fat dairy products. · Lower the amount of saturated fat in your diet. Saturated fat is found in animal products such as milk, cheese, and meat. Limiting these foods may help you lose weight and also lower your risk for heart disease. · Do not smoke. Smoking increases your risk for heart attack and stroke. If you need help quitting, talk to your doctor about stop-smoking programs and medicines. These can increase your chances of quitting for good. When should you call for help? Call 911 anytime you think you may need emergency care. This may mean having symptoms that suggest that your blood pressure is causing a serious heart or blood vessel problem. Your blood pressure may be over 180/120.   For example, call 911 if:    · You have symptoms of a heart attack. These may include:  ? Chest pain or pressure, or a strange feeling in the chest.  ? Sweating. ? Shortness of breath. ? Nausea or vomiting. ? Pain, pressure, or a strange feeling in the back, neck, jaw, or upper belly or in one or both shoulders or arms. ? Lightheadedness or sudden weakness.   ? A fast or irregular heartbeat.     · You have symptoms of a stroke. These may include:  ? Sudden numbness, tingling, weakness, or loss of movement in your face, arm, or leg, especially on only one side of your body. ? Sudden vision changes. ? Sudden trouble speaking. ? Sudden confusion or trouble understanding simple statements. ? Sudden problems with walking or balance. ? A sudden, severe headache that is different from past headaches.     · You have severe back or belly pain.    Do not wait until your blood pressure comes down on its own. Get help right away.   Call your doctor now or seek immediate care if:    · Your blood pressure is much higher than normal (such as 180/120 or higher), but you don't have symptoms.     · You think high blood pressure is causing symptoms, such as:  ? Severe headache.  ? Blurry vision.    Watch closely for changes in your health, and be sure to contact your doctor if:    · Your blood pressure measures higher than your doctor recommends at least 2 times. That means the top number is higher or the bottom number is higher, or both.     · You think you may be having side effects from your blood pressure medicine. Where can you learn more? Go to http://selena-lamonte.info/. Enter L759 in the search box to learn more about \"High Blood Pressure: Care Instructions. \"  Current as of: July 22, 2018  Content Version: 11.9  © 9618-6694 [employer]. Care instructions adapted under license by [employer] (which disclaims liability or warranty for this information). If you have questions about a medical condition or this instruction, always ask your healthcare professional. [place] [address] any warranty or liability for your use of this information. Low Sodium Diet (2,000 Milligram): Care Instructions  Your Care Instructions    Too much sodium causes your body to hold on to extra water.  This can raise your blood pressure and force your heart and kidneys to work harder. In very serious cases, this could cause you to be put in the hospital. It might even be life-threatening. By limiting sodium, you will feel better and lower your risk of serious problems. The most common source of sodium is salt. People get most of the salt in their diet from canned, prepared, and packaged foods. Fast food and restaurant meals also are very high in sodium. Your doctor will probably limit your sodium to less than 2,000 milligrams (mg) a day. This limit counts all the sodium in prepared and packaged foods and any salt you add to your food. Follow-up care is a key part of your treatment and safety. Be sure to make and go to all appointments, and call your doctor if you are having problems. It's also a good idea to know your test results and keep a list of the medicines you take. How can you care for yourself at home? Read food labels  · Read labels on cans and food packages. The labels tell you how much sodium is in each serving. Make sure that you look at the serving size. If you eat more than the serving size, you have eaten more sodium. · Food labels also tell you the Percent Daily Value for sodium. Choose products with low Percent Daily Values for sodium. · Be aware that sodium can come in forms other than salt, including monosodium glutamate (MSG), sodium citrate, and sodium bicarbonate (baking soda). MSG is often added to Asian food. When you eat out, you can sometimes ask for food without MSG or added salt. Buy low-sodium foods  · Buy foods that are labeled \"unsalted\" (no salt added), \"sodium-free\" (less than 5 mg of sodium per serving), or \"low-sodium\" (less than 140 mg of sodium per serving). Foods labeled \"reduced-sodium\" and \"light sodium\" may still have too much sodium. Be sure to read the label to see how much sodium you are getting. · Buy fresh vegetables, or frozen vegetables without added sauces.  Buy low-sodium versions of canned vegetables, soups, and other canned goods. Prepare low-sodium meals  · Cut back on the amount of salt you use in cooking. This will help you adjust to the taste. Do not add salt after cooking. One teaspoon of salt has about 2,300 mg of sodium. · Take the salt shaker off the table. · Flavor your food with garlic, lemon juice, onion, vinegar, herbs, and spices. Do not use soy sauce, lite soy sauce, steak sauce, onion salt, garlic salt, celery salt, mustard, or ketchup on your food. · Use low-sodium salad dressings, sauces, and ketchup. Or make your own salad dressings and sauces without adding salt. · Use less salt (or none) when recipes call for it. You can often use half the salt a recipe calls for without losing flavor. Other foods such as rice, pasta, and grains do not need added salt. · Rinse canned vegetables, and cook them in fresh water. This removes some--but not all--of the salt. · Avoid water that is naturally high in sodium or that has been treated with water softeners, which add sodium. Call your local water company to find out the sodium content of your water supply. If you buy bottled water, read the label and choose a sodium-free brand. Avoid high-sodium foods  · Avoid eating:  ? Smoked, cured, salted, and canned meat, fish, and poultry. ? Ham, parsons, hot dogs, and luncheon meats. ? Regular, hard, and processed cheese and regular peanut butter. ? Crackers with salted tops, and other salted snack foods such as pretzels, chips, and salted popcorn. ? Frozen prepared meals, unless labeled low-sodium. ? Canned and dried soups, broths, and bouillon, unless labeled sodium-free or low-sodium. ? Canned vegetables, unless labeled sodium-free or low-sodium. ? [place] fries, pizza, tacos, and other fast foods. ? Pickles, olives, ketchup, and other condiments, especially soy sauce, unless labeled sodium-free or low-sodium. Where can you learn more? Go to http://selena-lamonte.info/.   Enter P850 in the search box to learn more about \"Low Sodium Diet (2,000 Milligram): Care Instructions. \"  Current as of: March 28, 2018  Content Version: 11.9  © 0249-5750 [employer]. Care instructions adapted under license by [employer] (which disclaims liability or warranty for this information). If you have questions about a medical condition or this instruction, always ask your healthcare professional. [place] [address] any warranty or liability for your use of this information. Stopping Smoking: Care Instructions  Your Care Instructions  Cigarette smokers crave the nicotine in cigarettes. Giving it up is much harder than simply changing a habit. Your body has to stop craving the nicotine. It is hard to quit, but you can do it. There are many tools that people use to quit smoking. You may find that combining tools works best for you. There are several steps to quitting. First you get ready to quit. Then you get support to help you. After that, you learn new skills and behaviors to become a nonsmoker. For many people, a necessary step is getting and using medicine. Your doctor will help you set up the plan that best meets your needs. You may want to attend a smoking cessation program to help you quit smoking. When you choose a program, look for one that has proven success. Ask your doctor for ideas. You will greatly increase your chances of success if you take medicine as well as get counseling or join a cessation program.  Some of the changes you feel when you first quit tobacco are uncomfortable. Your body will miss the nicotine at first, and you may feel short-tempered and grumpy. You may have trouble sleeping or concentrating. Medicine can help you deal with these symptoms. You may struggle with changing your smoking habits and rituals. The last step is the tricky one: Be prepared for the smoking urge to continue for a time. This is a lot to deal with, but keep at it. You will feel better. Follow-up care is a key part of your treatment and safety. Be sure to make and go to all appointments, and call your doctor if you are having problems. It's also a good idea to know your test results and keep a list of the medicines you take. How can you care for yourself at home? · Ask your family, friends, and coworkers for support. You have a better chance of quitting if you have help and support. · Join a support group, such as Nicotine Anonymous, for people who are trying to quit smoking. · Consider signing up for a smoking cessation program, such as the American Lung Association's Freedom from Smoking program.  · Get text messaging support. Go to the website at www.smokefree. gov to sign up for the [facility] program.  · Set a quit date. Pick your date carefully so that it is not right in the middle of a big deadline or stressful time. Once you quit, do not even take a puff. Get rid of all ashtrays and lighters after your last cigarette. Clean your house and your clothes so that they do not smell of smoke. · Learn how to be a nonsmoker. Think about ways you can avoid those things that make you reach for a cigarette. ? Avoid situations that put you at greatest risk for smoking. For some people, it is hard to have a drink with friends without smoking. For others, they might skip a coffee break with coworkers who smoke. ? Change your daily routine. Take a different route to work or eat a meal in a different place. · Cut down on stress. Calm yourself or release tension by doing an activity you enjoy, such as reading a book, taking a hot bath, or gardening. · Talk to your doctor or pharmacist about nicotine replacement therapy, which replaces the nicotine in your body. You still get nicotine but you do not use tobacco. Nicotine replacement products help you slowly reduce the amount of nicotine you need.  These products come in several forms, many of them available over-the-counter:  ? Nicotine patches  ? Nicotine gum and lozenges  ? Nicotine inhaler  · Ask your doctor about bupropion (Wellbutrin) or varenicline (Chantix), which are prescription medicines. They do not contain nicotine. They help you by reducing withdrawal symptoms, such as stress and anxiety. · Some people find hypnosis, acupuncture, and massage helpful for ending the smoking habit. · Eat a healthy diet and get regular exercise. Having healthy habits will help your body move past its craving for nicotine. · Be prepared to keep trying. Most people are not successful the first few times they try to quit. Do not get mad at yourself if you smoke again. Make a list of things you learned and think about when you want to try again, such as next week, next month, or next year. Where can you learn more? Go to http://selenaGateMelamonte.info/. Enter C284 in the search box to learn more about \"Stopping Smoking: Care Instructions. \"  Current as of: September 26, 2018  Content Version: 11.9  © 0864-0011 [employer]. Care instructions adapted under license by [employer] (which disclaims liability or warranty for this information). If you have questions about a medical condition or this instruction, always ask your healthcare professional. [address] any warranty or liability for your use of this information. Learning About Benefits From Quitting Smoking  How does quitting smoking make you healthier? If you're thinking about quitting smoking, you may have a few reasons to be smoke-free. Your health may be one of them. · When you quit smoking, you lower your risks for cancer, lung disease, heart attack, stroke, blood vessel disease, and blindness from macular degeneration. · When you're smoke-free, you get sick less often, and you heal faster. You are less likely to get colds, flu, bronchitis, and pneumonia.   · As a nonsmoker, you may find that your mood is better and you are less stressed. When and how will you feel healthier? Quitting has real health benefits that start from day 1 of being smoke-free. And the longer you stay smoke-free, the healthier you get and the better you feel. The first hours  · After just 20 minutes, your blood pressure and heart rate go down. That means there's less stress on your heart and blood vessels. · Within 12 hours, the level of carbon monoxide in your blood drops back to normal. That makes room for more oxygen. With more oxygen in your body, you may notice that you have more energy than when you smoked. After 2 weeks  · Your lungs start to work better. · Your risk of heart attack starts to drop. After 1 month  · When your lungs are clear, you cough less and breathe deeper, so it's easier to be active. · Your sense of taste and smell return. That means you can enjoy food more than you have since you started smoking. Over the years  · After 1 year, your risk of heart disease is half what it would be if you kept smoking. · After 5 years, your risk of stroke starts to shrink. Within a few years after that, it's about the same as if you'd never smoked. · After 10 years, your risk of dying from lung cancer is cut by about half. And your risk for many other types of cancer is lower too. How would quitting help others in your life? When you quit smoking, you improve the health of everyone who now breathes in your smoke. · Their heart, lung, and cancer risks drop, much like yours. · They are sick less. For babies and small children, living smoke-free means they're less likely to have ear infections, pneumonia, and bronchitis. · If you're a woman who is or will be pregnant someday, quitting smoking means a healthier . · Children who are close to you are less likely to become adult smokers. Where can you learn more? Go to http://selena-lamonte.info/.   Enter [id number] in the search box to learn more about \"Learning About Benefits From Quitting Smoking. \"  Current as of: September 26, 2018  Content Version: 11.9  © 5231-8093 [employer]. Care instructions adapted under license by [employer] (which disclaims liability or warranty for this information). If you have questions about a medical condition or this instruction, always ask your healthcare professional. [address] any warranty or liability for your use of this information. Sinusitis: Care Instructions  Your Care Instructions    Sinusitis is an infection of the lining of the sinus cavities in your head. Sinusitis often follows a cold. It causes pain and pressure in your head and face. In most cases, sinusitis gets better on its own in 1 to 2 weeks. But some mild symptoms may last for several weeks. Sometimes antibiotics are needed. Follow-up care is a key part of your treatment and safety. Be sure to make and go to all appointments, and call your doctor if you are having problems. It's also a good idea to know your test results and keep a list of the medicines you take. How can you care for yourself at home? · Take an over-the-counter pain medicine, such as acetaminophen (Tylenol), ibuprofen (Advil, Motrin), or naproxen (Aleve). Read and follow all instructions on the label. · If the doctor prescribed antibiotics, take them as directed. Do not stop taking them just because you feel better. You need to take the full course of antibiotics. · Be careful when taking over-the-counter cold or flu medicines and Tylenol at the same time. Many of these medicines have acetaminophen, which is Tylenol. Read the labels to make sure that you are not taking more than the recommended dose. Too much acetaminophen (Tylenol) can be harmful. · Breathe warm, moist air from a steamy shower, a hot bath, or a sink filled with hot water. Avoid cold, dry air. Using a humidifier in your home may help.  Follow the directions for cleaning the machine. · Use saline (saltwater) nasal washes to help keep your nasal passages open and wash out mucus and bacteria. You can buy saline nose drops at a grocery store or drugstore. Or you can make your own at home by adding 1 teaspoon of salt and 1 teaspoon of baking soda to 2 cups of distilled water. If you make your own, fill a bulb syringe with the solution, insert the tip into your nostril, and squeeze gently. Willia Slocumb your nose. · Put a hot, wet towel or a warm gel pack on your face 3 or 4 times a day for 5 to 10 minutes each time. · Try a decongestant nasal spray like oxymetazoline (Afrin). Do not use it for more than 3 days in a row. Using it for more than 3 days can make your congestion worse. When should you call for help? Call your doctor now or seek immediate medical care if:    · You have new or worse swelling or redness in your face or around your eyes.     · You have a new or higher fever.    Watch closely for changes in your health, and be sure to contact your doctor if:    · You have new or worse facial pain.     · The mucus from your nose becomes thicker (like pus) or has new blood in it.     · You are not getting better as expected. Where can you learn more? Go to http://selena-lamonte.info/. Enter D114 in the search box to learn more about \"Sinusitis: Care Instructions. \"  Current as of: March 27, 2018  Content Version: 11.9  © 8898-1356 [employer]. Care instructions adapted under license by [employer] (which disclaims liability or warranty for this information). If you have questions about a medical condition or this instruction, always ask your healthcare professional. [place] [address] any warranty or liability for your use of this information. Cough: Care Instructions  Your Care Instructions    A cough is your body's response to something that bothers your throat or airways.  Many things can cause a cough. You might cough because of a cold or the flu, bronchitis, or asthma. Smoking, postnasal drip, allergies, and stomach acid that backs up into your throat also can cause coughs. A cough is a symptom, not a disease. Most coughs stop when the cause, such as a cold, goes away. You can take a few steps at home to cough less and feel better. Follow-up care is a key part of your treatment and safety. Be sure to make and go to all appointments, and call your doctor if you are having problems. It's also a good idea to know your test results and keep a list of the medicines you take. How can you care for yourself at home? · Drink lots of water and other fluids. This helps thin the mucus and soothes a dry or sore throat. Honey or lemon juice in hot water or tea may ease a dry cough. · Take cough medicine as directed by your doctor. · Prop up your head on pillows to help you breathe and ease a dry cough. · Try cough drops to soothe a dry or sore throat. Cough drops don't stop a cough. Medicine-flavored cough drops are no better than candy-flavored drops or hard candy. · Do not smoke. Avoid secondhand smoke. If you need help quitting, talk to your doctor about stop-smoking programs and medicines. These can increase your chances of quitting for good. When should you call for help? Call 911 anytime you think you may need emergency care. For example, call if:    · You have severe trouble breathing.    Call your doctor now or seek immediate medical care if:    · You cough up blood.     · You have new or worse trouble breathing.     · You have a new or higher fever.     · You have a new rash.    Watch closely for changes in your health, and be sure to contact your doctor if:    · You cough more deeply or more often, especially if you notice more mucus or a change in the color of your mucus.     · You have new symptoms, such as a sore throat, an earache, or sinus pain.     · You do not get better as expected. Where can you learn more? Go to http://selena-lamonte.info/. Enter D279 in the search box to learn more about \"Cough: Care Instructions. \"  Current as of: September 5, 2018  Content Version: 11.9  © 5836-3406 [employer]. Care instructions adapted under license by [employer] (which disclaims liability or warranty for this information). If you have questions about a medical condition or this instruction, always ask your healthcare professional. [address] any warranty or liability for your use of this information. Chest Pain: Care Instructions  Your Care Instructions    There are many things that can cause chest pain. Some are not serious and will get better on their own in a few days. But some kinds of chest pain need more testing and treatment. Your doctor may have recommended a follow-up visit in the next 8 to 12 hours. If you are not getting better, you may need more tests or treatment. Even though your doctor has released you, you still need to watch for any problems. The doctor carefully checked you, but sometimes problems can develop later. If you have new symptoms or if your symptoms do not get better, get medical care right away. If you have worse or different chest pain or pressure that lasts more than 5 minutes or you passed out (lost consciousness), call 911 or seek other emergency help right away. A medical visit is only one step in your treatment. Even if you feel better, you still need to do what your doctor recommends, such as going to all suggested follow-up appointments and taking medicines exactly as directed. This will help you recover and help prevent future problems. How can you care for yourself at home? · Rest until you feel better. · Take your medicine exactly as prescribed. Call your doctor if you think you are having a problem with your medicine.   · Do not drive after taking a prescription pain medicine. When should you call for help? Call 911 if:    · You passed out (lost consciousness).     · You have severe difficulty breathing.     · You have symptoms of a heart attack. These may include:  ? Chest pain or pressure, or a strange feeling in your chest.  ? Sweating. ? Shortness of breath. ? Nausea or vomiting. ? Pain, pressure, or a strange feeling in your back, neck, jaw, or upper belly or in one or both shoulders or arms. ? Lightheadedness or sudden weakness. ? A fast or irregular heartbeat. After you call 911, the  may tell you to chew 1 adult-strength or 2 to 4 low-dose aspirin. Wait for an ambulance. Do not try to drive yourself.    Call your doctor today if:    · You have any trouble breathing.     · Your chest pain gets worse.     · You are dizzy or lightheaded, or you feel like you may faint.     · You are not getting better as expected.     · You are having new or different chest pain. Where can you learn more? Go to http://selena-lamonte.info/. Enter A120 in the search box to learn more about \"Chest Pain: Care Instructions. \"  Current as of: September 23, 2018  Content Version: 11.9  © 6653-4195 [employer]. Care instructions adapted under license by [employer] (which disclaims liability or warranty for this information). If you have questions about a medical condition or this instruction, always ask your healthcare professional. [place] [address] any warranty or liability for your use of this information. Headache: Care Instructions  Your Care Instructions    Headaches have many possible causes. Most headaches aren't a sign of a more serious problem, and they will get better on their own. Home treatment may help you feel better faster. The doctor has checked you carefully, but problems can develop later. If you notice any problems or new symptoms, get medical treatment right away.   Follow-up care is a key part of your treatment and safety. Be sure to make and go to all appointments, and call your doctor if you are having problems. It's also a good idea to know your test results and keep a list of the medicines you take. How can you care for yourself at home? · Do not drive if you have taken a prescription pain medicine. · Rest in a quiet, dark room until your headache is gone. Close your eyes and try to relax or go to sleep. Don't watch TV or read. · Put a cold, moist cloth or cold pack on the painful area for 10 to 20 minutes at a time. Put a thin cloth between the cold pack and your skin. · Use a warm, moist towel or a heating pad set on low to relax tight shoulder and neck muscles. · Have someone gently massage your neck and shoulders. · Take pain medicines exactly as directed. ? If the doctor gave you a prescription medicine for pain, take it as prescribed. ? If you are not taking a prescription pain medicine, ask your doctor if you can take an over-the-counter medicine. · Be careful not to take pain medicine more often than the instructions allow, because you may get worse or more frequent headaches when the medicine wears off. · Do not ignore new symptoms that occur with a headache, such as a fever, weakness or numbness, vision changes, or confusion. These may be signs of a more serious problem. To prevent headaches  · Keep a headache diary so you can figure out what triggers your headaches. Avoiding triggers may help you prevent headaches. Record when each headache began, how long it lasted, and what the pain was like (throbbing, aching, stabbing, or dull). Write down any other symptoms you had with the headache, such as nausea, flashing lights or dark spots, or sensitivity to bright light or loud noise. Note if the headache occurred near your period.  List anything that might have triggered the headache, such as certain foods (chocolate, cheese, wine) or odors, smoke, bright light, stress, or lack of sleep. · Find healthy ways to deal with stress. Headaches are most common during or right after stressful times. Take time to relax before and after you do something that has caused a headache in the past.  · Try to keep your muscles relaxed by keeping good posture. Check your jaw, face, neck, and shoulder muscles for tension, and try relaxing them. When sitting at a desk, change positions often, and stretch for 30 seconds each hour. · Get plenty of sleep and exercise. · Eat regularly and well. Long periods without food can trigger a headache. · Treat yourself to a massage. Some people find that regular massages are very helpful in relieving tension. · Limit caffeine by not drinking too much coffee, tea, or soda. But don't quit caffeine suddenly, because that can also give you headaches. · Reduce eyestrain from computers by blinking frequently and looking away from the computer screen every so often. Make sure you have proper eyewear and that your monitor is set up properly, about an arm's length away. · Seek help if you have depression or anxiety. Your headaches may be linked to these conditions. Treatment can both prevent headaches and help with symptoms of anxiety or depression. When should you call for help? Call 911 anytime you think you may need emergency care. For example, call if:    · You have signs of a stroke. These may include:  ? Sudden numbness, paralysis, or weakness in your face, arm, or leg, especially on only one side of your body. ? Sudden vision changes. ? Sudden trouble speaking. ? Sudden confusion or trouble understanding simple statements. ? Sudden problems with walking or balance. ? A sudden, severe headache that is different from past headaches.    Call your doctor now or seek immediate medical care if:    · You have a new or worse headache.     · Your headache gets much worse. Where can you learn more? Go to http://selena-lamonte.info/.   Enter N984 in the search box to learn more about \"Headache: Care Instructions. \"  Current as of: Anamaria 3, 2018  Content Version: 11.9  © 4340-1465 [employer]. Care instructions adapted under license by [employer] (which disclaims liability or warranty for this information). If you have questions about a medical condition or this instruction, always ask your healthcare professional. [address] any warranty or liability for your use of this information. Sinusitis: Care Instructions  Your Care Instructions    Sinusitis is an infection of the lining of the sinus cavities in your head. Sinusitis often follows a cold. It causes pain and pressure in your head and face. In most cases, sinusitis gets better on its own in 1 to 2 weeks. But some mild symptoms may last for several weeks. Sometimes antibiotics are needed. Follow-up care is a key part of your treatment and safety. Be sure to make and go to all appointments, and call your doctor if you are having problems. It's also a good idea to know your test results and keep a list of the medicines you take. How can you care for yourself at home? · Take an over-the-counter pain medicine, such as acetaminophen (Tylenol), ibuprofen (Advil, Motrin), or naproxen (Aleve). Read and follow all instructions on the label. · If the doctor prescribed antibiotics, take them as directed. Do not stop taking them just because you feel better. You need to take the full course of antibiotics. · Be careful when taking over-the-counter cold or flu medicines and Tylenol at the same time. Many of these medicines have acetaminophen, which is Tylenol. Read the labels to make sure that you are not taking more than the recommended dose. Too much acetaminophen (Tylenol) can be harmful. · Breathe warm, moist air from a steamy shower, a hot bath, or a sink filled with hot water. Avoid cold, dry air. Using a humidifier in your home may help.  Follow the directions for cleaning the machine. · Use saline (saltwater) nasal washes to help keep your nasal passages open and wash out mucus and bacteria. You can buy saline nose drops at a grocery store or drugstore. Or you can make your own at home by adding 1 teaspoon of salt and 1 teaspoon of baking soda to 2 cups of distilled water. If you make your own, fill a bulb syringe with the solution, insert the tip into your nostril, and squeeze gently. [place] your nose. · Put a hot, wet towel or a warm gel pack on your face 3 or 4 times a day for 5 to 10 minutes each time. · Try a decongestant nasal spray like oxymetazoline (Afrin). Do not use it for more than 3 days in a row. Using it for more than 3 days can make your congestion worse. When should you call for help? Call your doctor now or seek immediate medical care if:    · You have new or worse swelling or redness in your face or around your eyes.     · You have a new or higher fever.    Watch closely for changes in your health, and be sure to contact your doctor if:    · You have new or worse facial pain.     · The mucus from your nose becomes thicker (like pus) or has new blood in it.     · You are not getting better as expected. Where can you learn more? Go to http://selena-lamonte.info/. Enter B184 in the search box to learn more about \"Sinusitis: Care Instructions. \"  Current as of: March 27, 2018  Content Version: 11.9  © 0233-1610 [employer]. Care instructions adapted under license by [employer] (which disclaims liability or warranty for this information). If you have questions about a medical condition or this instruction, always ask your healthcare professional. [place] [address] any warranty or liability for your use of this information.               Urinary Tract Infection in Women: Care Instructions  Your Care Instructions    A urinary tract infection, or UTI, is a general term for an infection anywhere between the kidneys and the urethra (where urine comes out). Most UTIs are bladder infections. They often cause pain or burning when you urinate. UTIs are caused by bacteria and can be cured with antibiotics. Be sure to complete your treatment so that the infection goes away. Follow-up care is a key part of your treatment and safety. Be sure to make and go to all appointments, and call your doctor if you are having problems. It's also a good idea to know your test results and keep a list of the medicines you take. How can you care for yourself at home? · Take your antibiotics as directed. Do not stop taking them just because you feel better. You need to take the full course of antibiotics. · Drink extra water and other fluids for the next day or two. This may help wash out the bacteria that are causing the infection. (If you have kidney, heart, or liver disease and have to limit fluids, talk with your doctor before you increase your fluid intake.)  · Avoid drinks that are carbonated or have caffeine. They can irritate the bladder. · Urinate often. Try to empty your bladder each time. · To relieve pain, take a hot bath or lay a heating pad set on low over your lower belly or genital area. Never go to sleep with a heating pad in place. To prevent UTIs  · Drink plenty of water each day. This helps you urinate often, which clears bacteria from your system. (If you have kidney, heart, or liver disease and have to limit fluids, talk with your doctor before you increase your fluid intake.)  · Urinate when you need to. · Urinate right after you have sex. · Change sanitary pads often. · Avoid douches, bubble baths, feminine hygiene sprays, and other feminine hygiene products that have deodorants. · After going to the bathroom, wipe from front to back. When should you call for help?   Call your doctor now or seek immediate medical care if:    · Symptoms such as fever, chills, nausea, or vomiting get worse or appear for the first time.     · You have new pain in your back just below your rib cage. This is called flank pain.     · There is new blood or pus in your urine.     · You have any problems with your antibiotic medicine.    Watch closely for changes in your health, and be sure to contact your doctor if:    · You are not getting better after taking an antibiotic for 2 days.     · Your symptoms go away but then come back. Where can you learn more? Go to http://selena-lamonte.info/. Enter N855 in the search box to learn more about \"Urinary Tract Infection in Women: Care Instructions. \"  Current as of: March 20, 2018  Content Version: 11.9  © 6138-1852 [employer]. Care instructions adapted under license by [employer] (which disclaims liability or warranty for this information). If you have questions about a medical condition or this instruction, always ask your healthcare professional. [address] any warranty or liability for your use of this information.

## 2019-04-25 NOTE — ED NOTES
EMERGENCY DEPARTMENT HISTORY AND PHYSICAL EXAM 
 
Date: 4/25/2019 Patient Name: Mary Ann Prieto History of Presenting Illness Chief Complaint Patient presents with  Chest Pain  Cough History Provided By: Patient Chief Complaint:cough, chest pain and headache. Duration: Hours Timing:  Acute Location: chest 
Quality: [facility] Severity: 8 out of 10 Modifying Factors: breathing Associated Symptoms: HA Additional History (Context):  
1:39 PM 
Mary Ann Prieto is a 47 y.o. female with PMHX of HTN, SVT who presents to the emergency department C/O cough, CP. Associated sxs include HA. Pt denies fevers, and any other sxs or complaints. The patient has had nonproductive cough for 2 days and this afternoon around 12:00PM she had sudden onset of chest pain nonradiating associated with headache. Her chest pain was 8/10 severity. She had no shortness of breath. Pain was worse with deep breathing and coughing. Headache is constant without any weakness or numbness. She had some nausea without any vomiting. She denies any vertigo. She had no fevers. No abdominal pain vomiting or diarrhea. Patient has a history of hypertension. She denies diabetes or elevated cholesterol. There is no family history of heart disease. She denies any recent procedures. No history of DVT or PE. Patient smokes 1/4 pack/day. PCP: Sloan Primrose, MD 
 
Current Outpatient Medications Medication Sig Dispense Refill  cephALEXin (KEFLEX) 500 mg capsule Take 1 Cap by mouth three (3) times daily for 7 days. 21 Cap 0  
 aspirin 81 mg chewable tablet Take 1 Tab by mouth daily for 20 days. 20 Tab 0  
 benzonatate (TESSALON PERLES) 100 mg capsule Take 1 Cap by mouth three (3) times daily as needed for Cough for up to 7 days. 21 Cap 0  
 MELOXICAM PO Take  by mouth.  gabapentin (NEURONTIN) 300 mg capsule Take 300 mg by mouth every morning.  lisinopril (PRINIVIL, ZESTRIL) 20 mg tablet Take 20 mg by mouth daily.  verapamil SR (CALAN-SR) 180 mg CR tablet Take 180 mg by mouth nightly.  butalbital-acetaminophen-caff (FIORICET) -40 mg per capsule Take 1 Cap by mouth every four (4) hours as needed for Pain. 12 Cap 0  
 promethazine (PHENERGAN) 25 mg tablet Take 1 Tab by mouth every six (6) hours as needed. As needed for nausea or vomiting 12 Tab 0  
 butalbital-acetaminophen-caff (FIORICET) -40 mg per capsule Take 1 Cap by mouth every four (4) hours as needed for Pain. 12 Cap 0 Past History Past Medical History: 
Past Medical History:  
Diagnosis Date  Arthritis  GERD (gastroesophageal reflux disease)  Hepatitis C   
 Hypertension  SVT (supraventricular tachycardia) (HCC)  WPW (Nahomy-Parkinson-White syndrome) Past Surgical History: 
Past Surgical History:  
Procedure Laterality Date  HX COLECTOMY  HX KNEE ARTHROSCOPY Right [address] [facility] Hx knee surgery R knee  HX SVT ABLATION Family History: 
History reviewed. No pertinent family history. Social History: 
Social History Tobacco Use  Smoking status: Current Some Day Smoker Packs/day: 0.25 Years: 17.00 Pack years: 4.25 Types: Cigarettes  Smokeless tobacco: Never Used Substance Use Topics  Alcohol use: Yes Alcohol/week: 1.2 oz Types: 2 Glasses of wine per week Comment: socially  Drug use: Yes Types: Marijuana Comment: 1-2 monthly Allergies: 
No Known Allergies Review of Systems Review of Systems Constitutional: Negative for activity change, appetite change, fever and unexpected weight change. HENT: Negative for congestion and sore throat. Eyes: Negative for pain and redness. Respiratory: Positive for cough. Negative for shortness of breath. Cardiovascular: Positive for chest pain. Negative for palpitations. Gastrointestinal: Negative for abdominal pain, diarrhea, nausea and vomiting. Endocrine: Negative for polydipsia and polyuria. Genitourinary: Negative for difficulty urinating and dysuria. Musculoskeletal: Negative for back pain and neck pain. Skin: Negative for pallor and rash. Neurological: Negative for headaches. All other systems reviewed and are negative. Physical Exam  
 
Vitals:  
 04/25/19 1530 04/25/19 1630 04/25/19 1800 04/25/19 1922 BP: 178/71 163/86 187/86 (!) 201/106 Pulse: 64 61 (!) 56 68 Resp: 14 17 11 Temp:      
SpO2: 97% 100% 98% Weight:      
Height:      
 
Physical Exam  
Constitutional: She is oriented to person, place, and time. She appears well-developed and well-nourished. HENT:  
Head: Normocephalic and atraumatic. Right Ear: External ear normal.  
Left Ear: External ear normal.  
Nose: Nose normal.  
Mouth/Throat: Oropharynx is clear and moist.  
Eyes: Pupils are equal, round, and reactive to light. Conjunctivae and EOM are normal.  
Neck: Normal range of motion. Neck supple. No JVD present. No tracheal deviation present. Cardiovascular: Normal rate, regular rhythm, normal heart sounds and intact distal pulses. Exam reveals no gallop and no friction rub. No murmur heard. Pulmonary/Chest: Effort normal and breath sounds normal. No respiratory distress. She has no wheezes. She has no rales. Abdominal: Soft. Bowel sounds are normal. She exhibits no distension and no mass. There is no tenderness. There is no rebound and no guarding. Musculoskeletal: Normal range of motion. She exhibits no edema or tenderness. Neurological: She is alert and oriented to person, place, and time. She has normal reflexes. No cranial nerve deficit. CN II-XII intact, no facial droop or asymmetry;  No pronator drift; finger-nose-finger intact; good  and equal strength 5/5 bilateral upper and lower extremities; DTRs: 2+ upper and lower extremities, symmetric bilaterally; sensation is intact to light touch and position sense upper and lower extremities symmetric bilaterally; gait normal  
Skin: Skin is warm and dry. No rash noted. Psychiatric: She has a normal mood and affect. Her behavior is normal.  
Nursing note and vitals reviewed. Diagnostic Study Results Labs - Recent Results (from the past 24 hour(s)) EKG, 12 LEAD, INITIAL Collection Time: 04/25/19  1:36 PM  
Result Value Ref Range Ventricular Rate 61 BPM  
 Atrial Rate 61 BPM  
 P-R Interval 150 ms QRS Duration 82 ms Q-T Interval 432 ms QTC Calculation (Bezet) 434 ms Calculated P Axis 63 degrees Calculated R Axis 29 degrees Calculated T Axis 49 degrees Diagnosis Normal sinus rhythm Possible Left atrial enlargement Left ventricular hypertrophy Abnormal ECG When compared with ECG of 14-APR-2018 07:42, No significant change was found NT-PRO BNP Collection Time: 04/25/19  1:37 PM  
Result Value Ref Range NT pro- 0 - 618 PG/ML  
METABOLIC PANEL, COMPREHENSIVE Collection Time: 04/25/19  1:37 PM  
Result Value Ref Range Sodium 141 136 - 145 mmol/L Potassium 4.2 3.5 - 5.5 mmol/L Chloride 107 100 - 108 mmol/L  
 CO2 28 21 - 32 mmol/L Anion gap 6 3.0 - 18 mmol/L Glucose 95 74 - 99 mg/dL BUN 15 7.0 - 18 MG/DL Creatinine 0.86 0.6 - 1.3 MG/DL  
 BUN/Creatinine ratio 17 12 - 20 GFR est AA >60 >60 ml/min/1.73m2 GFR est non-AA >60 >60 ml/min/1.73m2 Calcium 9.0 8.5 - 10.1 MG/DL Bilirubin, total 0.4 0.2 - 1.0 MG/DL  
 ALT (SGPT) 26 13 - 56 U/L  
 AST (SGOT) 32 15 - 37 U/L Alk. phosphatase 111 45 - 117 U/L Protein, total 8.6 (H) 6.4 - 8.2 g/dL Albumin 3.6 3.4 - 5.0 g/dL Globulin 5.0 (H) 2.0 - 4.0 g/dL A-G Ratio 0.7 (L) 0.8 - 1.7    
CBC WITH AUTOMATED DIFF Collection Time: 04/25/19  1:37 PM  
Result Value Ref Range WBC 7.1 4.6 - 13.2 K/uL  
 RBC 4.81 4.20 - 5.30 M/uL HGB 13.7 12.0 - 16.0 g/dL HCT 41.3 35.0 - 45.0 % MCV 85.9 74.0 - 97.0 FL  
 MCH 28.5 24.0 - 34.0 PG  
 MCHC 33.2 31.0 - 37.0 g/dL  
 RDW 14.0 11.6 - 14.5 % PLATELET 351 (L) 284 - 420 K/uL MPV 9.6 9.2 - 11.8 FL  
 NEUTROPHILS 64 40 - 73 % LYMPHOCYTES 24 21 - 52 % MONOCYTES 9 3 - 10 % EOSINOPHILS 3 0 - 5 % BASOPHILS 0 0 - 2 %  
 ABS. NEUTROPHILS 4.5 1.8 - 8.0 K/UL  
 ABS. LYMPHOCYTES 1.7 0.9 - 3.6 K/UL  
 ABS. MONOCYTES 0.6 0.05 - 1.2 K/UL  
 ABS. EOSINOPHILS 0.2 0.0 - 0.4 K/UL  
 ABS. BASOPHILS 0.0 0.0 - 0.1 K/UL  
 DF AUTOMATED    
D DIMER Collection Time: 04/25/19  1:37 PM  
Result Value Ref Range D DIMER 0.38 <0.46 ug/ml(FEU) CARDIAC PANEL,(CK, CKMB & TROPONIN) Collection Time: 04/25/19  1:37 PM  
Result Value Ref Range  26 - 192 U/L  
 CK - MB <1.0 <3.6 ng/ml CK-MB Index  0.0 - 4.0 % CALCULATION NOT PERFORMED WHEN RESULT IS BELOW LINEAR LIMIT Troponin-I, QT <0.02 0.0 - 0.045 NG/ML  
URINALYSIS W/ RFLX MICROSCOPIC Collection Time: 04/25/19  1:45 PM  
Result Value Ref Range Color YELLOW Appearance CLEAR Specific gravity 1.017 1.005 - 1.030    
 pH (UA) 5.5 5.0 - 8.0 Protein TRACE (A) NEG mg/dL Glucose NEGATIVE  NEG mg/dL Ketone NEGATIVE  NEG mg/dL Bilirubin NEGATIVE  NEG Blood NEGATIVE  NEG Urobilinogen 1.0 0.2 - 1.0 EU/dL Nitrites NEGATIVE  NEG Leukocyte Esterase MODERATE (A) NEG URINE MICROSCOPIC ONLY Collection Time: 04/25/19  1:45 PM  
Result Value Ref Range WBC 2 to 4 0 - 5 /hpf  
 RBC NEGATIVE  0 - 5 /hpf Epithelial cells FEW 0 - 5 /lpf Bacteria FEW (A) NEG /hpf  
EKG, 12 LEAD, SUBSEQUENT Collection Time: 04/25/19  3:20 PM  
Result Value Ref Range Ventricular Rate 59 BPM  
 Atrial Rate 59 BPM  
 P-R Interval 150 ms QRS Duration 86 ms  
 Q-T Interval 458 ms QTC Calculation (Bezet) 453 ms Calculated P Axis 58 degrees Calculated R Axis 32 degrees Calculated T Axis 46 degrees Diagnosis Sinus bradycardia Possible Left atrial enlargement Borderline ECG When compared with ECG of 25-APR-2019 13:36, No significant change was found CARDIAC PANEL,(CK, CKMB & TROPONIN) Collection Time: 04/25/19  3:26 PM  
Result Value Ref Range  26 - 192 U/L  
 CK - MB <1.0 <3.6 ng/ml CK-MB Index  0.0 - 4.0 % CALCULATION NOT PERFORMED WHEN RESULT IS BELOW LINEAR LIMIT Troponin-I, QT <0.02 0.0 - 0.045 NG/ML No results found for this or any previous visit (from the past 12 hour(s)). Radiologic Studies -  
CT HEAD WO CONT Final Result IMPRESSION:  
  
  
1. Brain is normal. No acute CVA or bleed. 2. Sphenoid sinusitis. XR CHEST PORT Final Result IMPRESSION:  
  
No acute radiographic cardiopulmonary abnormality. CT Results  (Last 48 hours) 04/25/19 1733  CT HEAD WO CONT Final result Impression:  IMPRESSION:  
   
   
1. Brain is normal. No acute CVA or bleed. 2. Sphenoid sinusitis. Narrative:  EXAM: CT HEAD, WITHOUT IV CONTRAST  
   
COMPARISON: 1/14/2019 INDICATION: Headache TECHNIQUE: Multiple axial CT images of the head were obtained extending from the  
skull base through the vertex. Additional coronal and sagittal reformations were  
also performed. One or more dose reduction techniques were used on this CT:  
automated exposure control, adjustment of the mAs and/or kVp according to  
patient size, and iterative reconstruction techniques. The specific techniques  
used on this CT exam have been documented in the patient's electronic medical  
record. Digital Imaging and Communications in Medicine (DICOM) format image  
data are available to nonaffiliated external healthcare facilities or entities  
on a secure, media free, reciprocally searchable basis with patient authorization for at least a 12-month period after this study. _______________ FINDINGS:  
 BRAIN AND POSTERIOR FOSSA: The sulci, folia, ventricles and basal cisterns are  
within normal limits for the patient's age. There is no intracranial hemorrhage,  
mass effect, or midline shift. There are no areas of abnormal parenchymal  
attenuation. EXTRA-AXIAL SPACES AND MENINGES: There are no abnormal extra-axial fluid  
collections. CALVARIUM: No acute osseous abnormality. OTHER: There is some mucosal disease in the sphenoid sinus. Other paranasal  
sinuses appear clear.   
   
_______________ CXR Results  (Last 48 hours) 04/25/19 1352  XR CHEST PORT Final result Impression:  IMPRESSION:  
   
No acute radiographic cardiopulmonary abnormality. Narrative:  EXAM: XR CHEST PORT  
   
CLINICAL INDICATION/HISTORY: Chest pain  
-Additional: None COMPARISON: Several prior exams, most recently 4/12/2018 TECHNIQUE: Frontal view of the chest  
   
_______________ FINDINGS:  
   
HEART AND MEDIASTINUM: Normal cardiac size and mediastinal contours. LUNGS AND PLEURAL SPACES: No focal pneumonic consolidation, pneumothorax, or  
pleural effusion. BONY THORAX AND SOFT TISSUES: No acute osseous abnormality  
   
_______________ Medications given in the ED- Medications  
ketorolac (TORADOL) injection 15 mg (15 mg IntraVENous Given 4/25/19 1408) HYDROcodone-acetaminophen (NORCO) 5-325 mg per tablet 1 Tab (1 Tab Oral Given 4/25/19 1600) cephALEXin (KEFLEX) capsule 500 mg (500 mg Oral Given 4/25/19 1921) cloNIDine HCl (CATAPRES) tablet 0.2 mg (0.2 mg Oral Given 4/25/19 1922) Medical Decision Making I am the first provider for this patient. I reviewed the vital signs, available nursing notes, past medical history, past surgical history, family history and social history. Vital Signs-Reviewed the patient's vital signs. Pulse Oximetry Analysis - 97% on RA Cardiac Monitor: 
Rate: 61 bpm 
Rhythm: NSR 
 EKG interpretation: (Preliminary) 13:36 Normal sinus rhythm at 61 with LAD LVH and no acute ST-T wave changes suggestive of ischemia 15:20 Sinus bradycardia 59 with LAD and no acute ST-T wave changes suggestive of ischemia ECG read by Cali Devlin MD   
 
Records Reviewed: Nursing Notes Provider Notes (Medical Decision Making): DDx-cough, COPD, asthma, pneumonia, ACS, PE, CHF, headache Procedures: 
Procedures ED Course:  
Initial assessment performed. The patients presenting problems have been discussed, and they are in agreement with the care plan formulated and outlined with them. I have encouraged them to ask questions as they arise throughout their visit. 18:38 Case discussed with Dr. Naeem Man cardiology who recommends outpatient cardiology evaluation. Patient has a heart score= 3. Diagnosis and Disposition DISCUSSION: 
Patient was stable in the ED improved after Toradol and Norco with resolution of chest pain. Serial ECGs and Troponins showed no evidence of ACS. D-dimer normal, doubt PE. CXR was normal. Head CT scan showed sinusitis. Neurologic exam normal. U/A showed UTI. Patient was given Keflex. Patient's blood pressure was elevated. She was given 1 dose of clonidine. Heart score of 3, case was discussed with , who recommends outpatient cardiac testing. Patient advised to follow-up with her PCP and outpatient cardiology referral for outpatient cardiac stress testing. Patient advised to return to the emergency department if she has recurrent pain shortness of breath fevers weakness or numbness. Patient advised to stop smoking cigarettes. DISCHARGE NOTE: 
7:08PM 
Josue Ratliff's  results have been reviewed with her. She has been counseled regarding her diagnosis, treatment, and plan. She verbally conveys understanding and agreement of the signs, symptoms, diagnosis, treatment and prognosis and additionally agrees to follow up as discussed. She also agrees with the care-plan and conveys that all of her questions have been answered. I have also provided discharge instructions for her that include: educational information regarding their diagnosis and treatment, and list of reasons why they would want to return to the ED prior to their follow-up appointment, should her condition change. She has been provided with education for proper emergency department utilization. CLINICAL IMPRESSION: 
 
1. Chest pain, unspecified type 2. Cough 3. Nonintractable headache, unspecified chronicity pattern, unspecified headache type 4. Acute sinusitis, recurrence not specified, unspecified location 5. Urinary tract infection without hematuria, site unspecified 6. Hypertension, unspecified type 7. Tobacco use disorder PLAN: 
1. D/C Home 2. Discharge Medication List as of 4/25/2019  7:08 PM  
  
START taking these medications Details  
cephALEXin (KEFLEX) 500 mg capsule Take 1 Cap by mouth three (3) times daily for 7 days. , Print, Disp-21 Cap, R-0  
  
aspirin 81 mg chewable tablet Take 1 Tab by mouth daily for 20 days. , Print, Disp-20 Tab, R-0  
  
benzonatate (TESSALON PERLES) 100 mg capsule Take 1 Cap by mouth three (3) times daily as needed for Cough for up to 7 days. , Print, Disp-21 Cap, R-0  
  
  
CONTINUE these medications which have NOT CHANGED Details MELOXICAM PO Take  by mouth., Historical Med  
  
gabapentin (NEURONTIN) 300 mg capsule Take 300 mg by mouth every morning., Historical Med  
  
lisinopril (PRINIVIL, ZESTRIL) 20 mg tablet Take 20 mg by mouth daily. , Historical Med  
  
verapamil SR (CALAN-SR) 180 mg CR tablet Take 180 mg by mouth nightly., Historical Med  
  
!! butalbital-acetaminophen-caff (FIORICET) -40 mg per capsule Take 1 Cap by mouth every four (4) hours as needed for Pain., Print, Disp-12 Cap, R-0  
  
promethazine (PHENERGAN) 25 mg tablet Take 1 Tab by mouth every six (6) hours as needed. As needed for nausea or vomiting, Print, Disp-12 Tab, R-0  
  
!! butalbital-acetaminophen-caff (FIORICET) -40 mg per capsule Take 1 Cap by mouth every four (4) hours as needed for Pain., Print, Disp-12 Cap, R-0  
  
 !! - Potential duplicate medications found. Please discuss with provider. 3.  
Follow-up Information Follow up With Specialties Details Why Contact Info Aishwarya Hendricks MD Family Practice Call in 1 day For further evaluation [address] [facility] 
[phone] Curtis Berger MD Cardiology Call in 1 day For further evaluation [address] [facility] 
[phone] Please note that this dictation was completed with [employer], the computer voice recognition software. Quite often unanticipated grammatical, syntax, homophones, and other interpretive errors are inadvertently transcribed by the computer software. Please disregard these errors. Please excuse any errors that have escaped final proofreading.

## 2019-04-25 NOTE — ED NOTES
Pt returned from [address] [facility], placed back on continuous monitor, VS stable, pt denies pain at this time. Stretcher in lowest locked position, call bell within reach (right side rail), will continue to monitor.

## 2019-05-13 LAB
ATRIAL RATE: 59 BPM
ATRIAL RATE: 61 BPM
CALCULATED P AXIS, ECG09: 58 DEGREES
CALCULATED P AXIS, ECG09: 63 DEGREES
CALCULATED R AXIS, ECG10: 29 DEGREES
CALCULATED R AXIS, ECG10: 32 DEGREES
CALCULATED T AXIS, ECG11: 46 DEGREES
CALCULATED T AXIS, ECG11: 49 DEGREES
DIAGNOSIS, 93000: NORMAL
DIAGNOSIS, 93000: NORMAL
P-R INTERVAL, ECG05: 150 MS
P-R INTERVAL, ECG05: 150 MS
Q-T INTERVAL, ECG07: 432 MS
Q-T INTERVAL, ECG07: 458 MS
QRS DURATION, ECG06: 82 MS
QRS DURATION, ECG06: 86 MS
QTC CALCULATION (BEZET), ECG08: 434 MS
QTC CALCULATION (BEZET), ECG08: 453 MS
VENTRICULAR RATE, ECG03: 59 BPM
VENTRICULAR RATE, ECG03: 61 BPM

## 2019-06-13 ENCOUNTER — APPOINTMENT (OUTPATIENT)
Dept: GENERAL RADIOLOGY | Age: 54
End: 2019-06-13
Attending: PHYSICIAN ASSISTANT
Payer: MEDICARE

## 2019-06-13 ENCOUNTER — HOSPITAL ENCOUNTER (EMERGENCY)
Age: 54
Discharge: HOME | End: 2019-06-13
Attending: EMERGENCY MEDICINE
Payer: MEDICARE

## 2019-06-13 VITALS
HEART RATE: 57 BPM | BODY MASS INDEX: 32.99 KG/M2 | WEIGHT: 198 LBS | SYSTOLIC BLOOD PRESSURE: 145 MMHG | DIASTOLIC BLOOD PRESSURE: 88 MMHG | TEMPERATURE: 98.6 F | RESPIRATION RATE: 16 BRPM | OXYGEN SATURATION: 99 % | HEIGHT: 65 IN

## 2019-06-13 DIAGNOSIS — Z72.0 TOBACCO USE: ICD-10-CM

## 2019-06-13 DIAGNOSIS — M54.41 ACUTE RIGHT-SIDED LOW BACK PAIN WITH RIGHT-SIDED SCIATICA: Primary | ICD-10-CM

## 2019-06-13 DIAGNOSIS — S83.91XA SPRAIN OF RIGHT KNEE, UNSPECIFIED LIGAMENT, INITIAL ENCOUNTER: ICD-10-CM

## 2019-06-13 PROCEDURE — 73564 X-RAY EXAM KNEE 4 OR MORE: CPT

## 2019-06-13 PROCEDURE — 72110 X-RAY EXAM L-2 SPINE 4/>VWS: CPT

## 2019-06-13 PROCEDURE — 99281 EMR DPT VST MAYX REQ PHY/QHP: CPT

## 2019-06-13 RX ORDER — CHLORZOXAZONE 500 MG/1
500 TABLET ORAL
Qty: 15 TAB | Refills: 0 | OUTPATIENT
Start: 2019-06-13 | End: 2019-12-28

## 2019-06-13 RX ORDER — HYDROCODONE BITARTRATE AND ACETAMINOPHEN 5; 325 MG/1; MG/1
1 TABLET ORAL
Qty: 10 TAB | Refills: 0 | Status: SHIPPED | OUTPATIENT
Start: 2019-06-13 | End: 2019-06-18

## 2019-06-13 NOTE — ED PROVIDER NOTES
EMERGENCY DEPARTMENT HISTORY AND PHYSICAL EXAM    Date: 6/13/2019  Patient Name: Maryjo Cifuentes    History of Presenting Illness     Chief Complaint   Patient presents with    Back Pain         History Provided By: Patient    Chief Complaint: back pain    HPI(Context):   10:27 AM  Maryjo Cifuentes is a 47 y.o. female with PMHX of GERD, OA, Hep C, HTN who presents to the emergency department C/O low back pain. Associated sxs include radiation to RLE and R knee pain. Sxs x 2-3 days. Reports she was traveling to a graduation in [place] and slept on a couch. Pt fell off couch and injured R knee. Pain in lower back started soon after. Pain worse with movement. Better with rest. No relief with OTC meds. Pt reports hx of low back pain. Pt denies urinary/fecal incontinence, urinary retention, numbness, weakness, and any other sxs or complaints. PCP: Octavia Robles MD    Current Outpatient Medications   Medication Sig Dispense Refill    HYDROcodone-acetaminophen (NORCO) 5-325 mg per tablet Take 1 Tab by mouth every four (4) hours as needed for Pain for up to 5 days. Max Daily Amount: 6 Tabs. 10 Tab 0    chlorzoxazone (PARAFON FORTE DSC) 500 mg tablet Take 1 Tab by mouth three (3) times daily as needed for Muscle Spasm(s). 15 Tab 0    MELOXICAM PO Take  by mouth.  promethazine (PHENERGAN) 25 mg tablet Take 1 Tab by mouth every six (6) hours as needed. As needed for nausea or vomiting 12 Tab 0    gabapentin (NEURONTIN) 300 mg capsule Take 300 mg by mouth every morning.  lisinopril (PRINIVIL, ZESTRIL) 20 mg tablet Take 20 mg by mouth daily.  verapamil SR (CALAN-SR) 180 mg CR tablet Take 180 mg by mouth nightly.          Past History     Past Medical History:  Past Medical History:   Diagnosis Date    Arthritis     GERD (gastroesophageal reflux disease)     Hepatitis C     Hypertension     SVT (supraventricular tachycardia) (HCC)     WPW (Nahomy-Parkinson-White syndrome)        Past Surgical History:  Past Surgical History:   Procedure Laterality Date    HX COLECTOMY      HX KNEE ARTHROSCOPY Right     HX OTHER SURGICAL  1997    Hx knee surgery R knee    HX SVT ABLATION         Family History:  History reviewed. No pertinent family history. Social History:  Social History     Tobacco Use    Smoking status: Light Tobacco Smoker     Packs/day: 0.25     Years: 17.00     Pack years: 4.25     Types: Cigarettes    Smokeless tobacco: Never Used   Substance Use Topics    Alcohol use: Yes     Alcohol/week: 1.2 oz     Types: 2 Glasses of wine per week     Comment: socially    Drug use: Yes     Types: Marijuana     Comment: 1-2 monthly        Allergies:  No Known Allergies      Review of Systems   Review of Systems   Musculoskeletal: Positive for arthralgias, back pain and myalgias. Negative for neck pain. Neurological: Negative for weakness and numbness. All other systems reviewed and are negative. Physical Exam     Vitals:    06/13/19 1022   BP: 145/88   Pulse: (!) 57   Resp: 16   Temp: 98.6 °F (37 °C)   SpO2: 99%   Weight: 89.8 kg (198 lb)   Height: 5' 5\" (1.651 m)     Physical Exam   Constitutional: She is oriented to person, place, and time. She appears well-developed and well-nourished. No distress. AA female in NAD. Alert. ambulatory   HENT:   Head: Normocephalic and atraumatic. Right Ear: External ear normal.   Left Ear: External ear normal.   Eyes: Conjunctivae are normal.   Neck: Normal range of motion and full passive range of motion without pain. No spinous process tenderness and no muscular tenderness present. Normal range of motion present. Cardiovascular: Normal rate, regular rhythm, normal heart sounds and intact distal pulses. Exam reveals no gallop and no friction rub. No murmur heard. Pulmonary/Chest: Effort normal and breath sounds normal. No respiratory distress. She has no wheezes. She has no rales.    Musculoskeletal:        Right hip: She exhibits normal range of motion, no tenderness and no bony tenderness. Left hip: She exhibits normal range of motion, normal strength, no tenderness and no bony tenderness. Right knee: She exhibits normal range of motion, no swelling and no effusion. Tenderness found. Thoracic back: She exhibits normal range of motion, no tenderness, no bony tenderness, no deformity and no pain. Lumbar back: She exhibits tenderness and pain. She exhibits normal range of motion and no deformity (no step off). Back:    Neurological: She is alert and oriented to person, place, and time. Skin: She is not diaphoretic. Nursing note and vitals reviewed. Diagnostic Study Results     Labs -   No results found for this or any previous visit (from the past 12 hour(s)). XR SPINE LUMB MIN 4 V   Final Result   IMPRESSION:      1. No fracture. 2. Trace L3-4 anterior listhesis probably due to facet degeneration. Similar   retrolisthesis present on review of 4/12/2018 CT Lumbar spine. Consider   follow-up flexion and extension views if not already performed elsewhere. 3. Moderate multilevel degenerative changes particularly along the mid to lower   lumbar spine facet joints. XR KNEE RT MIN 4 V   Final Result   Impression:      No acute osseous abnormality. CT Results  (Last 48 hours)    None        CXR Results  (Last 48 hours)    None          Medications given in the ED-  Medications - No data to display      Medical Decision Making   I am the first provider for this patient. I reviewed the vital signs, available nursing notes, past medical history, past surgical history, family history and social history. Vital Signs-Reviewed the patient's vital signs.     Pulse Oximetry Analysis - 99% on RA     Records Reviewed: Nursing Notes    Provider Notes (Medical Decision Making): sprain, strain, contusion, sciatica, OA, fx    Procedures:  Procedures    ED Course:   10:27 AM Initial assessment performed. The patients presenting problems have been discussed, and they are in agreement with the care plan formulated and outlined with them. I have encouraged them to ask questions as they arise throughout their visit. Diagnosis and Disposition       Imaging unremarkable. NVI. No low back alarm sxs. Ambulatory. Will tx sxs. Reasons to RTED discussed with pt. All questions answered. Pt feels comfortable going home at this time. Pt expressed understanding and she agrees with plan. 1. Acute right-sided low back pain with right-sided sciatica    2. Sprain of right knee, unspecified ligament, initial encounter    3. Tobacco use        PLAN:  1. D/C Home  2. Discharge Medication List as of 6/13/2019 12:20 PM      START taking these medications    Details   HYDROcodone-acetaminophen (NORCO) 5-325 mg per tablet Take 1 Tab by mouth every four (4) hours as needed for Pain for up to 5 days. Max Daily Amount: 6 Tabs., Print, Disp-10 Tab, R-0      chlorzoxazone (PARAFON FORTE DSC) 500 mg tablet Take 1 Tab by mouth three (3) times daily as needed for Muscle Spasm(s). , Print, Disp-15 Tab, R-0         CONTINUE these medications which have NOT CHANGED    Details   MELOXICAM PO Take  by mouth., Historical Med      promethazine (PHENERGAN) 25 mg tablet Take 1 Tab by mouth every six (6) hours as needed. As needed for nausea or vomiting, Print, Disp-12 Tab, R-0      gabapentin (NEURONTIN) 300 mg capsule Take 300 mg by mouth every morning., Historical Med      lisinopril (PRINIVIL, ZESTRIL) 20 mg tablet Take 20 mg by mouth daily. , Historical Med      verapamil SR (CALAN-SR) 180 mg CR tablet Take 180 mg by mouth nightly., Historical Med         STOP taking these medications       butalbital-acetaminophen-caff (FIORICET) -40 mg per capsule Comments:   Reason for Stopping:         butalbital-acetaminophen-caff (FIORICET) -40 mg per capsule Comments:   Reason for Stopping:             3.   Follow-up Information Follow up With Specialties Details Why Contact Info    Hansa Gallo MD Orthopedic Surgery   [facility] [id number]  [phone]      Jen Velasquez MD [facility]   [address] [address] [address] [facility] EMERGENCY DEPT Emergency Medicine  As needed, If symptoms worsen [address] [facility] [address]  [phone]        _______________________________    Attestations: This note is prepared by Dixie Mena PA-C.  _______________________________        Please note that this dictation was completed with [employer], the computer voice recognition software. Quite often unanticipated grammatical, syntax, homophones, and other interpretive errors are inadvertently transcribed by the computer software. Please disregard these errors. Please excuse any errors that have escaped final proofreading.

## 2019-06-13 NOTE — ED TRIAGE NOTES
Patient ambu to ED with c/o lower RIGHT sided back pain that radiates down to RIGHT knee.  Patient reports burning sensation upon awakening to RIGHT knee     Patient recalls landing on RIGHT after fall off of couch this past weekend

## 2019-12-27 ENCOUNTER — HOSPITAL ENCOUNTER (EMERGENCY)
Age: 54
Discharge: HOME | End: 2019-12-28
Attending: EMERGENCY MEDICINE
Payer: MEDICARE

## 2019-12-27 VITALS
WEIGHT: 202 LBS | DIASTOLIC BLOOD PRESSURE: 81 MMHG | HEART RATE: 71 BPM | HEIGHT: 65 IN | RESPIRATION RATE: 16 BRPM | OXYGEN SATURATION: 100 % | TEMPERATURE: 98.6 F | BODY MASS INDEX: 33.66 KG/M2 | SYSTOLIC BLOOD PRESSURE: 172 MMHG

## 2019-12-27 DIAGNOSIS — G89.29 CHRONIC RIGHT-SIDED LOW BACK PAIN WITHOUT SCIATICA: Primary | ICD-10-CM

## 2019-12-27 DIAGNOSIS — M54.50 CHRONIC RIGHT-SIDED LOW BACK PAIN WITHOUT SCIATICA: Primary | ICD-10-CM

## 2019-12-27 PROCEDURE — 99282 EMERGENCY DEPT VISIT SF MDM: CPT

## 2019-12-28 RX ORDER — CHLORZOXAZONE 500 MG/1
500 TABLET ORAL
Qty: 12 TAB | Refills: 0 | OUTPATIENT
Start: 2019-12-28 | End: 2020-12-09

## 2019-12-28 RX ORDER — PREDNISONE 10 MG/1
TABLET ORAL
Qty: 48 TAB | Refills: 0 | OUTPATIENT
Start: 2019-12-28 | End: 2020-12-09

## 2019-12-28 NOTE — ED PROVIDER NOTES
EMERGENCY DEPARTMENT HISTORY AND PHYSICAL EXAM    Date: 12/27/2019  Patient Name: Dedra Nielsen    History of Presenting Illness     Chief Complaint   Patient presents with    Back Pain         History Provided By: Patient    Chief Complaint: back pain       Additional History (Context):   11:52 PM  Dedra Nielsen is a 47 y.o. female with PMHX chronic back pain presents to the emergency department C/O lower back pain that radiates into her right leg that began yesterday. Patient states that she has chronic back pain and occasionally gets flares of the pain. States this pain is consistent with a flare with no atypical features. She denies any injury, abdominal pain, incontinence, groin numbness, tingling or numbness in her legs. States she usually takes a muscle relaxer but she ran out. States she used to see Dr. Ruel Plata. PCP: Linsey Aguero MD    Current Outpatient Medications   Medication Sig Dispense Refill    predniSONE (STERAPRED DS) 10 mg dose pack Take as directed 48 Tab 0    chlorzoxazone (PARAFON FORTE DSC) 500 mg tablet Take 1 Tab by mouth four (4) times daily as needed for Muscle Spasm(s). 12 Tab 0    MELOXICAM PO Take  by mouth.  promethazine (PHENERGAN) 25 mg tablet Take 1 Tab by mouth every six (6) hours as needed. As needed for nausea or vomiting 12 Tab 0    gabapentin (NEURONTIN) 300 mg capsule Take 300 mg by mouth every morning.  lisinopril (PRINIVIL, ZESTRIL) 20 mg tablet Take 20 mg by mouth daily.  verapamil SR (CALAN-SR) 180 mg CR tablet Take 180 mg by mouth nightly.          Past History     Past Medical History:  Past Medical History:   Diagnosis Date    Arthritis     GERD (gastroesophageal reflux disease)     Hepatitis C     Hypertension     SVT (supraventricular tachycardia) (HCC)     WPW (Nahomy-Parkinson-White syndrome)        Past Surgical History:  Past Surgical History:   Procedure Laterality Date    HX COLECTOMY      HX KNEE ARTHROSCOPY Right     HX OTHER SURGICAL  1997    Hx knee surgery R knee    HX SVT ABLATION         Family History:  No family history on file. Social History:  Social History     Tobacco Use    Smoking status: Light Tobacco Smoker     Packs/day: 0.25     Years: 17.00     Pack years: 4.25     Types: Cigarettes    Smokeless tobacco: Never Used   Substance Use Topics    Alcohol use: Yes     Alcohol/week: 2.0 standard drinks     Types: 2 Glasses of wine per week     Comment: socially    Drug use: Yes     Types: Marijuana     Comment: 1-2 monthly        Allergies:  No Known Allergies    Review of Systems   Review of Systems   Constitutional: Negative for chills and fever. Respiratory: Negative for shortness of breath. Cardiovascular: Negative for chest pain and leg swelling. Gastrointestinal: Negative for abdominal pain, diarrhea, nausea and vomiting. Genitourinary: Negative for decreased urine volume, dysuria, enuresis, frequency, hematuria, pelvic pain and urgency. Musculoskeletal: Positive for arthralgias and back pain. Skin: Negative for rash. Neurological: Negative for weakness and numbness. All other systems reviewed and are negative. Physical Exam     Vitals:    12/27/19 2235   BP: 172/81   Pulse: 71   Resp: 16   Temp: 98.6 °F (37 °C)   SpO2: 100%   Weight: 91.6 kg (202 lb)   Height: 5' 5\" (1.651 m)     Physical Exam  Vitals signs and nursing note reviewed. Constitutional:       Appearance: She is well-developed. Comments: Alert, sitting up in chair in fast track room, able to ambulate   HENT:      Head: Normocephalic and atraumatic. Neck:      Musculoskeletal: Normal range of motion and neck supple. Cardiovascular:      Rate and Rhythm: Normal rate and regular rhythm. Heart sounds: Normal heart sounds. No murmur. Pulmonary:      Effort: Pulmonary effort is normal. No respiratory distress. Breath sounds: Normal breath sounds. No wheezing or rales.    Abdominal:      General: Bowel sounds are normal. There is no distension. Palpations: Abdomen is soft. Tenderness: There is no tenderness. There is no guarding or rebound. Musculoskeletal:        Back:       Comments: Lower back TTP, decreased ROM secondary to pain, no crepitus or step off, no bruising swelling or skin changes  BLE: strength 5/5, pulses 2+ for DP and PT, brisk cap refill, sensation intact      Skin:     General: Skin is warm and dry. Findings: No rash. Neurological:      Mental Status: She is alert and oriented to person, place, and time. Sensory: No sensory deficit. Psychiatric:         Judgment: Judgment normal.           Diagnostic Study Results     Labs:   No results found for this or any previous visit (from the past 12 hour(s)). Radiologic Studies:   No orders to display     CT Results  (Last 48 hours)    None        CXR Results  (Last 48 hours)    None          Medical Decision Making   I am the first provider for this patient. I reviewed the vital signs, available nursing notes, past medical history, past surgical history, family history and social history. Vital Signs: Reviewed the patient's vital signs. Pulse Oximetry Analysis: 100% on RA       Records Reviewed: Nursing Notes and Old Medical Records    Procedures:  Procedures    ED Course:   11:52 PM Initial assessment performed. The patients presenting problems have been discussed, and they are in agreement with the care plan formulated and outlined with them. I have encouraged them to ask questions as they arise throughout their visit. Discussion:  Pt presents with flare of her chronic low back. No injury. Pain radiates into the right leg. No leg swelling. No red flag signs or symptoms. States this is typical of her usual back pain without any atypical features. She has no abdominal pain or urinary symptoms. Doubt cauda equina. Will start on prednisone and give a refill of her Parafon.  Strict return precautions given, pt offering no questions or complaints. Diagnosis and Disposition     DISCHARGE NOTE:  Yoni Ratliff's  results have been reviewed with her. She has been counseled regarding her diagnosis, treatment, and plan. She verbally conveys understanding and agreement of the signs, symptoms, diagnosis, treatment and prognosis and additionally agrees to follow up as discussed. She also agrees with the care-plan and conveys that all of her questions have been answered. I have also provided discharge instructions for her that include: educational information regarding their diagnosis and treatment, and list of reasons why they would want to return to the ED prior to their follow-up appointment, should her condition change. She has been provided with education for proper emergency department utilization. CLINICAL IMPRESSION:    1. Chronic right-sided low back pain without sciatica        PLAN:  1. D/C Home  2. Discharge Medication List as of 12/28/2019 12:37 AM      START taking these medications    Details   predniSONE (STERAPRED DS) 10 mg dose pack Take as directed, Print, Disp-48 Tab, R-0         CONTINUE these medications which have CHANGED    Details   chlorzoxazone (PARAFON FORTE DSC) 500 mg tablet Take 1 Tab by mouth four (4) times daily as needed for Muscle Spasm(s). , Print, Disp-12 Tab, R-0         CONTINUE these medications which have NOT CHANGED    Details   MELOXICAM PO Take  by mouth., Historical Med      promethazine (PHENERGAN) 25 mg tablet Take 1 Tab by mouth every six (6) hours as needed. As needed for nausea or vomiting, Print, Disp-12 Tab, R-0      gabapentin (NEURONTIN) 300 mg capsule Take 300 mg by mouth every morning., Historical Med      lisinopril (PRINIVIL, ZESTRIL) 20 mg tablet Take 20 mg by mouth daily. , Historical Med      verapamil SR (CALAN-SR) 180 mg CR tablet Take 180 mg by mouth nightly., Historical Med           3.    Follow-up Information     Follow up With Specialties Details Why Contact Info    Luisa Peoples MD Family Practice  call for follow up and recheck  [address] [facility] Emergency [facility]  If symptoms worsen [address] [place]  [phone]    Jon Cabrera MD Orthopedic Surgery  call for follow up and recheck  [facility] [phone]                   Please note that this dictation was completed with [employer], the computer voice recognition software. Quite often unanticipated grammatical, syntax, homophones, and other interpretive errors are inadvertently transcribed by the computer software. Please disregard these errors. Please excuse any errors that have escaped final proofreading.

## 2019-12-28 NOTE — ED TRIAGE NOTES
Pt reports to ED c/c sciatica flare up. PT reports hx of sciatica, unable to get rx from doctor per patient due to vacation.

## 2020-08-27 ENCOUNTER — APPOINTMENT (OUTPATIENT)
Dept: CT IMAGING | Age: 55
End: 2020-08-27
Attending: PHYSICIAN ASSISTANT
Payer: MEDICARE

## 2020-08-27 ENCOUNTER — HOSPITAL ENCOUNTER (EMERGENCY)
Age: 55
Discharge: HOME | End: 2020-08-27
Attending: EMERGENCY MEDICINE
Payer: MEDICARE

## 2020-08-27 VITALS
RESPIRATION RATE: 18 BRPM | BODY MASS INDEX: 36.82 KG/M2 | TEMPERATURE: 97.9 F | SYSTOLIC BLOOD PRESSURE: 146 MMHG | WEIGHT: 221 LBS | HEART RATE: 74 BPM | DIASTOLIC BLOOD PRESSURE: 89 MMHG | OXYGEN SATURATION: 97 % | HEIGHT: 65 IN

## 2020-08-27 DIAGNOSIS — K57.90 DIVERTICULOSIS: ICD-10-CM

## 2020-08-27 DIAGNOSIS — K74.60 CIRRHOSIS OF LIVER WITHOUT ASCITES, UNSPECIFIED HEPATIC CIRRHOSIS TYPE (HCC): ICD-10-CM

## 2020-08-27 DIAGNOSIS — R10.9 RIGHT SIDED ABDOMINAL PAIN: Primary | ICD-10-CM

## 2020-08-27 DIAGNOSIS — K42.9 UMBILICAL HERNIA WITHOUT OBSTRUCTION AND WITHOUT GANGRENE: ICD-10-CM

## 2020-08-27 LAB
ALBUMIN SERPL-MCNC: 3.6 G/DL (ref 3.4–5)
ALBUMIN/GLOB SERPL: 0.8 {RATIO} (ref 0.8–1.7)
ALP SERPL-CCNC: 87 U/L (ref 45–117)
ALT SERPL-CCNC: 36 U/L (ref 13–56)
ANION GAP SERPL CALC-SCNC: 6 MMOL/L (ref 3–18)
APPEARANCE UR: CLEAR
AST SERPL-CCNC: 35 U/L (ref 10–38)
BASOPHILS # BLD: 0 K/UL (ref 0–0.1)
BASOPHILS NFR BLD: 0 % (ref 0–2)
BILIRUB SERPL-MCNC: 0.3 MG/DL (ref 0.2–1)
BILIRUB UR QL: NEGATIVE
BUN SERPL-MCNC: 13 MG/DL (ref 7–18)
BUN/CREAT SERPL: 13 (ref 12–20)
CALCIUM SERPL-MCNC: 9.1 MG/DL (ref 8.5–10.1)
CHLORIDE SERPL-SCNC: 106 MMOL/L (ref 100–111)
CO2 SERPL-SCNC: 29 MMOL/L (ref 21–32)
COLOR UR: YELLOW
CREAT SERPL-MCNC: 0.98 MG/DL (ref 0.6–1.3)
DIFFERENTIAL METHOD BLD: ABNORMAL
EOSINOPHIL # BLD: 0.2 K/UL (ref 0–0.4)
EOSINOPHIL NFR BLD: 3 % (ref 0–5)
ERYTHROCYTE [DISTWIDTH] IN BLOOD BY AUTOMATED COUNT: 14.2 % (ref 11.6–14.5)
GLOBULIN SER CALC-MCNC: 4.4 G/DL (ref 2–4)
GLUCOSE SERPL-MCNC: 112 MG/DL (ref 74–99)
GLUCOSE UR STRIP.AUTO-MCNC: NEGATIVE MG/DL
HCT VFR BLD AUTO: 41.2 % (ref 35–45)
HGB BLD-MCNC: 13.9 G/DL (ref 12–16)
HGB UR QL STRIP: NEGATIVE
KETONES UR QL STRIP.AUTO: NEGATIVE MG/DL
LEUKOCYTE ESTERASE UR QL STRIP.AUTO: NEGATIVE
LYMPHOCYTES # BLD: 2.5 K/UL (ref 0.9–3.6)
LYMPHOCYTES NFR BLD: 35 % (ref 21–52)
MCH RBC QN AUTO: 29 PG (ref 24–34)
MCHC RBC AUTO-ENTMCNC: 33.7 G/DL (ref 31–37)
MCV RBC AUTO: 86 FL (ref 74–97)
MONOCYTES # BLD: 0.5 K/UL (ref 0.05–1.2)
MONOCYTES NFR BLD: 7 % (ref 3–10)
NEUTS SEG # BLD: 3.8 K/UL (ref 1.8–8)
NEUTS SEG NFR BLD: 55 % (ref 40–73)
NITRITE UR QL STRIP.AUTO: NEGATIVE
PH UR STRIP: 7.5 [PH] (ref 5–8)
PLATELET # BLD AUTO: 123 K/UL (ref 135–420)
PMV BLD AUTO: 9.9 FL (ref 9.2–11.8)
POTASSIUM SERPL-SCNC: 3.5 MMOL/L (ref 3.5–5.5)
PROT SERPL-MCNC: 8 G/DL (ref 6.4–8.2)
PROT UR STRIP-MCNC: NEGATIVE MG/DL
RBC # BLD AUTO: 4.79 M/UL (ref 4.2–5.3)
SODIUM SERPL-SCNC: 141 MMOL/L (ref 136–145)
SP GR UR REFRACTOMETRY: >1.03 (ref 1–1.03)
UROBILINOGEN UR QL STRIP.AUTO: 1 EU/DL (ref 0.2–1)
WBC # BLD AUTO: 7.1 K/UL (ref 4.6–13.2)

## 2020-08-27 PROCEDURE — 74011250636 HC RX REV CODE- 250/636: Performed by: PHYSICIAN ASSISTANT

## 2020-08-27 PROCEDURE — 74177 CT ABD & PELVIS W/CONTRAST: CPT

## 2020-08-27 PROCEDURE — 80053 COMPREHEN METABOLIC PANEL: CPT

## 2020-08-27 PROCEDURE — 85025 COMPLETE CBC W/AUTO DIFF WBC: CPT

## 2020-08-27 PROCEDURE — 99284 EMERGENCY DEPT VISIT MOD MDM: CPT

## 2020-08-27 PROCEDURE — 74011000636 HC RX REV CODE- 636: Performed by: EMERGENCY MEDICINE

## 2020-08-27 PROCEDURE — 81003 URINALYSIS AUTO W/O SCOPE: CPT

## 2020-08-27 RX ORDER — DICYCLOMINE HYDROCHLORIDE 10 MG/1
20 CAPSULE ORAL 3 TIMES DAILY
Qty: 20 CAP | Refills: 0 | Status: SHIPPED | OUTPATIENT
Start: 2020-08-27 | End: 2021-03-30

## 2020-08-27 RX ADMIN — IOPAMIDOL 100 ML: 612 INJECTION, SOLUTION INTRAVENOUS at 20:09

## 2020-08-27 RX ADMIN — SODIUM CHLORIDE 1000 ML: 900 INJECTION, SOLUTION INTRAVENOUS at 19:13

## 2020-08-27 NOTE — ED TRIAGE NOTES
Pt states \" Since Monday I have been having right sided abdomina pain that hurts especially when I push and hurts on the top also when I eat certain things. \"

## 2020-08-27 NOTE — ED PROVIDER NOTES
EMERGENCY DEPARTMENT HISTORY AND PHYSICAL EXAM    Date: 8/27/2020  Patient Name: Alma Delia Waters    History of Presenting Illness     Chief Complaint   Patient presents with    Abdominal Pain         History Provided By: Patient    Alma Delia Waters is a 54 y.o. female with PMHX of WPW, hypertension, GERD, hepatitis C, diverticulitis, tobacco use who presents to the emergency department C/O abdominal pain. Patient reports 3 to 4 days of right-sided abdominal pain. Patient states the pain is in the right lower quadrant seems to be worse with deep breaths or pushing on the area makes her whole abdomen hurt. Pt denies fever, nausea vomiting, diarrhea, constipation, bloody stools, dysuria, back pain, and any other sxs or complaints. PCP: Chandler Ray MD    Current Outpatient Medications   Medication Sig Dispense Refill    dicyclomine (BentyL) 10 mg capsule Take 2 Caps by mouth three (3) times daily. 20 Cap 0    predniSONE (STERAPRED DS) 10 mg dose pack Take as directed 48 Tab 0    chlorzoxazone (PARAFON FORTE DSC) 500 mg tablet Take 1 Tab by mouth four (4) times daily as needed for Muscle Spasm(s). 12 Tab 0    MELOXICAM PO Take  by mouth.  promethazine (PHENERGAN) 25 mg tablet Take 1 Tab by mouth every six (6) hours as needed. As needed for nausea or vomiting 12 Tab 0    gabapentin (NEURONTIN) 300 mg capsule Take 300 mg by mouth every morning.  lisinopril (PRINIVIL, ZESTRIL) 20 mg tablet Take 20 mg by mouth daily.  verapamil SR (CALAN-SR) 180 mg CR tablet Take 180 mg by mouth nightly.          Past History     Past Medical History:  Past Medical History:   Diagnosis Date    Arthritis     GERD (gastroesophageal reflux disease)     Hepatitis C     Hypertension     SVT (supraventricular tachycardia) (HCC)     WPW (Nahomy-Parkinson-White syndrome)        Past Surgical History:  Past Surgical History:   Procedure Laterality Date    HX COLECTOMY      HX KNEE ARTHROSCOPY Right     HX OTHER SURGICAL 1997    Hx knee surgery R knee    HX SVT ABLATION         Family History:  History reviewed. No pertinent family history. Social History:  Social History     Tobacco Use    Smoking status: Light Tobacco Smoker     Packs/day: 0.25     Years: 17.00     Pack years: 4.25     Types: Cigarettes    Smokeless tobacco: Never Used   Substance Use Topics    Alcohol use: Yes     Alcohol/week: 2.0 standard drinks     Types: 2 Glasses of wine per week     Comment: socially    Drug use: Yes     Types: Marijuana     Comment: 1-2 monthly        Allergies:  No Known Allergies      Review of Systems   Review of Systems   Constitutional: Negative for fever. Cardiovascular: Negative for chest pain. Gastrointestinal: Positive for abdominal pain. Negative for blood in stool, constipation, diarrhea, nausea and vomiting. Genitourinary: Negative for dysuria, flank pain and hematuria. Musculoskeletal: Negative for back pain and myalgias. All other systems reviewed and are negative. Physical Exam     Vitals:    08/27/20 1835 08/27/20 1930   BP: 135/82 165/88   Pulse: 74    Resp: 18    Temp: 97.9 °F (36.6 °C)    SpO2: 99% 99%   Weight: 100.2 kg (221 lb)    Height: 5' 5\" (1.651 m)      Physical Exam  Vitals signs and nursing note reviewed. Constitutional:       General: She is not in acute distress. Appearance: She is well-developed. She is obese. She is not ill-appearing. HENT:      Head: Normocephalic and atraumatic. Eyes:      General: No scleral icterus. Extraocular Movements: Extraocular movements intact. Pupils: Pupils are equal, round, and reactive to light. Cardiovascular:      Rate and Rhythm: Normal rate and regular rhythm. Pulmonary:      Effort: Pulmonary effort is normal.      Breath sounds: Normal breath sounds. Abdominal:      General: Abdomen is flat. Bowel sounds are normal. There is no distension. Palpations: Abdomen is soft. Tenderness:  There is abdominal tenderness in the right lower quadrant. There is no right CVA tenderness or left CVA tenderness. Skin:     General: Skin is warm and dry. Capillary Refill: Capillary refill takes less than 2 seconds. Neurological:      General: No focal deficit present. Mental Status: She is alert. Psychiatric:         Mood and Affect: Mood normal.         Behavior: Behavior normal.         Diagnostic Study Results     Labs -     Recent Results (from the past 12 hour(s))   CBC WITH AUTOMATED DIFF    Collection Time: 08/27/20  7:25 PM   Result Value Ref Range    WBC 7.1 4.6 - 13.2 K/uL    RBC 4.79 4.20 - 5.30 M/uL    HGB 13.9 12.0 - 16.0 g/dL    HCT 41.2 35.0 - 45.0 %    MCV 86.0 74.0 - 97.0 FL    MCH 29.0 24.0 - 34.0 PG    MCHC 33.7 31.0 - 37.0 g/dL    RDW 14.2 11.6 - 14.5 %    PLATELET 041 (L) 085 - 420 K/uL    MPV 9.9 9.2 - 11.8 FL    NEUTROPHILS 55 40 - 73 %    LYMPHOCYTES 35 21 - 52 %    MONOCYTES 7 3 - 10 %    EOSINOPHILS 3 0 - 5 %    BASOPHILS 0 0 - 2 %    ABS. NEUTROPHILS 3.8 1.8 - 8.0 K/UL    ABS. LYMPHOCYTES 2.5 0.9 - 3.6 K/UL    ABS. MONOCYTES 0.5 0.05 - 1.2 K/UL    ABS. EOSINOPHILS 0.2 0.0 - 0.4 K/UL    ABS. BASOPHILS 0.0 0.0 - 0.1 K/UL    DF AUTOMATED     METABOLIC PANEL, COMPREHENSIVE    Collection Time: 08/27/20  7:25 PM   Result Value Ref Range    Sodium 141 136 - 145 mmol/L    Potassium 3.5 3.5 - 5.5 mmol/L    Chloride 106 100 - 111 mmol/L    CO2 29 21 - 32 mmol/L    Anion gap 6 3.0 - 18 mmol/L    Glucose 112 (H) 74 - 99 mg/dL    BUN 13 7.0 - 18 MG/DL    Creatinine 0.98 0.6 - 1.3 MG/DL    BUN/Creatinine ratio 13 12 - 20      GFR est AA >60 >60 ml/min/1.73m2    GFR est non-AA 59 (L) >60 ml/min/1.73m2    Calcium 9.1 8.5 - 10.1 MG/DL    Bilirubin, total 0.3 0.2 - 1.0 MG/DL    ALT (SGPT) 36 13 - 56 U/L    AST (SGOT) 35 10 - 38 U/L    Alk.  phosphatase 87 45 - 117 U/L    Protein, total 8.0 6.4 - 8.2 g/dL    Albumin 3.6 3.4 - 5.0 g/dL    Globulin 4.4 (H) 2.0 - 4.0 g/dL    A-G Ratio 0.8 0.8 - 1.7     URINALYSIS W/ RFLX MICROSCOPIC    Collection Time: 08/27/20  8:40 PM   Result Value Ref Range    Color YELLOW      Appearance CLEAR      Specific gravity >1.030 (H) 1.005 - 1.030    pH (UA) 7.5 5.0 - 8.0      Protein Negative NEG mg/dL    Glucose Negative NEG mg/dL    Ketone Negative NEG mg/dL    Bilirubin Negative NEG      Blood Negative NEG      Urobilinogen 1.0 0.2 - 1.0 EU/dL    Nitrites Negative NEG      Leukocyte Esterase Negative NEG         Radiologic Studies -   CT ABD PELV W CONT   Final Result   IMPRESSION:      1. Colonic diverticulosis with prior surgery and without evidence of   diverticulitis. 2.  Cirrhosis. 3.  Periumbilical hernia with nonobstructed small bowel. 4.  Atherosclerotic vascular disease        CT Results  (Last 48 hours)               08/27/20 2028  CT ABD PELV W CONT Final result    Impression:  IMPRESSION:       1. Colonic diverticulosis with prior surgery and without evidence of   diverticulitis. 2.  Cirrhosis. 3.  Periumbilical hernia with nonobstructed small bowel. 4.  Atherosclerotic vascular disease       Narrative:  EXAM: CT of the abdomen and pelvis with contrast 8/27/2020. INDICATION: Right-sided abdominal pain. COMPARISON: CT scan of the abdomen and pelvis from 4/12/2018       TECHNIQUE: Axial CT imaging of the abdomen and pelvis was performed during the   dynamic infusion of 100 cc of Isovue 300. Multiplanar reformats were generated. Dose reduction techniques:  Automated exposure control, mAs and/or kVp   reductions based on patient size, and iterative reconstruction. The specific   techniques utilized on this CT exam have been documented in the patient's   electronic medical record.        Digital imaging and communications and medicine (DICOM) format image data are   available to nonaffiliated external healthcare facilities or entities on a   secure, media free, reciprocally searchable basis with patient authorization for   at least a 12 month period after this study. _______________       FINDINGS:       LIVER, BILIARY: Cirrhotic hepatic morphology is again suggested with a nodular   hepatic contour. A recanalized umbilical vein is again noted. No biliary   dilation. No focal hepatic lesions are identified. The gallbladder is   partially contracted but grossly unremarkable. PANCREAS: WNL. SPLEEN: WNL. ADRENALS: Unremarkable. KIDNEYS: WNL. LYMPH NODES: WNL. PERITONEAL CAVITY AND GASTROINTESTINAL TRACT: No free air or free fluid. No   bowel dilation or wall thickening. Postsurgical changes are noted status post   sigmoid colectomy. Scattered colonic diverticula are present but there are no   CT findings to suggest diverticulitis. A normal-appearing appendix is noted   emanating from the cecal tip. There is a periumbilical hernia containing fat and a nonobstructed loop of small   bowel. VASCULATURE: Scattered vascular calcifications are noted throughout the   abdominal aorta and its branches. LOWER CHEST: Unremarkable. PELVIC VISCERA: The uterus is retroverted and retroflexed. Urinary bladder is   contracted. No abnormal intrapelvic masses or fluid collections are identified. BONES: No acute or aggressive osseous abnormalities are identified. OTHER: None.   _______________               CXR Results  (Last 48 hours)    None          Medications given in the ED-  Medications   sodium chloride 0.9 % bolus infusion 1,000 mL (1,000 mL IntraVENous New Bag 8/27/20 1913)   iopamidoL (ISOVUE 300) 61 % contrast injection  mL (100 mL IntraVENous Given 8/27/20 2009)         Medical Decision Making   I am the first provider for this patient. I reviewed the vital signs, available nursing notes, past medical history, past surgical history, family history and social history. Vital Signs-Reviewed the patient's vital signs.     Pulse Oximetry Analysis - 99% on RA     Records Reviewed: Nursing Notes    Procedures:  Procedures    ED Course:   6:42 PM   Initial assessment performed. The patients presenting problems have been discussed, and they are in agreement with the care plan formulated and outlined with them. I have encouraged them to ask questions as they arise throughout their visit. Discussion: 54 y.o. female 4 days of right-sided abdominal pain. She is afebrile appropriate vital signs laboratory findings unremarkable, CT abdomen pelvis with IV contrast reveals diverticulosis, known liver cirrhosis, and simple umbilical hernia without incarceration or gangrene. Plan for Bent[place] and close PCP follow-up with strict return precautions discussed. Diagnosis and Disposition       DISCHARGE NOTE:  Parker Ratliff's  results have been reviewed with her. She has been counseled regarding her diagnosis, treatment, and plan. She verbally conveys understanding and agreement of the signs, symptoms, diagnosis, treatment and prognosis and additionally agrees to follow up as discussed. She also agrees with the care-plan and conveys that all of her questions have been answered. I have also provided discharge instructions for her that include: educational information regarding their diagnosis and treatment, and list of reasons why they would want to return to the ED prior to their follow-up appointment, should her condition change. She has been provided with education for proper emergency department utilization. CLINICAL IMPRESSION:    1. Right sided abdominal pain    2. Diverticulosis    3. Cirrhosis of liver without ascites, unspecified hepatic cirrhosis type ([address]    4. Umbilical hernia without obstruction and without gangrene        PLAN:  1. D/C Home  2. Current Discharge Medication List      START taking these medications    Details   dicyclomine (BentyL) 10 mg capsule Take 2 Caps by mouth three (3) times daily. Qty: 20 Cap, Refills: 0           3.    Follow-up Information Follow up With Specialties Details Why Contact Info    Alina Levine MD Family Medicine Schedule an appointment as soon as possible for a visit  [address] [place] [address] [facility] EMERGENCY DEPT Emergency Medicine  As needed, If symptoms worsen [address] [id number]  [phone]                  Please note that this dictation was completed with [employer], the computer voice recognition software. Quite often unanticipated grammatical, syntax, homophones, and other interpretive errors are inadvertently transcribed by the computer software. Please disregard these errors. Please excuse any errors that have escaped final proofreading.

## 2020-08-28 NOTE — DISCHARGE INSTRUCTIONS

## 2020-12-09 ENCOUNTER — HOSPITAL ENCOUNTER (EMERGENCY)
Age: 55
Discharge: HOME | End: 2020-12-09
Attending: EMERGENCY MEDICINE
Payer: MEDICARE

## 2020-12-09 VITALS
SYSTOLIC BLOOD PRESSURE: 132 MMHG | RESPIRATION RATE: 18 BRPM | BODY MASS INDEX: 36.78 KG/M2 | HEART RATE: 69 BPM | DIASTOLIC BLOOD PRESSURE: 76 MMHG | TEMPERATURE: 97.8 F | WEIGHT: 221 LBS | OXYGEN SATURATION: 100 %

## 2020-12-09 DIAGNOSIS — M54.12 CERVICAL RADICULOPATHY, ACUTE: Primary | ICD-10-CM

## 2020-12-09 DIAGNOSIS — M62.838 MUSCLE SPASM: ICD-10-CM

## 2020-12-09 PROCEDURE — 99282 EMERGENCY DEPT VISIT SF MDM: CPT

## 2020-12-09 RX ORDER — LIDOCAINE 50 MG/G
PATCH TOPICAL
Qty: 5 EACH | Refills: 0 | Status: SHIPPED | OUTPATIENT
Start: 2020-12-09

## 2020-12-09 RX ORDER — PREDNISONE 10 MG/1
TABLET ORAL
Qty: 21 TAB | Refills: 0 | Status: SHIPPED | OUTPATIENT
Start: 2020-12-09 | End: 2021-03-06

## 2020-12-09 NOTE — ED PROVIDER NOTES
EMERGENCY DEPARTMENT HISTORY AND PHYSICAL EXAM    Date: 12/9/2020  Patient Name: Jose Tinsley    History of Presenting Illness     Chief Complaint   Patient presents with    Shoulder Pain         History Provided By: Patient    5:26 PM  Jose Tinsley is a 54 y.o. female with PMHX of HTN, SVT who presents to the emergency department C/O of left-sided neck pain which patient woke up with 3 days ago. States it felt like she had a crick in her neck, and had difficulty turning her head to the left. Denies any injury or trauma. Also having some mild intermittent tingling sensation in her left fingertips for the past 3 days as well. Patient has chronic low back pain and sciatica for which she takes tizanidine but that has not provided relief. Has also taken ibuprofen without relief. Pt denies extremity numbness or weakness, fall, and any other sxs or complaints. PCP: Lotus Neil MD    Current Outpatient Medications   Medication Sig Dispense Refill    predniSONE (STERAPRED DS) 10 mg dose pack Use per pack instructions. 21 Tab 0    lidocaine (Lidoderm) 5 % Apply patch to the affected area for 12 hours a day and remove for 12 hours a day. 5 Each 0    dicyclomine (BentyL) 10 mg capsule Take 2 Caps by mouth three (3) times daily. 20 Cap 0    promethazine (PHENERGAN) 25 mg tablet Take 1 Tab by mouth every six (6) hours as needed. As needed for nausea or vomiting 12 Tab 0    gabapentin (NEURONTIN) 300 mg capsule Take 300 mg by mouth every morning.  lisinopril (PRINIVIL, ZESTRIL) 20 mg tablet Take 20 mg by mouth daily.  verapamil SR (CALAN-SR) 180 mg CR tablet Take 180 mg by mouth nightly.          Past History     Past Medical History:  Past Medical History:   Diagnosis Date    Arthritis     GERD (gastroesophageal reflux disease)     Hepatitis C     Hypertension     SVT (supraventricular tachycardia) (HCC)     WPW (Nahomy-Parkinson-White syndrome)        Past Surgical History:  Past Surgical History:   Procedure Laterality Date    HX COLECTOMY      HX KNEE ARTHROSCOPY Right     HX OTHER SURGICAL  1997    Hx knee surgery R knee    HX SVT ABLATION         Family History:  History reviewed. No pertinent family history. Social History:  Social History     Tobacco Use    Smoking status: Light Tobacco Smoker     Packs/day: 0.25     Years: 17.00     Pack years: 4.25     Types: Cigarettes    Smokeless tobacco: Never Used   Substance Use Topics    Alcohol use: Yes     Alcohol/week: 2.0 standard drinks     Types: 2 Glasses of wine per week     Comment: socially    Drug use: Yes     Types: Marijuana     Comment: 1-2 monthly        Allergies:  No Known Allergies      Review of Systems   Review of Systems   Constitutional: Negative for fever. Musculoskeletal: Positive for myalgias and neck pain. Skin: Negative. Neurological: Negative for weakness and numbness. All other systems reviewed and are negative. Physical Exam     Vitals:    12/09/20 1654 12/09/20 1655   BP:  132/76   Pulse: 69    Resp: 18    Temp: 97.8 °F (36.6 °C)    SpO2: 100%    Weight: 100.2 kg (221 lb)      Physical Exam  Vitals signs and nursing note reviewed. Constitutional:       General: She is not in acute distress. Appearance: Normal appearance. She is normal weight. HENT:      Head: Normocephalic and atraumatic. Musculoskeletal:        Back:    Skin:     General: Skin is warm and dry. Neurological:      General: No focal deficit present. Mental Status: She is alert and oriented to person, place, and time. Psychiatric:         Mood and Affect: Mood normal.         Behavior: Behavior normal.               Diagnostic Study Results     Labs -   No results found for this or any previous visit (from the past 12 hour(s)).     Radiologic Studies -   No orders to display     CT Results  (Last 48 hours)    None        CXR Results  (Last 48 hours)    None          Medications given in the ED-  Medications - No data to display      Medical Decision Making   I am the first provider for this patient. I reviewed the vital signs, available nursing notes, past medical history, past surgical history, family history and social history. Vital Signs-Reviewed the patient's vital signs. Records Reviewed: Nursing Notes      Procedures:  Procedures    ED Course:  5:26 PM   Initial assessment performed. The patients presenting problems have been discussed, and they are in agreement with the care plan formulated and outlined with them. I have encouraged them to ask questions as they arise throughout their visit. Provider Notes (Medical Decision Making): Janalyn Brunner is a 54 y.o. female presents with left sided neck and trapezius muscle pain with subjective tingling in her left hand x3 days. No injury or trauma. Neurovascular intact with muscular tenderness on exam.  Exam consistent with muscle spasm possible cervical radiculopathy. Will start on short course of steroid taper, continue her tizanidine, Lidoderm patch as needed, warm compresses and light stretching, no heavy lifting. Follow-up with her PCP or Ortho spine specialist if symptoms persist    Diagnosis and Disposition       DISCHARGE NOTE:    Colt Ratliff's  results have been reviewed with her. She has been counseled regarding her diagnosis, treatment, and plan. She verbally conveys understanding and agreement of the signs, symptoms, diagnosis, treatment and prognosis and additionally agrees to follow up as discussed. She also agrees with the care-plan and conveys that all of her questions have been answered. I have also provided discharge instructions for her that include: educational information regarding their diagnosis and treatment, and list of reasons why they would want to return to the ED prior to their follow-up appointment, should her condition change. She has been provided with education for proper emergency department utilization. CLINICAL IMPRESSION:    1. Cervical radiculopathy, acute    2. Muscle spasm        PLAN:  1. D/C Home  2. Current Discharge Medication List      START taking these medications    Details   lidocaine (Lidoderm) 5 % Apply patch to the affected area for 12 hours a day and remove for 12 hours a day. Qty: 5 Each, Refills: 0         CONTINUE these medications which have CHANGED    Details   predniSONE (STERAPRED DS) 10 mg dose pack Use per pack instructions. Qty: 21 Tab, Refills: 0         CONTINUE these medications which have NOT CHANGED    Details   gabapentin (NEURONTIN) 300 mg capsule Take 300 mg by mouth every morning. lisinopril (PRINIVIL, ZESTRIL) 20 mg tablet Take 20 mg by mouth daily. verapamil SR (CALAN-SR) 180 mg CR tablet Take 180 mg by mouth nightly. STOP taking these medications       chlorzoxazone (PARAFON FORTE DSC) 500 mg tablet Comments:   Reason for Stopping:         MELOXICAM PO Comments:   Reason for Stopping:             3.   Follow-up Information     Follow up With Specialties Details Why Contact Info    Esha Galvan MD Family Medicine Schedule an appointment as soon as possible for a visit  [address] [place] [address]      Dheeraj Lo MD Orthopedic Surgery  As needed [address]      [facility] EMERGENCY DEPT Emergency Medicine  As needed, If symptoms worsen [address]  [id number] [phone]        _______________________________      Please note that this dictation was completed with [employer], the computer voice recognition software. Quite often unanticipated grammatical, syntax, homophones, and other interpretive errors are inadvertently transcribed by the computer software. Please disregard these errors. Please excuse any errors that have escaped final proofreading.

## 2020-12-09 NOTE — DISCHARGE INSTRUCTIONS
Patient Education        Pinched Nerve in the Neck: Care Instructions  Your Care Instructions  A pinched nerve in the neck happens when a vertebra or disc in the upper part of your spine is damaged. This damage can happen because of an injury. Or it can just happen with age. The changes caused by the damage may put pressure on a nearby nerve root, pinching it. This causes symptoms such as sharp pain in your neck, shoulder, arm, hand, or back. You may also have tingling or numbness. Sometimes it makes your arm weaker. The symptoms are usually worse when you turn your head or strain your neck. For many people, the symptoms get better over time and finally go away. Early treatment usually includes medicines for pain and swelling. Sometimes physical therapy and special exercises may help. Follow-up care is a key part of your treatment and safety. Be sure to make and go to all appointments, and call your doctor if you are having problems. It's also a good idea to know your test results and keep a list of the medicines you take. How can you care for yourself at home? · Be safe with medicines. Read and follow all instructions on the label. ¨ If the doctor gave you a prescription medicine for pain, take it as prescribed. ¨ If you are not taking a prescription pain medicine, ask your doctor if you can take an over-the-counter medicine. · Try using a heating pad on a low or medium setting for 15 to 20 minutes every 2 or 3 hours. Try a warm shower in place of one session with the heating pad. You can also buy single-use heat wraps that last up to 8 hours. · You can also try an ice pack for 10 to 15 minutes every 2 to 3 hours. There isn't strong evidence that either heat or ice will help. But you can try them to see if they help you. · Don't spend too long in one position. Take short breaks to move around and change positions. · Wear a seat belt and shoulder harness when you are in a car.   · Sleep with a pillow under your head and neck that keeps your neck straight. · If you were given a neck brace (cervical collar) to limit neck motion, wear it as instructed for as many days as your doctor tells you to. Do not wear it longer than you were told to. Wearing a brace for too long can lead to neck stiffness and can weaken the neck muscles. · Follow your doctor's instructions for gentle neck-stretching exercises. · Do not smoke. Smoking can slow healing of your discs. If you need help quitting, talk to your doctor about stop-smoking programs and medicines. These can increase your chances of quitting for good. · Avoid strenuous work or exercise until your doctor says it is okay. When should you call for help? Call 911 anytime you think you may need emergency care. For example, call if:  ? · You are unable to move an arm or a leg at all. ?Call your doctor now or seek immediate medical care if:  ? · You have new or worse symptoms in your arms, legs, chest, belly, or buttocks. Symptoms may include:  ¨ Numbness or tingling. ¨ Weakness. ¨ Pain. ? · You lose bladder or bowel control. ? Watch closely for changes in your health, and be sure to contact your doctor if:  ? · You are not getting better as expected. Where can you learn more? Go to http://www.gray.com/. Enter F970 in the search box to learn more about \"Pinched Nerve in the Neck: Care Instructions. \"  Current as of: March 21, 2017  Content Version: 11.5  © 9304-3651 [employer]. Care instructions adapted under license by [employer] (which disclaims liability or warranty for this information). If you have questions about a medical condition or this instruction, always ask your healthcare professional. [address] any warranty or liability for your use of this information. Patient Education        Neck Spasm: Exercises  Introduction  Here are some examples of exercises for you to try. The exercises may be suggested for a condition or for rehabilitation. Start each exercise slowly. Ease off the exercises if you start to have pain. You will be told when to start these exercises and which ones will work best for you. How to do the exercises  Levator scapula stretch   1. Sit in a firm chair, or stand up straight. 2. Gently tilt your head toward your left shoulder. 3. Turn your head to look down into your armpit, bending your head slightly forward. Let the weight of your head stretch your neck muscles. 4. Hold for 15 to 30 seconds. 5. Return to your starting position. 6. Follow the same instructions above, but tilt your head toward your right shoulder. 7. Repeat 2 to 4 times toward each shoulder. Upper trapezius stretch   1. Sit in a firm chair, or stand up straight. 2. This stretch works best if you keep your shoulder down as you lean away from it. To help you remember to do this, start by relaxing your shoulders and lightly holding on to your thighs or your chair. 3. Tilt your head toward your shoulder and hold for 15 to 30 seconds. Let the weight of your head stretch your muscles. 4. If you would like a little added stretch, place your arm behind your back. Use the arm opposite of the direction you are tilting your head. For example, if you are tilting your head to the left, place your right arm behind your back. 5. Repeat 2 to 4 times toward each shoulder. Neck rotation   1. Sit in a firm chair, or stand up straight. 2. Keeping your chin level, turn your head to the right, and hold for 15 to 30 seconds. 3. Turn your head to the left, and hold for 15 to 30 seconds. 4. Repeat 2 to 4 times to each side. Chin tuck   1. Lie on the floor with a rolled-up towel under your neck. Your head should be touching the floor. 2. Slowly bring your chin toward the front of your neck. 3. Hold for a count of 6, and then relax for up to 10 seconds. 4. Repeat 8 to 12 times.     Forward neck flexion   1. Sit in a firm chair, or stand up straight. 2. Bend your head forward. 3. Hold for 15 to 30 seconds, then return to your starting position. 4. Repeat 2 to 4 times. Follow-up care is a key part of your treatment and safety. Be sure to make and go to all appointments, and call your doctor if you are having problems. It's also a good idea to know your test results and keep a list of the medicines you take. Where can you learn more? Go to http://www.gray.com/  Enter P962 in the search box to learn more about \"Neck Spasm: Exercises. \"  Current as of: March 2, 2020               Content Version: 12.6  © 4384-7723 [employer]. Care instructions adapted under license by [employer] (which disclaims liability or warranty for this information). If you have questions about a medical condition or this instruction, always ask your healthcare professional. [address] any warranty or liability for your use of this information.

## 2021-03-06 ENCOUNTER — APPOINTMENT (OUTPATIENT)
Dept: GENERAL RADIOLOGY | Age: 56
End: 2021-03-06
Attending: EMERGENCY MEDICINE
Payer: MEDICARE

## 2021-03-06 ENCOUNTER — HOSPITAL ENCOUNTER (EMERGENCY)
Age: 56
Discharge: HOME | End: 2021-03-06
Attending: EMERGENCY MEDICINE
Payer: MEDICARE

## 2021-03-06 VITALS
TEMPERATURE: 97.3 F | OXYGEN SATURATION: 95 % | HEIGHT: 65 IN | SYSTOLIC BLOOD PRESSURE: 128 MMHG | DIASTOLIC BLOOD PRESSURE: 86 MMHG | HEART RATE: 100 BPM | BODY MASS INDEX: 34.16 KG/M2 | WEIGHT: 205 LBS | RESPIRATION RATE: 25 BRPM

## 2021-03-06 DIAGNOSIS — J18.9 COMMUNITY ACQUIRED PNEUMONIA, UNSPECIFIED LATERALITY: Primary | ICD-10-CM

## 2021-03-06 DIAGNOSIS — D72.829 LEUKOCYTOSIS, UNSPECIFIED TYPE: ICD-10-CM

## 2021-03-06 DIAGNOSIS — Z20.822 SUSPECTED COVID-19 VIRUS INFECTION: ICD-10-CM

## 2021-03-06 LAB
ALBUMIN SERPL-MCNC: 3.6 G/DL (ref 3.4–5)
ALBUMIN/GLOB SERPL: 0.8 {RATIO} (ref 0.8–1.7)
ALP SERPL-CCNC: 81 U/L (ref 45–117)
ALT SERPL-CCNC: 41 U/L (ref 13–56)
ANION GAP SERPL CALC-SCNC: 7 MMOL/L (ref 3–18)
AST SERPL-CCNC: 60 U/L (ref 10–38)
ATRIAL RATE: 95 BPM
BASOPHILS # BLD: 0 K/UL (ref 0–0.1)
BASOPHILS NFR BLD: 0 % (ref 0–2)
BILIRUB SERPL-MCNC: 1.5 MG/DL (ref 0.2–1)
BUN SERPL-MCNC: 22 MG/DL (ref 7–18)
BUN/CREAT SERPL: 18 (ref 12–20)
CALCIUM SERPL-MCNC: 9.6 MG/DL (ref 8.5–10.1)
CALCULATED P AXIS, ECG09: 60 DEGREES
CALCULATED R AXIS, ECG10: 39 DEGREES
CALCULATED T AXIS, ECG11: 53 DEGREES
CHLORIDE SERPL-SCNC: 106 MMOL/L (ref 100–111)
CO2 SERPL-SCNC: 25 MMOL/L (ref 21–32)
CREAT SERPL-MCNC: 1.24 MG/DL (ref 0.6–1.3)
DIAGNOSIS, 93000: NORMAL
DIFFERENTIAL METHOD BLD: ABNORMAL
EOSINOPHIL # BLD: 0 K/UL (ref 0–0.4)
EOSINOPHIL NFR BLD: 0 % (ref 0–5)
ERYTHROCYTE [DISTWIDTH] IN BLOOD BY AUTOMATED COUNT: 13.7 % (ref 11.6–14.5)
FLUAV AG NPH QL IA: NEGATIVE
FLUBV AG NOSE QL IA: NEGATIVE
GLOBULIN SER CALC-MCNC: 4.7 G/DL (ref 2–4)
GLUCOSE SERPL-MCNC: 98 MG/DL (ref 74–99)
HCT VFR BLD AUTO: 40.7 % (ref 35–45)
HGB BLD-MCNC: 13.5 G/DL (ref 12–16)
LIPASE SERPL-CCNC: 57 U/L (ref 73–393)
LYMPHOCYTES # BLD: 1.1 K/UL (ref 0.9–3.6)
LYMPHOCYTES NFR BLD: 6 % (ref 21–52)
MCH RBC QN AUTO: 28.5 PG (ref 24–34)
MCHC RBC AUTO-ENTMCNC: 33.2 G/DL (ref 31–37)
MCV RBC AUTO: 86 FL (ref 74–97)
MONOCYTES # BLD: 1.3 K/UL (ref 0.05–1.2)
MONOCYTES NFR BLD: 7 % (ref 3–10)
NEUTS SEG # BLD: 16.6 K/UL (ref 1.8–8)
NEUTS SEG NFR BLD: 87 % (ref 40–73)
P-R INTERVAL, ECG05: 126 MS
PLATELET # BLD AUTO: 96 K/UL (ref 135–420)
PMV BLD AUTO: 10.3 FL (ref 9.2–11.8)
POTASSIUM SERPL-SCNC: 3.4 MMOL/L (ref 3.5–5.5)
PROT SERPL-MCNC: 8.3 G/DL (ref 6.4–8.2)
Q-T INTERVAL, ECG07: 386 MS
QRS DURATION, ECG06: 84 MS
QTC CALCULATION (BEZET), ECG08: 485 MS
RBC # BLD AUTO: 4.73 M/UL (ref 4.2–5.3)
SARS-COV-2, COV2: NORMAL
SODIUM SERPL-SCNC: 138 MMOL/L (ref 136–145)
VENTRICULAR RATE, ECG03: 95 BPM
WBC # BLD AUTO: 18.9 K/UL (ref 4.6–13.2)

## 2021-03-06 PROCEDURE — 74011250636 HC RX REV CODE- 250/636: Performed by: EMERGENCY MEDICINE

## 2021-03-06 PROCEDURE — 96375 TX/PRO/DX INJ NEW DRUG ADDON: CPT

## 2021-03-06 PROCEDURE — 74011000250 HC RX REV CODE- 250: Performed by: EMERGENCY MEDICINE

## 2021-03-06 PROCEDURE — U0005 INFEC AGEN DETEC AMPLI PROBE: HCPCS

## 2021-03-06 PROCEDURE — 83690 ASSAY OF LIPASE: CPT

## 2021-03-06 PROCEDURE — 99284 EMERGENCY DEPT VISIT MOD MDM: CPT

## 2021-03-06 PROCEDURE — 85025 COMPLETE CBC W/AUTO DIFF WBC: CPT

## 2021-03-06 PROCEDURE — 96365 THER/PROPH/DIAG IV INF INIT: CPT

## 2021-03-06 PROCEDURE — 93005 ELECTROCARDIOGRAM TRACING: CPT

## 2021-03-06 PROCEDURE — 71045 X-RAY EXAM CHEST 1 VIEW: CPT

## 2021-03-06 PROCEDURE — 87804 INFLUENZA ASSAY W/OPTIC: CPT

## 2021-03-06 PROCEDURE — 80053 COMPREHEN METABOLIC PANEL: CPT

## 2021-03-06 RX ORDER — PREDNISONE 20 MG/1
TABLET ORAL
Qty: 21 TAB | Refills: 0 | OUTPATIENT
Start: 2021-03-06 | End: 2021-03-30

## 2021-03-06 RX ORDER — MAGNESIUM SULFATE HEPTAHYDRATE 40 MG/ML
2 INJECTION, SOLUTION INTRAVENOUS ONCE
Status: COMPLETED | OUTPATIENT
Start: 2021-03-06 | End: 2021-03-06

## 2021-03-06 RX ORDER — KETOROLAC TROMETHAMINE 30 MG/ML
30 INJECTION, SOLUTION INTRAMUSCULAR; INTRAVENOUS
Status: COMPLETED | OUTPATIENT
Start: 2021-03-06 | End: 2021-03-06

## 2021-03-06 RX ORDER — KETOROLAC TROMETHAMINE 10 MG/1
10 TABLET, FILM COATED ORAL
Qty: 20 TAB | Refills: 0 | Status: SHIPPED | OUTPATIENT
Start: 2021-03-06 | End: 2021-03-11

## 2021-03-06 RX ORDER — ONDANSETRON 2 MG/ML
4 INJECTION INTRAMUSCULAR; INTRAVENOUS
Status: COMPLETED | OUTPATIENT
Start: 2021-03-06 | End: 2021-03-06

## 2021-03-06 RX ORDER — ALBUTEROL SULFATE 90 UG/1
1 AEROSOL, METERED RESPIRATORY (INHALATION)
Qty: 1 INHALER | Refills: 0 | Status: SHIPPED | OUTPATIENT
Start: 2021-03-06

## 2021-03-06 RX ORDER — ACETAMINOPHEN, DEXTROMETHORPHAN HYDROBROMIDE, GUAIFENESIN, AND PHENYLEPHRINE HYDROCHLORIDE 325; 10; 200; 5 MG/15ML; MG/15ML; MG/15ML; MG/15ML
30 SOLUTION ORAL EVERY 6 HOURS
Qty: 1 BOTTLE | Refills: 0 | Status: SHIPPED | OUTPATIENT
Start: 2021-03-06 | End: 2021-03-07 | Stop reason: RX

## 2021-03-06 RX ORDER — LEVOFLOXACIN 750 MG/1
750 TABLET ORAL DAILY
Qty: 7 TAB | Refills: 0 | Status: SHIPPED | OUTPATIENT
Start: 2021-03-07 | End: 2021-03-14

## 2021-03-06 RX ADMIN — CEFTRIAXONE 1 G: 1 INJECTION, POWDER, FOR SOLUTION INTRAMUSCULAR; INTRAVENOUS at 14:00

## 2021-03-06 RX ADMIN — ONDANSETRON 4 MG: 2 INJECTION INTRAMUSCULAR; INTRAVENOUS at 12:21

## 2021-03-06 RX ADMIN — AZITHROMYCIN MONOHYDRATE 500 MG: 500 INJECTION, POWDER, LYOPHILIZED, FOR SOLUTION INTRAVENOUS at 14:00

## 2021-03-06 RX ADMIN — KETOROLAC TROMETHAMINE 30 MG: 30 INJECTION, SOLUTION INTRAMUSCULAR at 12:21

## 2021-03-06 RX ADMIN — SODIUM CHLORIDE 1000 ML: 900 INJECTION, SOLUTION INTRAVENOUS at 12:21

## 2021-03-06 RX ADMIN — MAGNESIUM SULFATE IN WATER 2 G: 40 INJECTION, SOLUTION INTRAVENOUS at 12:30

## 2021-03-06 RX ADMIN — METHYLPREDNISOLONE SODIUM SUCCINATE 125 MG: 125 INJECTION, POWDER, FOR SOLUTION INTRAMUSCULAR; INTRAVENOUS at 14:00

## 2021-03-06 NOTE — ED NOTES
Bedside shift change report given to FRANK Niño (oncoming nurse) by Sara Campos RN   (offgoing nurse). Report included the following information SBAR, Kardex and ED Summary.

## 2021-03-06 NOTE — DISCHARGE INSTRUCTIONS
Take Levaquin as antibiotic once a day  Take Tylenol cold and flu as well as Toradol for body aches and congestion  Use albuterol for cough  Take prednisone for lung inflammation

## 2021-03-06 NOTE — ED PROVIDER NOTES
EMERGENCY DEPARTMENT HISTORY AND PHYSICAL EXAM    Date: 3/6/2021  Patient Name: Jayne Frank    History of Presenting Illness     Chief Complaint   Patient presents with    Concern For [id number] (Coronavirus)         History Provided By: Patient    Jayne Frank is a 54 y.o. female who presents to the emergency department C/O headache, body aches, cough, chills, nausea and vomiting, fatigue. Patient states her symptoms started yesterday. She states she is not had any known direct contact with a coronavirus patient. She states she does have history of bronchitis. States she used to be a smoker. Denies any chest pain. She does admit to mild shortness of breath. No other complaints. PCP: Jyotsna Rubi MD    Current Facility-Administered Medications   Medication Dose Route Frequency Provider Last Rate Last Admin    azithromycin (ZITHROMAX) 500 mg in 0.9% sodium chloride 250 mL (VIAL-MATE)  500 mg IntraVENous NOW Camden Tony, DO   500 mg at 03/06/21 1400     Current Outpatient Medications   Medication Sig Dispense Refill    [START ON 3/7/2021] levoFLOXacin (Levaquin) 750 mg tablet Take 1 Tab by mouth daily for 7 days. 7 Tab 0    albuterol (PROVENTIL HFA, VENTOLIN HFA, PROAIR HFA) 90 mcg/actuation inhaler Take 1 Puff by inhalation every four (4) hours as needed for Wheezing. 1 Inhaler 0    predniSONE (DELTASONE) 20 mg tablet Take 3 pills for 2 days, 2.5 pills for 2 days, 2 pills for 2 days, 1.5 pills for 2 days, 1 pills for 2 days, 1/2 pill for 2 days 21 Tab 0    Jyqgfshfv-VP-Nmmaxzyb-Guaifen (Tylenol Cold and Flu Severe) 5--200 mg/15 mL liqd Take 30 mL by mouth every six (6) hours. 1 Bottle 0    ketorolac (TORADOL) 10 mg tablet Take 1 Tab by mouth every six (6) hours as needed for Pain for up to 5 days. 20 Tab 0    gabapentin (NEURONTIN) 300 mg capsule Take 300 mg by mouth every morning.  lisinopril (PRINIVIL, ZESTRIL) 20 mg tablet Take 20 mg by mouth daily.       verapamil SR (CALAN-SR) 180 mg CR tablet Take 180 mg by mouth nightly.  lidocaine (Lidoderm) 5 % Apply patch to the affected area for 12 hours a day and remove for 12 hours a day. 5 Each 0    dicyclomine (BentyL) 10 mg capsule Take 2 Caps by mouth three (3) times daily. 20 Cap 0    promethazine (PHENERGAN) 25 mg tablet Take 1 Tab by mouth every six (6) hours as needed. As needed for nausea or vomiting 12 Tab 0       Past History     Past Medical History:  Past Medical History:   Diagnosis Date    Arthritis     GERD (gastroesophageal reflux disease)     Hepatitis C     Hypertension     SVT (supraventricular tachycardia) (HCC)     WPW (Nahomy-Parkinson-White syndrome)        Past Surgical History:  Past Surgical History:   Procedure Laterality Date    HX KNEE ARTHROSCOPY Right     HX OTHER SURGICAL  1997    Hx knee surgery R knee    HX SVT ABLATION      HX TOTAL COLECTOMY         Family History:  History reviewed. No pertinent family history. Social History:  Social History     Tobacco Use    Smoking status: Former Smoker     Packs/day: 0.25     Years: 17.00     Pack years: 4.25     Types: Cigarettes    Smokeless tobacco: Never Used   Substance Use Topics    Alcohol use: Yes     Alcohol/week: 2.0 standard drinks     Types: 2 Glasses of wine per week     Comment: socially    Drug use: Yes     Types: Marijuana     Comment: 1-2 monthly        Allergies:  No Known Allergies      Review of Systems   Review of Systems   Constitutional: Positive for chills and fatigue. Negative for fever. HENT: Positive for congestion and rhinorrhea. Respiratory: Positive for cough and shortness of breath. Negative for wheezing. Cardiovascular: Positive for chest pain. Gastrointestinal: Positive for nausea and vomiting. Negative for abdominal pain and constipation. Genitourinary: Negative for hematuria. All other systems reviewed and are negative. All other systems reviewed and are negative.     Physical Exam Vitals:    03/06/21 1136 03/06/21 1300 03/06/21 1401   BP: (!) 103/53 110/82 124/85   Pulse: (!) 105 96 97   Resp: 24 28 20   Temp: 97.3 °F (36.3 °C)     SpO2: 95% 96% 97%   Weight: 93 kg (205 lb)     Height: 5' 5\" (1.651 m)       Physical Exam    Nursing notes and vital signs reviewed    Airway: intact, speaking normally  Breathing: Mild distress, no cyanosis  Circulation: Peripheral pulses equal    Constitutional: Non toxic appearing, mild distress, appearing stated age  Head: Normocephalic, Atraumatic  Eyes: PERRL, EOMI, No conjunctival injection  Ears: external ears normal  Nose: No rhinorrhea, external nose normal  Throat: mucous membranes moist  Neck: symmetric, trachea midline, no obvious swelling, no JVD, no obvious deformity  Cardiovascular: Mild tachycardia, regular rhythm, no murmurs  Chest: No palpable tenderness, structural deformity or crepitus  Lungs: Mild coarse end expiratory wheezes bilaterally, No stridor, mild increased work of breathing and chest excursion bilaterally  Abdomen: Soft, non tender, non distended, normoactive bowel sounds, No rigidity, no peritoneal signs  Musculoskeletal:  No evidence of obvious deformity to the back, extremities, no LE edema  Skin: Warm, dry, No obvious rashes  Neuro: Alert and oriented x 3, CN 2-12 intact, normal speech, strength and sensation full and symmetric bilaterally  Psychiatric: Normal mood and affect      Diagnostic Study Results     Labs -     Recent Results (from the past 72 hour(s))   EKG, 12 LEAD, INITIAL    Collection Time: 03/06/21 12:03 PM   Result Value Ref Range    Ventricular Rate 95 BPM    Atrial Rate 95 BPM    P-R Interval 126 ms    QRS Duration 84 ms    Q-T Interval 386 ms    QTC Calculation (Bezet) 485 ms    Calculated P Axis 60 degrees    Calculated R Axis 39 degrees    Calculated T Axis 53 degrees    Diagnosis       Normal sinus rhythm  Possible Left atrial enlargement  Left ventricular hypertrophy  Cannot rule out Septal infarct , age undetermined  Abnormal ECG  When compared with ECG of 25-APR-2019 15:20,  Vent. rate has increased BY  36 BPM  ST no longer elevated in Inferior leads  ST no longer elevated in Anterolateral leads  Confirmed by Natty Last MD. ([id number]) on 3/6/2021 2:12:30 PM     CBC WITH AUTOMATED DIFF    Collection Time: 03/06/21 12:30 PM   Result Value Ref Range    WBC 18.9 (H) 4.6 - 13.2 K/uL    RBC 4.73 4.20 - 5.30 M/uL    HGB 13.5 12.0 - 16.0 g/dL    HCT 40.7 35.0 - 45.0 %    MCV 86.0 74.0 - 97.0 FL    MCH 28.5 24.0 - 34.0 PG    MCHC 33.2 31.0 - 37.0 g/dL    RDW 13.7 11.6 - 14.5 %    PLATELET 96 (L) 216 - 420 K/uL    MPV 10.3 9.2 - 11.8 FL    NEUTROPHILS 87 (H) 40 - 73 %    LYMPHOCYTES 6 (L) 21 - 52 %    MONOCYTES 7 3 - 10 %    EOSINOPHILS 0 0 - 5 %    BASOPHILS 0 0 - 2 %    ABS. NEUTROPHILS 16.6 (H) 1.8 - 8.0 K/UL    ABS. LYMPHOCYTES 1.1 0.9 - 3.6 K/UL    ABS. MONOCYTES 1.3 (H) 0.05 - 1.2 K/UL    ABS. EOSINOPHILS 0.0 0.0 - 0.4 K/UL    ABS. BASOPHILS 0.0 0.0 - 0.1 K/UL    DF AUTOMATED     METABOLIC PANEL, COMPREHENSIVE    Collection Time: 03/06/21 12:30 PM   Result Value Ref Range    Sodium 138 136 - 145 mmol/L    Potassium 3.4 (L) 3.5 - 5.5 mmol/L    Chloride 106 100 - 111 mmol/L    CO2 25 21 - 32 mmol/L    Anion gap 7 3.0 - 18 mmol/L    Glucose 98 74 - 99 mg/dL    BUN 22 (H) 7.0 - 18 MG/DL    Creatinine 1.24 0.6 - 1.3 MG/DL    BUN/Creatinine ratio 18 12 - 20      GFR est AA 54 (L) >60 ml/min/1.73m2    GFR est non-AA 45 (L) >60 ml/min/1.73m2    Calcium 9.6 8.5 - 10.1 MG/DL    Bilirubin, total 1.5 (H) 0.2 - 1.0 MG/DL    ALT (SGPT) 41 13 - 56 U/L    AST (SGOT) 60 (H) 10 - 38 U/L    Alk.  phosphatase 81 45 - 117 U/L    Protein, total 8.3 (H) 6.4 - 8.2 g/dL    Albumin 3.6 3.4 - 5.0 g/dL    Globulin 4.7 (H) 2.0 - 4.0 g/dL    A-G Ratio 0.8 0.8 - 1.7     SARS-COV-2    Collection Time: 03/06/21 12:30 PM   Result Value Ref Range    SARS-CoV-2 Please find results under separate order     LIPASE    Collection Time: 03/06/21 12:30 PM Result Value Ref Range    Lipase 57 (L) 73 - 393 U/L   INFLUENZA A & B AG (RAPID TEST)    Collection Time: 03/06/21 12:30 PM   Result Value Ref Range    Influenza A Antigen Negative NEG      Influenza B Antigen Negative NEG         Radiologic Studies -   XR CHEST PORT   Final Result      Low lung volumes, no active disease. CT Results  (Last 48 hours)    None        CXR Results  (Last 48 hours)               03/06/21 1249  XR CHEST PORT Final result    Impression:      Low lung volumes, no active disease. Narrative:  EXAM: CHEST RADIOGRAPH, SINGLE VIEW       CLINICAL INDICATION/HISTORY: cough        <Additional:  Cough and generalized bodyaches       COMPARISON: 4/25/2019       TECHNIQUE: Portable frontal view of the chest was obtained.        _______________       FINDINGS:       SUPPORT DEVICES: None. HEART AND MEDIASTINUM: Cardiac silhouette is top normal in size. Mild calcified   plaque is present at the arch. LUNGS AND PLEURAL SPACES: Pulmonary vessels are normal. Lung volumes are low   which crowds the basal markings. No definite consolidation, pneumothorax or   pleural effusion. BONY THORAX AND SOFT TISSUES: No acute osseous abnormality.        _______________                 Medications given in the ED-  Medications   azithromycin (ZITHROMAX) 500 mg in 0.9% sodium chloride 250 mL (VIAL-MATE) (500 mg IntraVENous Given 3/6/21 1400)   sodium chloride 0.9 % bolus infusion 1,000 mL (0 mL IntraVENous IV Completed 3/6/21 1321)   ondansetron (ZOFRAN) injection 4 mg (4 mg IntraVENous Given 3/6/21 1221)   ketorolac (TORADOL) injection 30 mg (30 mg IntraVENous Given 3/6/21 1221)   magnesium sulfate 2 g/50 ml IVPB (premix or compounded) (0 g IntraVENous IV Completed 3/6/21 1330)   cefTRIAXone (ROCEPHIN) 1 g in sterile water (preservative free) 10 mL IV syringe (1 g IntraVENous Given 3/6/21 1400)   methylPREDNISolone (PF) (Solu-MEDROL) injection 125 mg (125 mg IntraVENous Given 3/6/21 1400)         Medical Decision Making     I reviewed the vital signs, available nursing notes, past medical history, past surgical history, family history and social history. Vital Signs interpretation- I have reviewed the patient's vital signs. Pulse Oximetry interpretation - 99% on Room air     Cardiac Monitor interpretation:  Rate: 105 bpm  Rhythm: sinus    EKG interpretation preliminary  Rate 95 normal sinus rhythm, normal axis, no specific ST changes, LVH    Records Reviewed: Nursing Notes and Old Medical Records    Procedures:  Procedures    ED Course & MDM:   Patient symptoms could be related to Covid versus pneumonia versus other type of respiratory infection. Will obtain blood work, chest x-ray Covid swab and influenza swab    Laboratory findings showing significant leukocytosis. Chest x-ray shows possible streaking infiltrates in the right lung. Will cover for community-acquired pneumonia with Rocephin and azithromycin and give Toradol and Solu-Medrol and magnesium for her bronchospasm    Discussed with the patient the results of her laboratory findings and imaging studies. I stated that her symptoms still could be related to coronavirus but considering her elevated white blood cell count will cover with antibiotics. Recommended to continue with inhalers and other prescribed medications as directed and come back to emergency department if symptoms worsen otherwise follow-up with her primary care physician. She understands and agrees to plan    Diagnosis and Disposition       DISCHARGE NOTE:    Jf Ratliff's  results have been reviewed with her. She has been counseled regarding her diagnosis, treatment, and plan. She verbally conveys understanding and agreement of the signs, symptoms, diagnosis, treatment and prognosis and additionally agrees to follow up as discussed. She also agrees with the care-plan and conveys that all of her questions have been answered.   I have also provided discharge instructions for her that include: educational information regarding their diagnosis and treatment, and list of reasons why they would want to return to the ED prior to their follow-up appointment, should her condition change. She has been provided with education for proper emergency department utilization. CLINICAL IMPRESSION:    1. Community acquired pneumonia, unspecified laterality    2. Suspected COVID-19 virus infection    3. Leukocytosis, unspecified type        PLAN:  1. D/C Home  2. Current Discharge Medication List      START taking these medications    Details   levoFLOXacin (Levaquin) 750 mg tablet Take 1 Tab by mouth daily for 7 days. Qty: 7 Tab, Refills: 0      albuterol (PROVENTIL HFA, VENTOLIN HFA, PROAIR HFA) 90 mcg/actuation inhaler Take 1 Puff by inhalation every four (4) hours as needed for Wheezing. Qty: 1 Inhaler, Refills: 0      predniSONE (DELTASONE) 20 mg tablet Take 3 pills for 2 days, 2.5 pills for 2 days, 2 pills for 2 days, 1.5 pills for 2 days, 1 pills for 2 days, 1/2 pill for 2 days  Qty: 21 Tab, Refills: 0      Pfzrizbnm-AC-Suleccwm-Guaifen (Tylenol Cold and Flu Severe) 5--200 mg/15 mL liqd Take 30 mL by mouth every six (6) hours. Qty: 1 Bottle, Refills: 0      ketorolac (TORADOL) 10 mg tablet Take 1 Tab by mouth every six (6) hours as needed for Pain for up to 5 days. Qty: 20 Tab, Refills: 0           3. Follow-up Information     Follow up With Specialties Details Why Contact Info    Roberto Curry MD Family Medicine   [address] [facility] [address] [facility] [address] [facility] EMERGENCY DEPT Emergency Medicine Go to  If symptoms worsen [address] [place] [id number]  [phone]        _______________________________      Please note that this dictation was completed with [employer], the [email or web address] voice recognition software.   Quite often unanticipated grammatical, syntax, homophones, and other interpretive errors are inadvertently transcribed by the computer software. Please disregard these errors. Please excuse any errors that have escaped final proofreading.

## 2021-03-07 ENCOUNTER — PATIENT OUTREACH (OUTPATIENT)
Dept: CASE MANAGEMENT | Age: 56
End: 2021-03-07

## 2021-03-07 LAB — SARS-COV-2, COV2NT: NOT DETECTED

## 2021-03-07 NOTE — PROGRESS NOTES
Patient contacted regarding recent discharge and COVID-19 risk. Discussed COVID-19 related testing which was available at this time. Test results were negative. Patient informed of results, if available? yes    Outreach made within 2 business days of discharge: Yes    Care Transition Nurse contacted the patient by telephone to perform post discharge assessment. Verified name and  with patient as identifiers. Patient has following risk factors of: pneumonia and immunocompromised. CTN reviewed discharge instructions, medical action plan and red flags related to discharge diagnosis. Reviewed and educated them on any new and changed medications related to discharge diagnosis. Patient reports she feels better today. She has a dry cough, occasionally productive of brownish/red mucous, and minimal SOB. She reports all of these symptoms are better today. She did have some sweating overnight, but has not checked a temp today and does not feel like she has a fever. Advised her to contact PCP office tomorrow to schedule an ED follow up and she verbalized understanding. Advance Care Planning:   Does patient have an Advance Directive: not on file    Education provided regarding infection prevention, and signs and symptoms of COVID-19 and when to seek medical attention with patient who verbalized understanding. Discussed exposure protocols and quarantine from [address] you at higher risk for severe illness 2019 and given an opportunity for questions and concerns. The patient declines the COVID-19 hotline [phone], but agrees to contact PCP office for questions related to their healthcare. CTN provided contact information for future reference. From CDC: Are you at higher risk for severe illness?  Wash your hands often.  Avoid close contact (6 feet, which is about two arm lengths) with people who are sick.    Put distance between yourself and other people if COVID-19 is spreading in your community.  Clean and disinfect frequently touched surfaces.  Avoid all cruise travel and non-essential air travel.  Call your healthcare professional if you have concerns about COVID-19 and your underlying condition or if you are sick. For more information on steps you can take to protect yourself, see CDC's How to Protect Yourself      Patient/family/caregiver given information for [facility] and agrees to enroll no      Plan for follow up call within the next 14 days based on severity of symptoms and risk factors.

## 2021-03-22 ENCOUNTER — PATIENT OUTREACH (OUTPATIENT)
Dept: CASE MANAGEMENT | Age: 56
End: 2021-03-22

## 2021-03-22 NOTE — PROGRESS NOTES
Patient resolved from Transitions of Care episode on 03/22/21 . Patient/family has been provided the following resources and education related to COVID-19:                         Signs, symptoms and red flags related to COVID-19            CDC exposure and quarantine guidelines            Conduit exposure contact - [phone]            Contact for their local Department of Health                 Patient currently reports that the following symptoms have improved:  cough and shortness of breath. No further outreach scheduled with this CTN. Episode of Care resolved. Patient has this CTN contact information if future needs arise.

## 2021-03-30 ENCOUNTER — APPOINTMENT (OUTPATIENT)
Dept: GENERAL RADIOLOGY | Age: 56
End: 2021-03-30
Attending: PHYSICIAN ASSISTANT
Payer: MEDICARE

## 2021-03-30 ENCOUNTER — HOSPITAL ENCOUNTER (EMERGENCY)
Age: 56
Discharge: HOME | End: 2021-03-30
Attending: EMERGENCY MEDICINE
Payer: MEDICARE

## 2021-03-30 VITALS
WEIGHT: 220 LBS | HEART RATE: 63 BPM | SYSTOLIC BLOOD PRESSURE: 189 MMHG | OXYGEN SATURATION: 100 % | RESPIRATION RATE: 20 BRPM | HEIGHT: 65 IN | TEMPERATURE: 98.2 F | DIASTOLIC BLOOD PRESSURE: 95 MMHG | BODY MASS INDEX: 36.65 KG/M2

## 2021-03-30 DIAGNOSIS — M43.02 CERVICAL SPONDYLOLYSIS: Primary | ICD-10-CM

## 2021-03-30 DIAGNOSIS — M48.02 NEUROFORAMINAL STENOSIS OF CERVICAL SPINE: ICD-10-CM

## 2021-03-30 PROCEDURE — 72052 X-RAY EXAM NECK SPINE 6/>VWS: CPT

## 2021-03-30 PROCEDURE — 93005 ELECTROCARDIOGRAM TRACING: CPT

## 2021-03-30 PROCEDURE — 99283 EMERGENCY DEPT VISIT LOW MDM: CPT

## 2021-03-30 RX ORDER — METHYLPREDNISOLONE 4 MG/1
TABLET ORAL
Qty: 1 DOSE PACK | Refills: 0 | Status: SHIPPED | OUTPATIENT
Start: 2021-03-30

## 2021-03-30 RX ORDER — METHOCARBAMOL 750 MG/1
750 TABLET, FILM COATED ORAL 4 TIMES DAILY
Qty: 20 TAB | Refills: 0 | Status: SHIPPED | OUTPATIENT
Start: 2021-03-30 | End: 2021-04-04

## 2021-03-30 NOTE — ED PROVIDER NOTES
EMERGENCY DEPARTMENT HISTORY AND PHYSICAL EXAM    Date: 3/30/2021  Patient Name: Lyly Lagos    History of Presenting Illness     Chief Complaint   Patient presents with    Shoulder Pain         History Provided By: Patient    Chief Complaint: neck/shoulder pain    HPI(Context):   2:32 PM  Lyly Lagos is a 54 y.o. female with PMHX of HTN, SVT, WPW, GERD, OA who presents to the emergency department C/O left sided neck pain. Associated sxs include radiation to left shoulder. Pain is worse with movement. No relief with OTC meds. Sxs x one week. Pain is moderate and throbbing in nature. Pt reports intermittent tingling in left fingertips. Pt has hx of same in past that resolved with medication. No trauma or overuse. Pt denies numbness, weakness, chest pain, SOB, and any other sxs or complaints. PCP: Antonia Sterling MD    Current Outpatient Medications   Medication Sig Dispense Refill    methocarbamoL (Robaxin-750) 750 mg tablet Take 1 Tab by mouth four (4) times daily for 5 days. Indications: muscle spasm 20 Tab 0    methylPREDNISolone (Medrol, Naveen,) 4 mg tablet Take with food. 1 Dose Pack 0    albuterol (PROVENTIL HFA, VENTOLIN HFA, PROAIR HFA) 90 mcg/actuation inhaler Take 1 Puff by inhalation every four (4) hours as needed for Wheezing. 1 Inhaler 0    lidocaine (Lidoderm) 5 % Apply patch to the affected area for 12 hours a day and remove for 12 hours a day. 5 Each 0    promethazine (PHENERGAN) 25 mg tablet Take 1 Tab by mouth every six (6) hours as needed. As needed for nausea or vomiting 12 Tab 0    lisinopril (PRINIVIL, ZESTRIL) 20 mg tablet Take 20 mg by mouth daily.  verapamil SR (CALAN-SR) 180 mg CR tablet Take 180 mg by mouth nightly.          Past History     Past Medical History:  Past Medical History:   Diagnosis Date    Arthritis     GERD (gastroesophageal reflux disease)     Hepatitis C     Hypertension     SVT (supraventricular tachycardia) (HCC)     WPW (Nahomy-Parkinson-White syndrome)        Past Surgical History:  Past Surgical History:   Procedure Laterality Date    HX KNEE ARTHROSCOPY Right     HX OTHER SURGICAL  1997    Hx knee surgery R knee    HX SVT ABLATION      HX TOTAL COLECTOMY         Family History:  History reviewed. No pertinent family history. Social History:  Social History     Tobacco Use    Smoking status: Former Smoker     Packs/day: 0.25     Years: 17.00     Pack years: 4.25     Types: Cigarettes    Smokeless tobacco: Never Used   Substance Use Topics    Alcohol use: Yes     Alcohol/week: 2.0 standard drinks     Types: 2 Glasses of wine per week     Comment: socially    Drug use: Yes     Types: Marijuana     Comment: 1-2 monthly        Allergies:  No Known Allergies      Review of Systems   Review of Systems   Musculoskeletal: Positive for arthralgias and neck pain. Skin: Negative for color change. Neurological: Negative for weakness, numbness and headaches. All other systems reviewed and are negative. Physical Exam     Vitals:    03/30/21 1426   BP: (!) 189/95   Pulse: 63   Resp: 20   Temp: 98.2 °F (36.8 °C)   SpO2: 100%   Weight: 99.8 kg (220 lb)   Height: 5' 5\" (1.651 m)     Physical Exam  Vitals signs and nursing note reviewed. Constitutional:       General: She is not in acute distress. Appearance: Normal appearance. Comments: AA female in NAD. Alert. In fast track room. HENT:      Head: Normocephalic and atraumatic. Right Ear: External ear normal.      Left Ear: External ear normal.      Nose: Nose normal.   Eyes:      Conjunctiva/sclera: Conjunctivae normal.   Neck:      Musculoskeletal: Decreased range of motion. Pain with movement and muscular tenderness present. No spinous process tenderness. Cardiovascular:      Rate and Rhythm: Normal rate and regular rhythm. Pulses: Normal pulses. Heart sounds: Normal heart sounds. No murmur. No friction rub. No gallop.     Pulmonary:      Effort: Pulmonary effort is normal. No respiratory distress. Breath sounds: Normal breath sounds. No wheezing. Musculoskeletal:      Cervical back: She exhibits tenderness (left sided cervical paraspinal muscle tenderness) and pain. Skin:     General: Skin is warm. Capillary Refill: Capillary refill takes less than 2 seconds. Neurological:      Mental Status: She is alert and oriented to person, place, and time. Mental status is at baseline. GCS: GCS eye subscore is 4. GCS verbal subscore is 5. GCS motor subscore is 6. Sensory: Sensation is intact. Psychiatric:         Behavior: Behavior normal.         Judgment: Judgment normal.             Diagnostic Study Results     Labs -     Recent Results (from the past 12 hour(s))   EKG, 12 LEAD, INITIAL    Collection Time: 03/30/21  2:39 PM   Result Value Ref Range    Ventricular Rate 64 BPM    Atrial Rate 64 BPM    P-R Interval 148 ms    QRS Duration 96 ms    Q-T Interval 418 ms    QTC Calculation (Bezet) 431 ms    Calculated P Axis 54 degrees    Calculated R Axis 17 degrees    Calculated T Axis 30 degrees    Diagnosis       Normal sinus rhythm  Possible Left atrial enlargement  Possible Anterior infarct (cited on or before 06-MAR-2021)  Abnormal ECG  When compared with ECG of 06-MAR-2021 12:03,  Vent. rate has decreased BY  31 BPM  Non-specific change in ST segment in Lateral leads  QT has shortened           XR SPINE CERV MIN 6 VWS   Final Result      Multilevel degenerative changes including osseous neuroforaminal encroachment,   but negative for fracture or traumatic subluxation in the cervical spine. CT Results  (Last 48 hours)    None        CXR Results  (Last 48 hours)    None          Medications given in the ED-  Medications - No data to display      Medical Decision Making   I am the first provider for this patient.     I reviewed the vital signs, available nursing notes, past medical history, past surgical history, family history and social history. Vital Signs-Reviewed the patient's vital signs. Pulse Oximetry Analysis - 100% on RA. NORMAL      Records Reviewed: Nursing Notes and Old Medical Records    Provider Notes (Medical Decision Making): cervical strain, HNP, OA, spinal stenosis, cervical radiculopathy, torticollis. Procedures:  Procedures    ED Course:   2:32 PM Initial assessment performed. The patients presenting problems have been discussed, and they are in agreement with the care plan formulated and outlined with them. I have encouraged them to ask questions as they arise throughout their visit. Diagnosis and Disposition       Suspect cervical radiculopathy given imaging and sxs. No weakness or numbness. Denies CP or SOB. Doubt referred cardiac. Will tx sxs. FU with Ortho or PCP. Reasons to RTED discussed with pt. All questions answered. Pt feels comfortable going home at this time. Pt expressed understanding and she agrees with plan. 1. Cervical spondylolysis    2. Neuroforaminal stenosis of cervical spine        PLAN:  1. D/C Home  2. Discharge Medication List as of 3/30/2021  4:10 PM      START taking these medications    Details   methocarbamoL (Robaxin-750) 750 mg tablet Take 1 Tab by mouth four (4) times daily for 5 days. Indications: muscle spasm, Normal, Disp-20 Tab, R-0      methylPREDNISolone (Medrol, Naveen,) 4 mg tablet Take with food., Normal, Disp-1 Dose Pack, R-0         CONTINUE these medications which have NOT CHANGED    Details   albuterol (PROVENTIL HFA, VENTOLIN HFA, PROAIR HFA) 90 mcg/actuation inhaler Take 1 Puff by inhalation every four (4) hours as needed for Wheezing., Normal, Disp-1 Inhaler, R-0      lidocaine (Lidoderm) 5 % Apply patch to the affected area for 12 hours a day and remove for 12 hours a day., Normal, Disp-5 Each,R-0      promethazine (PHENERGAN) 25 mg tablet Take 1 Tab by mouth every six (6) hours as needed.  As needed for nausea or vomiting, Print, Disp-12 Tab, R-0      lisinopril (PRINIVIL, ZESTRIL) 20 mg tablet Take 20 mg by mouth daily. , Historical Med      verapamil SR (CALAN-SR) 180 mg CR tablet Take 180 mg by mouth nightly., Historical Med         STOP taking these medications       predniSONE (DELTASONE) 20 mg tablet Comments:   Reason for Stopping:             3.   Follow-up Information     Follow up With Specialties Details Why Contact Info    Ольга Laird MD Orthopedic Surgery  follow up with orthopedist Stan [phone]      Gregg Armstrong MD Family Medicine   [address] [id number]  [phone]      [facility] EMERGENCY DEPT Emergency Medicine   [address] [facility]  [phone]        _______________________________    Attestations: This note is prepared by Namrata Palomares PA-C.  _______________________________      Please note that this dictation was completed with [employer], the computer voice recognition software. Quite often unanticipated grammatical, syntax, homophones, and other interpretive errors are inadvertently transcribed by the computer software. Please disregard these errors. Please excuse any errors that have escaped final proofreading.

## 2021-04-04 LAB
ATRIAL RATE: 64 BPM
CALCULATED P AXIS, ECG09: 54 DEGREES
CALCULATED R AXIS, ECG10: 17 DEGREES
CALCULATED T AXIS, ECG11: 30 DEGREES
DIAGNOSIS, 93000: NORMAL
P-R INTERVAL, ECG05: 148 MS
Q-T INTERVAL, ECG07: 418 MS
QRS DURATION, ECG06: 96 MS
QTC CALCULATION (BEZET), ECG08: 431 MS
VENTRICULAR RATE, ECG03: 64 BPM

## 2021-09-04 NOTE — ED NOTES
Pt reports HA has not improved, chest pain has improved s/p pain medication. Will make MD aware. non-distended

## 2021-09-08 ENCOUNTER — HOSPITAL ENCOUNTER (EMERGENCY)
Age: 56
Discharge: HOME | End: 2021-09-08
Attending: EMERGENCY MEDICINE
Payer: MEDICARE

## 2021-09-08 VITALS
HEIGHT: 65 IN | WEIGHT: 217 LBS | RESPIRATION RATE: 16 BRPM | TEMPERATURE: 97.8 F | OXYGEN SATURATION: 98 % | BODY MASS INDEX: 36.15 KG/M2 | HEART RATE: 85 BPM

## 2021-09-08 DIAGNOSIS — Z20.822 PERSON UNDER INVESTIGATION FOR COVID-19: Primary | ICD-10-CM

## 2021-09-08 LAB — SARS-COV-2, COV2: NORMAL

## 2021-09-08 PROCEDURE — U0005 INFEC AGEN DETEC AMPLI PROBE: HCPCS

## 2021-09-08 PROCEDURE — 99282 EMERGENCY DEPT VISIT SF MDM: CPT

## 2021-09-08 PROCEDURE — U0003 INFECTIOUS AGENT DETECTION BY NUCLEIC ACID (DNA OR RNA); SEVERE ACUTE RESPIRATORY SYNDROME CORONAVIRUS 2 (SARS-COV-2) (CORONAVIRUS DISEASE [COVID-19]), AMPLIFIED PROBE TECHNIQUE, MAKING USE OF HIGH THROUGHPUT TECHNOLOGIES AS DESCRIBED BY CMS-2020-01-R: HCPCS

## 2021-09-08 NOTE — Clinical Note
[facility]  [facility] EMER[facility]ENCY [facility]PT  [address] [facility] [address] [phone]    Work/School Note    Date: 9/8/2021     To Whom It May concern:    Emma Ogden was evaulated by the following provider(s):  Attending Provider: Romayne Carbine, MD  Physician Assistant: Michael Kamara, [address] virus is suspected. Per the CDC guidelines we recommend home isolation until the following conditions are all met:    1. At least 10 days have passed since symptoms first appeared and  2. At least 24 hours have passed since last fever without the use of fever-reducing medications and  3.  Symptoms (e.g., cough, shortness of breath) have improved    Sincerely,          KENYETTA Coley

## 2021-09-08 NOTE — ED PROVIDER NOTES
EMERGENCY DEPARTMENT HISTORY AND PHYSICAL EXAM    Date: 9/8/2021  Patient Name: Emma gOden    History of Presenting Illness     Chief Complaint   Patient presents with    Covid Testing         History Provided By: Patient    Chief Complaint: covid test       Additional History (Context):   3:12 PM  Emma Ogden is a 64 y.o. female with PMHX hypertension WPW SVT hepatitis C presents to the emergency department C/O Covid test.  Patient states she is fully vaccinated but was exposed to her granddaughter on Sunday who is positive. Would like to be tested. Complaining of slight cough and slight headache. PCP: Chris Garcia MD    Current Outpatient Medications   Medication Sig Dispense Refill    methylPREDNISolone (Medrol, Naveen,) 4 mg tablet Take with food. 1 Dose Pack 0    albuterol (PROVENTIL HFA, VENTOLIN HFA, PROAIR HFA) 90 mcg/actuation inhaler Take 1 Puff by inhalation every four (4) hours as needed for Wheezing. 1 Inhaler 0    lidocaine (Lidoderm) 5 % Apply patch to the affected area for 12 hours a day and remove for 12 hours a day. 5 Each 0    promethazine (PHENERGAN) 25 mg tablet Take 1 Tab by mouth every six (6) hours as needed. As needed for nausea or vomiting 12 Tab 0    lisinopril (PRINIVIL, ZESTRIL) 20 mg tablet Take 20 mg by mouth daily.  verapamil SR (CALAN-SR) 180 mg CR tablet Take 180 mg by mouth nightly. Past History     Past Medical History:  Past Medical History:   Diagnosis Date    Arthritis     GERD (gastroesophageal reflux disease)     Hepatitis C     Hypertension     SVT (supraventricular tachycardia) (HCC)     WPW (Nahomy-Parkinson-White syndrome)        Past Surgical History:  Past Surgical History:   Procedure Laterality Date    HX KNEE ARTHROSCOPY Right     HX OTHER SURGICAL  1997    Hx knee surgery R knee    HX SVT ABLATION      HX TOTAL COLECTOMY         Family History:  History reviewed. No pertinent family history.     Social History:  Social History Tobacco Use    Smoking status: Former Smoker     Packs/day: 0.25     Years: 17.00     Pack years: 4.25     Types: Cigarettes    Smokeless tobacco: Never Used   Substance Use Topics    Alcohol use: Yes     Alcohol/week: 2.0 standard drinks     Types: 2 Glasses of wine per week     Comment: socially    Drug use: Yes     Types: Marijuana     Comment: 1-2 monthly        Allergies:  No Known Allergies    Review of Systems   Review of Systems   Constitutional: Negative for chills and fever. Respiratory: Positive for cough. Negative for shortness of breath. Cardiovascular: Negative for chest pain. Gastrointestinal: Negative for abdominal pain, diarrhea, nausea and vomiting. Neurological: Positive for headaches. All other systems reviewed and are negative. Physical Exam     Vitals:    09/08/21 1426   Pulse: 85   Resp: 16   Temp: 97.8 °F (36.6 °C)   SpO2: 98%   Weight: 98.4 kg (217 lb)   Height: 5' 5\" (1.651 m)     Physical Exam  Vitals and nursing note reviewed. Constitutional:       Appearance: She is well-developed. HENT:      Head: Normocephalic and atraumatic. Cardiovascular:      Rate and Rhythm: Normal rate and regular rhythm. Heart sounds: Normal heart sounds. No murmur heard. Pulmonary:      Effort: Pulmonary effort is normal. No respiratory distress. Musculoskeletal:      Cervical back: Normal range of motion and neck supple. Neurological:      Mental Status: She is alert and oriented to person, place, and time. Psychiatric:         Judgment: Judgment normal.         Diagnostic Study Results     Labs:   No results found for this or any previous visit (from the past 12 hour(s)). Radiologic Studies:   No orders to display     CT Results  (Last 48 hours)    None        CXR Results  (Last 48 hours)    None          Medical Decision Making   I am the first provider for this patient.     I reviewed the vital signs, available nursing notes, past medical history, past surgical history, family history and social history. Vital Signs: Reviewed the patient's vital signs. Pulse Oximetry Analysis: 98% on RA       Records Reviewed: Nursing Notes and Old Medical Records    Procedures:  Procedures    ED Course:   3:12 PM Initial assessment performed. The patients presenting problems have been discussed, and they are in agreement with the care plan formulated and outlined with them. I have encouraged them to ask questions as they arise throughout their visit. Discussion:  Pt presents for Covid test.  Patient is fully vaccinated but was recently exposed to her granddaughter who has it. Will test and advised to self isolate until test results. . Strict return precautions given, pt offering no questions or complaints. Diagnosis and Disposition     DISCHARGE NOTE:  Clary Ratliff's  results have been reviewed with her. She has been counseled regarding her diagnosis, treatment, and plan. She verbally conveys understanding and agreement of the signs, symptoms, diagnosis, treatment and prognosis and additionally agrees to follow up as discussed. She also agrees with the care-plan and conveys that all of her questions have been answered. I have also provided discharge instructions for her that include: educational information regarding their diagnosis and treatment, and list of reasons why they would want to return to the ED prior to their follow-up appointment, should her condition change. She has been provided with education for proper emergency department utilization. CLINICAL IMPRESSION:    1. Person under investigation for COVID-19        PLAN:  1. D/C Home  2. Current Discharge Medication List        3.    Follow-up Information     Follow up With Specialties Details Why Contact Info    Maricarmen Quiroz MD Family Medicine Schedule an appointment as soon as possible for a visit   [address] [facility] [address] [id number]  [phone]      [facility] EMERGENCY DEPT Emergency Medicine  If symptoms worsen [address] [place] [id number] [phone]                 Please note that this dictation was completed with [employer], the computer voice recognition software. Quite often unanticipated grammatical, syntax, homophones, and other interpretive errors are inadvertently transcribed by the computer software. Please disregard these errors. Please excuse any errors that have escaped final proofreading.

## 2021-09-09 ENCOUNTER — PATIENT OUTREACH (OUTPATIENT)
Dept: CASE MANAGEMENT | Age: 56
End: 2021-09-09

## 2021-09-09 NOTE — PROGRESS NOTES
Patient contacted regarding COVID-19 exposure. Discussed COVID-19 related testing which was pending at this time. Test results were pending. Patient informed of results, if available? Still pending at this time. Ambulatory Care Manager contacted the patient by telephone to perform post discharge assessment. Call within 2 business days of discharge: Yes Verified name and  with patient as identifiers. Provided introduction to self, and explanation of the CTN/ACM role, and reason for call due to risk factors for infection and/or exposure to COVID-19. Symptoms reviewed with patient who verbalized the following symptoms: cough, no new symptoms and no worsening symptoms      Due to no new or worsening symptoms encounter was not routed to provider for escalation. Discussed follow-up appointments. If no appointment was previously scheduled, appointment scheduling offered:  no. [facility] follow up appointment(s): No future appointments. [facility] follow up appointment(s): pt to f/u with PCP once gets results of Covid test    Interventions to address risk factors: Scheduled appointment with PCP-once tests results are available     Advance Care Planning:   Does patient have an Advance Directive: not on file. Educated patient about risk for severe COVID-19 due to risk factors according to CDC guidelines. ACM reviewed discharge instructions, medical action plan and red flag symptoms with the patient who verbalized understanding. Discussed COVID vaccination status: yes. Patient is fully vaccinated. Discussed exposure protocols and quarantine with CDC Guidelines. Patient was given an opportunity to verbalize any questions and concerns and agrees to contact ACM or health care provider for questions related to their healthcare. Reviewed and educated patient on any new and changed medications related to discharge diagnosis  - no new medications    Was patient discharged with a pulse oximeter?  no      ACM provided contact information. Plan for follow-up call in 1-2 days - once test results are available.

## 2021-09-12 LAB — SARS-COV-2, COV2NT: NOT DETECTED

## 2021-09-14 ENCOUNTER — PATIENT OUTREACH (OUTPATIENT)
Dept: CASE MANAGEMENT | Age: 56
End: 2021-09-14

## 2021-09-14 NOTE — PROGRESS NOTES
Patient resolved from [address] Transitions episode on 9/14/21. Discussed COVID-19 related testing which was available at this time. Test results were negative. Patient informed of results, if available? yes     Patient/family has been provided the following resources and education related to COVID-19:                         Signs, symptoms and red flags related to COVID-19            [facility] exposure and quarantine guidelines            Conduit exposure contact - [phone]            Contact for their local Department of Health                 Patient currently reports that the following symptoms have improved:  cough. - patient states she feels fine. Patient is already fully vaccinated - will wait to hear further information regarding possible vaccine booster. No further outreach scheduled with this CTN/ACM/LPN/HC/ MA. Episode of Care resolved. Patient has this CTN/ACM/LPN/HC/MA contact information if future needs arise.

## 2021-09-16 ENCOUNTER — HOSPITAL ENCOUNTER (EMERGENCY)
Age: 56
Discharge: HOME | End: 2021-09-16
Attending: EMERGENCY MEDICINE
Payer: MEDICARE

## 2021-09-16 VITALS
DIASTOLIC BLOOD PRESSURE: 68 MMHG | TEMPERATURE: 98.2 F | SYSTOLIC BLOOD PRESSURE: 150 MMHG | HEART RATE: 71 BPM | RESPIRATION RATE: 30 BRPM | OXYGEN SATURATION: 97 %

## 2021-09-16 DIAGNOSIS — J44.1 COPD EXACERBATION (HCC): Primary | ICD-10-CM

## 2021-09-16 LAB
ALBUMIN SERPL-MCNC: 3.6 G/DL (ref 3.4–5)
ALBUMIN/GLOB SERPL: 0.8 {RATIO} (ref 0.8–1.7)
ALP SERPL-CCNC: 81 U/L (ref 45–117)
ALT SERPL-CCNC: 24 U/L (ref 13–56)
ANION GAP SERPL CALC-SCNC: 5 MMOL/L (ref 3–18)
AST SERPL-CCNC: 34 U/L (ref 10–38)
ATRIAL RATE: 53 BPM
BASOPHILS # BLD: 0 K/UL (ref 0–0.1)
BASOPHILS NFR BLD: 0 % (ref 0–2)
BILIRUB SERPL-MCNC: 0.5 MG/DL (ref 0.2–1)
BUN SERPL-MCNC: 18 MG/DL (ref 7–18)
BUN/CREAT SERPL: 18 (ref 12–20)
CALCIUM SERPL-MCNC: 9.7 MG/DL (ref 8.5–10.1)
CALCULATED P AXIS, ECG09: 28 DEGREES
CALCULATED R AXIS, ECG10: 23 DEGREES
CALCULATED T AXIS, ECG11: 38 DEGREES
CHLORIDE SERPL-SCNC: 106 MMOL/L (ref 100–111)
CK MB CFR SERPL CALC: NORMAL % (ref 0–4)
CK MB SERPL-MCNC: <1 NG/ML (ref 5–25)
CK SERPL-CCNC: 187 U/L (ref 26–192)
CO2 SERPL-SCNC: 29 MMOL/L (ref 21–32)
CREAT SERPL-MCNC: 1.01 MG/DL (ref 0.6–1.3)
DIAGNOSIS, 93000: NORMAL
DIFFERENTIAL METHOD BLD: ABNORMAL
EOSINOPHIL # BLD: 0.3 K/UL (ref 0–0.4)
EOSINOPHIL NFR BLD: 4 % (ref 0–5)
ERYTHROCYTE [DISTWIDTH] IN BLOOD BY AUTOMATED COUNT: 14.6 % (ref 11.6–14.5)
GLOBULIN SER CALC-MCNC: 4.6 G/DL (ref 2–4)
GLUCOSE SERPL-MCNC: 101 MG/DL (ref 74–99)
HCT VFR BLD AUTO: 39.8 % (ref 35–45)
HGB BLD-MCNC: 13.1 G/DL (ref 12–16)
LYMPHOCYTES # BLD: 2.1 K/UL (ref 0.9–3.6)
LYMPHOCYTES NFR BLD: 30 % (ref 21–52)
MCH RBC QN AUTO: 27 PG (ref 24–34)
MCHC RBC AUTO-ENTMCNC: 32.9 G/DL (ref 31–37)
MCV RBC AUTO: 81.9 FL (ref 78–100)
MONOCYTES # BLD: 0.4 K/UL (ref 0.05–1.2)
MONOCYTES NFR BLD: 6 % (ref 3–10)
NEUTS SEG # BLD: 4.2 K/UL (ref 1.8–8)
NEUTS SEG NFR BLD: 60 % (ref 40–73)
P-R INTERVAL, ECG05: 156 MS
PLATELET # BLD AUTO: 117 K/UL (ref 135–420)
PMV BLD AUTO: 11.1 FL (ref 9.2–11.8)
POTASSIUM SERPL-SCNC: 4 MMOL/L (ref 3.5–5.5)
PROT SERPL-MCNC: 8.2 G/DL (ref 6.4–8.2)
Q-T INTERVAL, ECG07: 454 MS
QRS DURATION, ECG06: 88 MS
QTC CALCULATION (BEZET), ECG08: 426 MS
RBC # BLD AUTO: 4.86 M/UL (ref 4.2–5.3)
SODIUM SERPL-SCNC: 140 MMOL/L (ref 136–145)
TROPONIN I SERPL-MCNC: <0.02 NG/ML (ref 0–0.04)
VENTRICULAR RATE, ECG03: 53 BPM
WBC # BLD AUTO: 7 K/UL (ref 4.6–13.2)

## 2021-09-16 PROCEDURE — 80053 COMPREHEN METABOLIC PANEL: CPT

## 2021-09-16 PROCEDURE — 99285 EMERGENCY DEPT VISIT HI MDM: CPT

## 2021-09-16 PROCEDURE — 74011000250 HC RX REV CODE- 250: Performed by: EMERGENCY MEDICINE

## 2021-09-16 PROCEDURE — 74011250636 HC RX REV CODE- 250/636: Performed by: EMERGENCY MEDICINE

## 2021-09-16 PROCEDURE — 82553 CREATINE MB FRACTION: CPT

## 2021-09-16 PROCEDURE — 94640 AIRWAY INHALATION TREATMENT: CPT

## 2021-09-16 PROCEDURE — 93005 ELECTROCARDIOGRAM TRACING: CPT

## 2021-09-16 PROCEDURE — 85025 COMPLETE CBC W/AUTO DIFF WBC: CPT

## 2021-09-16 PROCEDURE — 96374 THER/PROPH/DIAG INJ IV PUSH: CPT

## 2021-09-16 RX ORDER — ALBUTEROL SULFATE 0.83 MG/ML
2.5 SOLUTION RESPIRATORY (INHALATION)
Status: COMPLETED | OUTPATIENT
Start: 2021-09-16 | End: 2021-09-16

## 2021-09-16 RX ORDER — PREDNISONE 20 MG/1
60 TABLET ORAL DAILY
Qty: 15 TABLET | Refills: 0 | Status: SHIPPED | OUTPATIENT
Start: 2021-09-16 | End: 2021-09-21

## 2021-09-16 RX ORDER — IPRATROPIUM BROMIDE AND ALBUTEROL SULFATE 2.5; .5 MG/3ML; MG/3ML
3 SOLUTION RESPIRATORY (INHALATION)
Status: COMPLETED | OUTPATIENT
Start: 2021-09-16 | End: 2021-09-16

## 2021-09-16 RX ADMIN — IPRATROPIUM BROMIDE AND ALBUTEROL SULFATE 3 ML: .5; 3 SOLUTION RESPIRATORY (INHALATION) at 14:10

## 2021-09-16 RX ADMIN — METHYLPREDNISOLONE SODIUM SUCCINATE 125 MG: 125 INJECTION, POWDER, FOR SOLUTION INTRAMUSCULAR; INTRAVENOUS at 14:10

## 2021-09-16 RX ADMIN — ALBUTEROL SULFATE 2.5 MG: 2.5 SOLUTION RESPIRATORY (INHALATION) at 15:12

## 2021-09-16 NOTE — ED PROVIDER NOTES
68-year-old female presents to the emergency department with complaint of wheezing and shortness of breath. Has a past medical history of hypertension hyperlipidemia COPD and with cirrhosis of the liver. Treated at home for COPD with albuterol, but states her inhaler was not working she came to the ER. To the ED in no acute distress speaking in full sentences. No hypotension or tachycardia. Mildly hypertension on arrival.           Past Medical History:   Diagnosis Date    Arthritis     GERD (gastroesophageal reflux disease)     Hepatitis C     Hypertension     SVT (supraventricular tachycardia) (HCC)     WPW (Nahomy-Parkinson-White syndrome)        Past Surgical History:   Procedure Laterality Date    HX KNEE ARTHROSCOPY Right     HX OTHER SURGICAL  1997    Hx knee surgery R knee    HX SVT ABLATION      HX TOTAL COLECTOMY           History reviewed. No pertinent family history. Social History     Socioeconomic History    Marital status:      Spouse name: Not on file    Number of children: Not on file    Years of education: Not on file    Highest education level: Not on file   Occupational History    Not on file   Tobacco Use    Smoking status: Former Smoker     Packs/day: 0.25     Years: 17.00     Pack years: 4.25     Types: Cigarettes    Smokeless tobacco: Never Used   Substance and Sexual Activity    Alcohol use:  Yes     Alcohol/week: 2.0 standard drinks     Types: 2 Glasses of wine per week     Comment: socially    Drug use: Not Currently     Types: Marijuana     Comment: 1-2 monthly     Sexual activity: Not on file   Other Topics Concern    Not on file   Social History Narrative    Not on file     Social Determinants of Health     Financial Resource Strain:     Difficulty of Paying Living Expenses:    Food Insecurity:     Worried About Running Out of Food in the Last Year:     Sam of Food in the Last Year:    Transportation Needs:     Lack of Transportation (Medical):  Lack of Transportation (Non-Medical):    Physical Activity:     Days of Exercise per Week:     Minutes of Exercise per Session:    Stress:     Feeling of Stress :    Social Connections:     Frequency of Communication with Friends and Family:     Frequency of Social Gatherings with Friends and Family:     Attends Worship Services:     Active Member of Clubs or Organizations:     Attends Club or Organization Meetings:     Marital Status:    Intimate Partner Violence:     Fear of Current or Ex-Partner:     Emotionally Abused:     Physically Abused:     Sexually Abused: ALLERGIES: Patient has no known allergies. Review of Systems   Constitutional: Negative. HENT: Negative. Eyes: Negative. Respiratory: Positive for chest tightness, shortness of breath and wheezing. Negative for choking and stridor. Cardiovascular: Negative. Gastrointestinal: Negative. Endocrine: Negative. Genitourinary: Negative. Musculoskeletal: Negative. Neurological: Negative. Hematological: Negative. Vitals:    09/16/21 1345 09/16/21 1430 09/16/21 1500 09/16/21 1600   BP: (!) 103/56 132/89 127/81 (!) 156/73   Pulse: (!) 52 (!) 50 (!) 58 74   Resp: 11 20 21 17   Temp:       SpO2: 99% 100% 97% 97%            Physical Exam  Constitutional:       General: She is not in acute distress. Appearance: Normal appearance. She is not ill-appearing, toxic-appearing or diaphoretic. HENT:      Head: Normocephalic and atraumatic. Nose: Nose normal. No congestion or rhinorrhea. Mouth/Throat:      Mouth: Mucous membranes are moist.   Eyes:      Extraocular Movements: Extraocular movements intact. Pupils: Pupils are equal, round, and reactive to light. Cardiovascular:      Rate and Rhythm: Normal rate and regular rhythm. Pulses: Normal pulses. Heart sounds: Normal heart sounds. No murmur heard. Pulmonary:      Effort: No respiratory distress.       Breath sounds: No stridor. Wheezing present. No rhonchi or rales. Chest:      Chest wall: No tenderness. Abdominal:      General: Abdomen is flat. There is no distension. Palpations: There is no mass. Tenderness: There is no abdominal tenderness. Hernia: No hernia is present. Musculoskeletal:         General: No swelling or tenderness. Normal range of motion. Cervical back: Normal range of motion and neck supple. Skin:     General: Skin is warm and dry. Capillary Refill: Capillary refill takes less than 2 seconds. Neurological:      General: No focal deficit present. Mental Status: She is alert and oriented to person, place, and time. Mental status is at baseline. Psychiatric:         Mood and Affect: Mood normal.         Behavior: Behavior normal.          MDM  Number of Diagnoses or Management Options  COPD exacerbation ([facility] [address]  Diagnosis management comments: 59-year-old female presents emergency department with complaint of acute shortness of breath and wheezing. Patient is known to have COPD, still smokes. Patient states she used her inhaler at home without any relief. AFebrile on arrival.  Not tachypneic not hypoxic pulse is 74. On exam patient had mild wheezing in bilateral lung bases , no acute distress. Abdomen soft nontender nondistended. In the ED patient was given a DuoNeb and IV steroids. Patient feeling better but still having mild wheezing after 1 dose of nebulized albuterol treatment, given second dose of nebulized albuterol. After second dose of nebulized albuterol patient feeling much better and wheezing cannot be detected anymore on auscultation.   Discharged home with 5-day course of steroids and PMD follow-up         Procedures

## 2022-03-17 ENCOUNTER — HOSPITAL ENCOUNTER (EMERGENCY)
Age: 57
Discharge: HOME | End: 2022-03-17
Attending: EMERGENCY MEDICINE
Payer: MEDICARE

## 2022-03-17 VITALS
OXYGEN SATURATION: 95 % | SYSTOLIC BLOOD PRESSURE: 125 MMHG | BODY MASS INDEX: 35.49 KG/M2 | HEART RATE: 56 BPM | WEIGHT: 213 LBS | RESPIRATION RATE: 25 BRPM | DIASTOLIC BLOOD PRESSURE: 78 MMHG | HEIGHT: 65 IN | TEMPERATURE: 97.7 F

## 2022-03-17 DIAGNOSIS — M54.32 SCIATICA OF LEFT SIDE: Primary | ICD-10-CM

## 2022-03-17 LAB
ALBUMIN SERPL-MCNC: 3.5 G/DL (ref 3.4–5)
ALBUMIN/GLOB SERPL: 0.8 {RATIO} (ref 0.8–1.7)
ALP SERPL-CCNC: 93 U/L (ref 45–117)
ALT SERPL-CCNC: 26 U/L (ref 13–56)
ANION GAP SERPL CALC-SCNC: 6 MMOL/L (ref 3–18)
APPEARANCE UR: CLEAR
AST SERPL-CCNC: 33 U/L (ref 10–38)
ATRIAL RATE: 66 BPM
BACTERIA URNS QL MICRO: ABNORMAL /HPF
BASOPHILS # BLD: 0 K/UL (ref 0–0.1)
BASOPHILS NFR BLD: 1 % (ref 0–2)
BILIRUB SERPL-MCNC: 0.3 MG/DL (ref 0.2–1)
BILIRUB UR QL: NEGATIVE
BNP SERPL-MCNC: 181 PG/ML (ref 0–900)
BUN SERPL-MCNC: 23 MG/DL (ref 7–18)
BUN/CREAT SERPL: 20 (ref 12–20)
CALCIUM SERPL-MCNC: 9.2 MG/DL (ref 8.5–10.1)
CALCULATED P AXIS, ECG09: 54 DEGREES
CALCULATED R AXIS, ECG10: 25 DEGREES
CALCULATED T AXIS, ECG11: 37 DEGREES
CHLORIDE SERPL-SCNC: 110 MMOL/L (ref 100–111)
CO2 SERPL-SCNC: 27 MMOL/L (ref 21–32)
COLOR UR: YELLOW
CREAT SERPL-MCNC: 1.13 MG/DL (ref 0.6–1.3)
DIAGNOSIS, 93000: NORMAL
DIFFERENTIAL METHOD BLD: ABNORMAL
EOSINOPHIL # BLD: 0.3 K/UL (ref 0–0.4)
EOSINOPHIL NFR BLD: 5 % (ref 0–5)
EPITH CASTS URNS QL MICRO: ABNORMAL /LPF (ref 0–5)
ERYTHROCYTE [DISTWIDTH] IN BLOOD BY AUTOMATED COUNT: 15 % (ref 11.6–14.5)
GLOBULIN SER CALC-MCNC: 4.3 G/DL (ref 2–4)
GLUCOSE SERPL-MCNC: 104 MG/DL (ref 74–99)
GLUCOSE UR STRIP.AUTO-MCNC: NEGATIVE MG/DL
HCT VFR BLD AUTO: 36.8 % (ref 35–45)
HGB BLD-MCNC: 11.6 G/DL (ref 12–16)
HGB UR QL STRIP: NEGATIVE
IMM GRANULOCYTES # BLD AUTO: 0 K/UL (ref 0–0.04)
IMM GRANULOCYTES NFR BLD AUTO: 0 % (ref 0–0.5)
KETONES UR QL STRIP.AUTO: NEGATIVE MG/DL
LEUKOCYTE ESTERASE UR QL STRIP.AUTO: ABNORMAL
LYMPHOCYTES # BLD: 2.2 K/UL (ref 0.9–3.6)
LYMPHOCYTES NFR BLD: 35 % (ref 21–52)
MCH RBC QN AUTO: 25.9 PG (ref 24–34)
MCHC RBC AUTO-ENTMCNC: 31.5 G/DL (ref 31–37)
MCV RBC AUTO: 82.1 FL (ref 78–100)
MONOCYTES # BLD: 0.5 K/UL (ref 0.05–1.2)
MONOCYTES NFR BLD: 8 % (ref 3–10)
NEUTS SEG # BLD: 3.2 K/UL (ref 1.8–8)
NEUTS SEG NFR BLD: 51 % (ref 40–73)
NITRITE UR QL STRIP.AUTO: NEGATIVE
NRBC # BLD: 0 K/UL (ref 0–0.01)
NRBC BLD-RTO: 0 PER 100 WBC
P-R INTERVAL, ECG05: 150 MS
PH UR STRIP: 5.5 [PH] (ref 5–8)
PLATELET # BLD AUTO: 109 K/UL (ref 135–420)
PMV BLD AUTO: 11 FL (ref 9.2–11.8)
POTASSIUM SERPL-SCNC: 3.8 MMOL/L (ref 3.5–5.5)
PROT SERPL-MCNC: 7.8 G/DL (ref 6.4–8.2)
PROT UR STRIP-MCNC: NEGATIVE MG/DL
Q-T INTERVAL, ECG07: 442 MS
QRS DURATION, ECG06: 80 MS
QTC CALCULATION (BEZET), ECG08: 463 MS
RBC # BLD AUTO: 4.48 M/UL (ref 4.2–5.3)
RBC #/AREA URNS HPF: ABNORMAL /HPF (ref 0–5)
SODIUM SERPL-SCNC: 143 MMOL/L (ref 136–145)
SP GR UR REFRACTOMETRY: 1.02 (ref 1–1.03)
TROPONIN-HIGH SENSITIVITY: 4 NG/L (ref 0–54)
UROBILINOGEN UR QL STRIP.AUTO: 0.2 EU/DL (ref 0.2–1)
VENTRICULAR RATE, ECG03: 66 BPM
WBC # BLD AUTO: 6.2 K/UL (ref 4.6–13.2)
WBC URNS QL MICRO: ABNORMAL /HPF (ref 0–5)

## 2022-03-17 PROCEDURE — 74011636637 HC RX REV CODE- 636/637: Performed by: EMERGENCY MEDICINE

## 2022-03-17 PROCEDURE — 84484 ASSAY OF TROPONIN QUANT: CPT

## 2022-03-17 PROCEDURE — 85025 COMPLETE CBC W/AUTO DIFF WBC: CPT

## 2022-03-17 PROCEDURE — 99284 EMERGENCY DEPT VISIT MOD MDM: CPT

## 2022-03-17 PROCEDURE — 74011250637 HC RX REV CODE- 250/637: Performed by: EMERGENCY MEDICINE

## 2022-03-17 PROCEDURE — 80053 COMPREHEN METABOLIC PANEL: CPT

## 2022-03-17 PROCEDURE — 83880 ASSAY OF NATRIURETIC PEPTIDE: CPT

## 2022-03-17 PROCEDURE — 81001 URINALYSIS AUTO W/SCOPE: CPT

## 2022-03-17 PROCEDURE — 93005 ELECTROCARDIOGRAM TRACING: CPT

## 2022-03-17 RX ORDER — PREDNISONE 20 MG/1
60 TABLET ORAL
Status: COMPLETED | OUTPATIENT
Start: 2022-03-17 | End: 2022-03-17

## 2022-03-17 RX ORDER — PREDNISONE 20 MG/1
60 TABLET ORAL DAILY
Qty: 15 TABLET | Refills: 0 | Status: SHIPPED | OUTPATIENT
Start: 2022-03-17 | End: 2022-03-22

## 2022-03-17 RX ORDER — TRAMADOL HYDROCHLORIDE 50 MG/1
50 TABLET ORAL
Qty: 12 TABLET | Refills: 0 | Status: SHIPPED | OUTPATIENT
Start: 2022-03-17 | End: 2022-03-20

## 2022-03-17 RX ORDER — HYDROCODONE BITARTRATE AND ACETAMINOPHEN 5; 325 MG/1; MG/1
1 TABLET ORAL
Status: COMPLETED | OUTPATIENT
Start: 2022-03-17 | End: 2022-03-17

## 2022-03-17 RX ADMIN — PREDNISONE 60 MG: 20 TABLET ORAL at 21:52

## 2022-03-17 RX ADMIN — HYDROCODONE BITARTRATE AND ACETAMINOPHEN 1 TABLET: 5; 325 TABLET ORAL at 21:52

## 2022-03-17 NOTE — ED TRIAGE NOTES
Pt presents for LLE pain/tingling hip-to-toe. Was dx 1 wk ago with sciatica and given mobic and tizanidine.  Denies urinary incontinence, saddle anesthesia, injections,  or injury/trauma

## 2022-03-17 NOTE — ED PROVIDER NOTES
63-year-old female presents emergency department with complaint of left-sided buttock and posterior leg pain as well as lumbar back pain. Patient has recent diagnosis within the last week of sciatica by primary care physician has been treated with meloxicam and tenacity and however she states the pain has been relentless and she is not getting any relief. On arrival patient was quite hypertensive to 205/88 prompting an upper damage rule out in laboratory investigation. EKG done on arrival showed no ST or T wave elevations consistent with a STEMI normal sinus rhythm noted. Patient is in no acute distress with no fever or hypoxia           Past Medical History:   Diagnosis Date    Arthritis     GERD (gastroesophageal reflux disease)     Hepatitis C     Hypertension     SVT (supraventricular tachycardia) (HCC)     WPW (Nahomy-Parkinson-White syndrome)        Past Surgical History:   Procedure Laterality Date    HX KNEE ARTHROSCOPY Right     HX OTHER SURGICAL  1997    Hx knee surgery R knee    HX SVT ABLATION      HX TOTAL COLECTOMY           No family history on file. Social History     Socioeconomic History    Marital status:      Spouse name: Not on file    Number of children: Not on file    Years of education: Not on file    Highest education level: Not on file   Occupational History    Not on file   Tobacco Use    Smoking status: Former Smoker     Packs/day: 0.25     Years: 17.00     Pack years: 4.25     Types: Cigarettes    Smokeless tobacco: Never Used   Substance and Sexual Activity    Alcohol use:  Yes     Alcohol/week: 2.0 standard drinks     Types: 2 Glasses of wine per week     Comment: socially    Drug use: Not Currently     Types: Marijuana     Comment: 1-2 monthly     Sexual activity: Not on file   Other Topics Concern    Not on file   Social History Narrative    Not on file     Social Determinants of Health     Financial Resource Strain:     Difficulty of Paying Living Expenses: Not on file   Food Insecurity:     Worried About Running Out of Food in the Last Year: Not on file    Sam of Food in the Last Year: Not on file   Transportation Needs:     Lack of Transportation (Medical): Not on file    Lack of Transportation (Non-Medical): Not on file   Physical Activity:     Days of Exercise per Week: Not on file    Minutes of Exercise per Session: Not on file   Stress:     Feeling of Stress : Not on file   Social Connections:     Frequency of Communication with Friends and Family: Not on file    Frequency of Social Gatherings with Friends and Family: Not on file    Attends Islam Services: Not on file    Active Member of [address] [place] [address] [employer] or Organizations: Not on file    Attends Club or Organization Meetings: Not on file    Marital Status: Not on file   Intimate Partner Violence:     Fear of Current or Ex-Partner: Not on file    Emotionally Abused: Not on file    Physically Abused: Not on file    Sexually Abused: Not on file   Housing Stability:     Unable to Pay for Housing in the Last Year: Not on file    Number of [place] in the Last Year: Not on file    Unstable Housing in the Last Year: Not on file         ALLERGIES: Patient has no known allergies. Review of Systems   Constitutional: Negative for activity change, appetite change, chills, diaphoresis, fatigue, fever and unexpected weight change. HENT: Negative. Respiratory: Negative. Cardiovascular: Negative. Gastrointestinal: Negative. Genitourinary: Negative. Musculoskeletal: Positive for arthralgias, back pain, gait problem and myalgias. Negative for joint swelling, neck pain and neck stiffness. Skin: Negative. Allergic/Immunologic: Negative. Hematological: Negative. Psychiatric/Behavioral: Negative.         Vitals:    03/17/22 1824   BP: (!) 205/88   Pulse: 72   Resp: 15   Temp: 97.7 °F (36.5 °C)   SpO2: 98%   Weight: 96.6 kg (213 lb)   Height: 5' 5\" (1.651 m) Physical Exam  Vitals and nursing note reviewed. Constitutional:       General: She is not in acute distress. Appearance: She is not ill-appearing, toxic-appearing or diaphoretic. HENT:      Head: Normocephalic and atraumatic. Nose: Nose normal. No congestion or rhinorrhea. Mouth/Throat:      Mouth: Mucous membranes are moist.      Pharynx: Oropharynx is clear. No oropharyngeal exudate or posterior oropharyngeal erythema. Eyes:      General: No scleral icterus. Right eye: No discharge. Left eye: No discharge. Extraocular Movements: Extraocular movements intact. Conjunctiva/sclera: Conjunctivae normal.      Pupils: Pupils are equal, round, and reactive to light. Cardiovascular:      Rate and Rhythm: Normal rate and regular rhythm. Pulses: Normal pulses. Heart sounds: Normal heart sounds. No murmur heard. No friction rub. No gallop. Pulmonary:      Effort: Pulmonary effort is normal. No respiratory distress. Breath sounds: No stridor. No wheezing or rhonchi. Abdominal:      General: Abdomen is flat. Bowel sounds are normal. There is no distension. Palpations: Abdomen is soft. There is no mass. Tenderness: There is no abdominal tenderness. Hernia: No hernia is present. Skin:     General: Skin is warm. Capillary Refill: Capillary refill takes less than 2 seconds. Coloration: Skin is not jaundiced or pale. Findings: No bruising, erythema, lesion or rash. Neurological:      General: No focal deficit present. Mental Status: She is alert and oriented to person, place, and time. Mental status is at baseline. Cranial Nerves: No cranial nerve deficit. Sensory: No sensory deficit. Motor: No weakness. Coordination: Coordination normal.   Psychiatric:         Mood and Affect: Mood normal.         Behavior: Behavior normal.         Thought Content:  Thought content normal.          MDM  Number of Diagnoses or Management Options  Sciatica of left side  Diagnosis management comments: 59-year-old female history of hypertension, hepatitis C, GERD, arthritis presents emergency department with right sided back pain that radiates into the buttocks and down the right leg. Has had no injury. She was seen by her primary care and started on meloxicam and tenacity however patient states that the pain is gotten worse and she is unable to sleep because of it. On initial arrival patient was quite hypertensive to 044 systolic she had no complaint of chest pain or shortness of breath on arrival no dizziness. Due to extreme hypertension and neuro damage work-up was also done however this may just have represented pain. EKG done on arrival showed no ST or T wave elevations consistent with an acute STEMI, normal sinus rhythm. On exam lungs were clear to auscultation bilaterally heart sounds are unremarkable abdomen was soft nontender nondistended. Patient has a positive right-sided straight leg test.  Her pain is radiating from the right SI joint into the buttocks into the back of the right leg. There is no evidence of trauma p.m. eyes are intact neurovascularly she is intact in bilateral lower extremities. Labs are done and are largely unremarkable not indicating any endorgan damage hypertension resolved without intervention. To treat patient's pain which is an acute exacerbation of what may end up being a chronic condition she was instructed to stop her meloxicam for the next 5 days and she placed on a short course of prednisone. She was given tramadol as an outpatient. As the pharmacies are closed in the emergency department she was given a dose of prednisone and Norco prior to discharge to get her through the night.   Discharged home with outpatient follow-up         Procedures

## 2022-03-18 NOTE — ED NOTES
I have reviewed discharge and follow-up instructions with the patient. The patient verbalized understanding. Pt escorted to ED exit via wheelchair by EMT to friend waiting in vehicleFADUMO

## 2022-11-09 ENCOUNTER — APPOINTMENT (OUTPATIENT)
Dept: PHYSICAL THERAPY | Age: 57
End: 2022-11-09

## 2023-09-26 NOTE — DISCHARGE INSTRUCTIONS
Diverticulitis: Care Instructions  Your Care Instructions    Diverticulitis occurs when pouches form in the wall of the colon and become inflamed or infected. It can be very painful. Doctors aren't sure what causes diverticulitis. There is no proof that foods such as nuts, seeds, or berries cause it or make it worse. A low-fiber diet may cause the colon to work harder to push stool forward. Pouches may form because of this extra work. It may be hard to think about healthy eating while you're in pain. But as you recover, you might think about how you can use healthy eating for overall better health. Healthy eating may help you avoid future attacks. Follow-up care is a key part of your treatment and safety. Be sure to make and go to all appointments, and call your doctor if you are having problems. It's also a good idea to know your test results and keep a list of the medicines you take. How can you care for yourself at home? · Drink plenty of fluids, enough so that your urine is light yellow or clear like water. If you have kidney, heart, or liver disease and have to limit fluids, talk with your doctor before you increase the amount of fluids you drink. · Stick to liquids or a bland diet (plain rice, bananas, dry toast or crackers, applesauce) until you are feeling better. Then you can return to regular foods and gradually increase the amount of fiber in your diet. · Use a heating pad set on low on your belly to relieve mild cramps and pain. · Get extra rest until you are feeling better. · Be safe with medicines. Read and follow all instructions on the label. ¨ If the doctor gave you a prescription medicine for pain, take it as prescribed. ¨ If you are not taking a prescription pain medicine, ask your doctor if you can take an over-the-counter medicine. · If your doctor prescribed antibiotics, take them as directed. Do not stop taking them just because you feel better.  You need to take the full course of antibiotics. To prevent future attacks of diverticulitis  · Avoid constipation:  ¨ Include fruits, vegetables, beans, and whole grains in your diet each day. These foods are high in fiber. ¨ Drink plenty of fluids, enough so that your urine is light yellow or clear like water. If you have kidney, heart, or liver disease and have to limit fluids, talk with your doctor before you increase the amount of fluids you drink. ¨ Get some exercise every day. Build up slowly to 30 to 60 minutes a day on 5 or more days of the week. ¨ Take a fiber supplement, such as Citrucel or Metamucil, every day if needed. Read and follow all instructions on the label. ¨ Schedule time each day for a bowel movement. Having a daily routine may help. Take your time and do not strain when having a bowel movement. When should you call for help? Call your doctor now or seek immediate medical care if:  ? · You have a fever. ? · You are vomiting. ? · You have new or worse belly pain. ? · You cannot pass stools or gas. ? Watch closely for changes in your health, and be sure to contact your doctor if you have any problems. Where can you learn more? Go to http://selena-lamonte.info/. Enter H901 in the search box to learn more about \"Diverticulitis: Care Instructions. \"  Current as of: May 12, 2017  Content Version: 11.4  © 5664-3115 [employer]. Care instructions adapted under license by [employer] (which disclaims liability or warranty for this information). If you have questions about a medical condition or this instruction, always ask your healthcare professional. [place] [address] any warranty or liability for your use of this information. Dr. Argueta,

## (undated) DEVICE — SUTURE VCRL + SZ 3-0 L18IN ABSRB UD SH 1/2 CIR TAPERCUT NDL VCP864D

## (undated) DEVICE — KENDALL SCD EXPRESS SLEEVES, KNEE LENGTH, MEDIUM: Brand: KENDALL SCD

## (undated) DEVICE — SUTURE ABSRB SH-1 TAPR PNT 1/2 CIR 22MM MFIL VLT 27IN SZ Z311H

## (undated) DEVICE — 3-0 COATED VICRYL PLUS UNDYED 1X27" SH --

## (undated) DEVICE — DRAPE THER FLUID WARMING 66X44 IN FLAT SLUSH DBL DISC ORS

## (undated) DEVICE — INSULATED BLADE ELECTRODE: Brand: EDGE

## (undated) DEVICE — GAUZE SPONGES,12 PLY: Brand: CURITY

## (undated) DEVICE — DEVON™ KNEE AND BODY STRAP 60" X 3" (1.5 M X 7.6 CM): Brand: DEVON

## (undated) DEVICE — SUT PROL 2-0 30IN SH BLU --

## (undated) DEVICE — SYRINGE MED 20ML STD CLR PLAS LUERLOCK TIP N CTRL DISP

## (undated) DEVICE — AMD ANTIMICROBIAL DRAIN SPONGES, 6 PLY, 0.2% POLYHEXAMETHYLENE BIGUANIDE HCI (PHMB): Brand: EXCILON

## (undated) DEVICE — Z INACTIVE USE 2527070 DRAPE SURG W40XL44IN UNDERBUTTOCK SMS POLYPR W/ PCH BK DISP

## (undated) DEVICE — GOWN,SIRUS,NONRNF,SETINSLV,XL,20/CS: Brand: MEDLINE

## (undated) DEVICE — MAYO STAND COVER: Brand: CONVERTORS

## (undated) DEVICE — STERILE POLYISOPRENE POWDER-FREE SURGICAL GLOVES: Brand: PROTEXIS

## (undated) DEVICE — Device

## (undated) DEVICE — SUTURE VCRL SZ 3-0 L54IN ABSRB UD LIGAPAK REEL POLYGLACTIN J285G

## (undated) DEVICE — MAJOR: Brand: MEDLINE INDUSTRIES, INC.

## (undated) DEVICE — WOUND RETRACTOR AND PROTECTOR: Brand: ALEXIS WOUND PROTECTOR-RETRACTOR

## (undated) DEVICE — STAPLER INT L28CM DIA29MM CLS STPL H10-2.5MM OPN LEG L5.5MM

## (undated) DEVICE — TRAY CATH 16FR DRN BG LF -- CONVERT TO ITEM 363158

## (undated) DEVICE — 40580 - THE PINK PAD - ADVANCED TRENDELENBURG POSITIONING KIT: Brand: 40580 - THE PINK PAD - ADVANCED TRENDELENBURG POSITIONING KIT

## (undated) DEVICE — SPONGE LAP 18X18IN STRL -- 5/PK

## (undated) DEVICE — STERILE POLYISOPRENE POWDER-FREE SURGICAL GLOVES WITH EMOLLIENT COATING: Brand: PROTEXIS

## (undated) DEVICE — 4-PORT MANIFOLD: Brand: NEPTUNE 2

## (undated) DEVICE — SEALER TISS L20CM DIA13MM ADV BPLR L CRV JAW OPN APPRCH

## (undated) DEVICE — SUTURE PDS II SZ 4-0 L27IN ABSRB VLT SH-1 L22MM 1/2 CIR Z310H

## (undated) DEVICE — AGENT HEMSTAT W6XL9IN OXIDIZED REGENERATED CELOS ABSRB FOR

## (undated) DEVICE — SUTURE PDS II SZ 1 L36IN ABSRB VLT L48MM CTX 1/2 CIR Z371T

## (undated) DEVICE — MEDI-VAC YANK SUCT HNDL W/TPRD BULBOUS TIP: Brand: CARDINAL HEALTH

## (undated) DEVICE — NEEDLE HYPO 21GA L1.5IN GRN POLYPR HUB S STL THN WALL IV

## (undated) DEVICE — SOL IRRIGATION INJ NACL 0.9% 500ML BTL

## (undated) DEVICE — BLADE ELECTRODE: Brand: EDGE

## (undated) DEVICE — SUT VCRL + 2-0 27IN SH UD --

## (undated) DEVICE — TRAY PREP DRY W/ PREM GLV 2 APPL 6 SPNG 2 UNDPD 1 OVERWRAP

## (undated) DEVICE — RELOAD STAPLER REGULAR TISSUE GREEN ECHELON CONTOUR GST --

## (undated) DEVICE — SUTURE PDS II SZ 3-0 L27IN ABSRB CLR SH L26MM 1/2 CIR TAPR Z416H

## (undated) DEVICE — DRAPE TWL SURG 16X26IN BLU ORB04] ALLCARE INC]

## (undated) DEVICE — Z DUPLICATE USE 2716240 STAPLER INT CUT LN L40MM STPL L51MM GRN CRV HD B FRM

## (undated) DEVICE — LEGGINGS, PAIR, 31X48, STERILE: Brand: MEDLINE

## (undated) DEVICE — DRAIN SURG L0.75IN TRCR